# Patient Record
Sex: FEMALE | Race: WHITE | NOT HISPANIC OR LATINO | Employment: OTHER | URBAN - METROPOLITAN AREA
[De-identification: names, ages, dates, MRNs, and addresses within clinical notes are randomized per-mention and may not be internally consistent; named-entity substitution may affect disease eponyms.]

---

## 2017-05-27 ENCOUNTER — COMMUNICATION - HEALTHEAST (OUTPATIENT)
Dept: FAMILY MEDICINE | Facility: CLINIC | Age: 62
End: 2017-05-27

## 2017-05-27 DIAGNOSIS — E78.5 HYPERLIPIDEMIA: ICD-10-CM

## 2017-09-14 ENCOUNTER — COMMUNICATION - HEALTHEAST (OUTPATIENT)
Dept: FAMILY MEDICINE | Facility: CLINIC | Age: 62
End: 2017-09-14

## 2017-09-14 DIAGNOSIS — E78.5 HYPERLIPIDEMIA: ICD-10-CM

## 2017-11-14 ENCOUNTER — OFFICE VISIT - HEALTHEAST (OUTPATIENT)
Dept: FAMILY MEDICINE | Facility: CLINIC | Age: 62
End: 2017-11-14

## 2017-11-14 ENCOUNTER — AMBULATORY - HEALTHEAST (OUTPATIENT)
Dept: SCHEDULING | Facility: CLINIC | Age: 62
End: 2017-11-14

## 2017-11-14 DIAGNOSIS — Z12.31 VISIT FOR SCREENING MAMMOGRAM: ICD-10-CM

## 2017-11-14 DIAGNOSIS — E11.9 TYPE 2 DIABETES MELLITUS WITHOUT COMPLICATION, WITHOUT LONG-TERM CURRENT USE OF INSULIN (H): ICD-10-CM

## 2017-11-14 DIAGNOSIS — Z00.00 HEALTH MAINTENANCE EXAMINATION: ICD-10-CM

## 2017-11-14 DIAGNOSIS — Z78.0 POST-MENOPAUSAL: ICD-10-CM

## 2017-11-14 DIAGNOSIS — R73.03 PREDIABETES: ICD-10-CM

## 2017-11-14 DIAGNOSIS — E78.5 HYPERLIPIDEMIA: ICD-10-CM

## 2017-11-14 LAB
CHOLEST SERPL-MCNC: 250 MG/DL
FASTING STATUS PATIENT QL REPORTED: YES
HBA1C MFR BLD: 6.6 % (ref 3.5–6)
HDLC SERPL-MCNC: 54 MG/DL
LDLC SERPL CALC-MCNC: 163 MG/DL
TRIGL SERPL-MCNC: 165 MG/DL

## 2017-11-14 ASSESSMENT — MIFFLIN-ST. JEOR: SCORE: 1434.82

## 2017-11-15 LAB — HCV AB SERPL QL IA: NEGATIVE

## 2017-11-29 ENCOUNTER — OFFICE VISIT - HEALTHEAST (OUTPATIENT)
Dept: EDUCATION SERVICES | Facility: CLINIC | Age: 62
End: 2017-11-29

## 2017-11-29 DIAGNOSIS — E11.9 DIABETES MELLITUS (H): ICD-10-CM

## 2017-12-05 ENCOUNTER — HOSPITAL ENCOUNTER (OUTPATIENT)
Dept: MAMMOGRAPHY | Facility: HOSPITAL | Age: 62
Discharge: HOME | End: 2017-12-05
Attending: FAMILY MEDICINE

## 2017-12-05 DIAGNOSIS — Z12.31 VISIT FOR SCREENING MAMMOGRAM: ICD-10-CM

## 2017-12-13 ENCOUNTER — COMMUNICATION - HEALTHEAST (OUTPATIENT)
Dept: FAMILY MEDICINE | Facility: CLINIC | Age: 62
End: 2017-12-13

## 2017-12-13 DIAGNOSIS — E78.5 HYPERLIPIDEMIA: ICD-10-CM

## 2018-01-11 ENCOUNTER — OFFICE VISIT - HEALTHEAST (OUTPATIENT)
Dept: FAMILY MEDICINE | Facility: CLINIC | Age: 63
End: 2018-01-11

## 2018-01-11 DIAGNOSIS — M54.50 LEFT-SIDED LOW BACK PAIN WITHOUT SCIATICA: ICD-10-CM

## 2018-01-31 ENCOUNTER — COMMUNICATION - HEALTHEAST (OUTPATIENT)
Dept: FAMILY MEDICINE | Facility: CLINIC | Age: 63
End: 2018-01-31

## 2018-01-31 DIAGNOSIS — M54.50 LOW BACK PAIN: ICD-10-CM

## 2018-01-31 DIAGNOSIS — M54.50 LEFT-SIDED LOW BACK PAIN WITHOUT SCIATICA: ICD-10-CM

## 2018-02-06 ENCOUNTER — OFFICE VISIT - HEALTHEAST (OUTPATIENT)
Dept: PHYSICAL THERAPY | Facility: REHABILITATION | Age: 63
End: 2018-02-06

## 2018-02-06 DIAGNOSIS — M62.81 MUSCLE WEAKNESS (GENERALIZED): ICD-10-CM

## 2018-02-06 DIAGNOSIS — M54.16 LEFT LUMBAR RADICULITIS: ICD-10-CM

## 2018-02-09 ENCOUNTER — OFFICE VISIT - HEALTHEAST (OUTPATIENT)
Dept: PHYSICAL THERAPY | Facility: REHABILITATION | Age: 63
End: 2018-02-09

## 2018-02-09 DIAGNOSIS — M62.81 MUSCLE WEAKNESS (GENERALIZED): ICD-10-CM

## 2018-02-09 DIAGNOSIS — M54.16 LEFT LUMBAR RADICULITIS: ICD-10-CM

## 2018-02-13 ENCOUNTER — OFFICE VISIT - HEALTHEAST (OUTPATIENT)
Dept: PHYSICAL THERAPY | Facility: REHABILITATION | Age: 63
End: 2018-02-13

## 2018-02-13 DIAGNOSIS — M54.16 LEFT LUMBAR RADICULITIS: ICD-10-CM

## 2018-02-13 DIAGNOSIS — M62.81 MUSCLE WEAKNESS (GENERALIZED): ICD-10-CM

## 2018-02-16 ENCOUNTER — OFFICE VISIT - HEALTHEAST (OUTPATIENT)
Dept: PHYSICAL THERAPY | Facility: REHABILITATION | Age: 63
End: 2018-02-16

## 2018-02-16 DIAGNOSIS — M62.81 MUSCLE WEAKNESS (GENERALIZED): ICD-10-CM

## 2018-02-16 DIAGNOSIS — M54.16 LEFT LUMBAR RADICULITIS: ICD-10-CM

## 2018-02-20 ENCOUNTER — OFFICE VISIT - HEALTHEAST (OUTPATIENT)
Dept: FAMILY MEDICINE | Facility: CLINIC | Age: 63
End: 2018-02-20

## 2018-02-20 DIAGNOSIS — E11.9 CONTROLLED TYPE 2 DIABETES MELLITUS WITHOUT COMPLICATION, WITHOUT LONG-TERM CURRENT USE OF INSULIN (H): ICD-10-CM

## 2018-02-20 DIAGNOSIS — R73.09 OTHER ABNORMAL GLUCOSE: ICD-10-CM

## 2018-02-20 DIAGNOSIS — M54.42 CHRONIC LEFT-SIDED LOW BACK PAIN WITH LEFT-SIDED SCIATICA: ICD-10-CM

## 2018-02-20 DIAGNOSIS — E78.5 HYPERLIPIDEMIA: ICD-10-CM

## 2018-02-20 DIAGNOSIS — G89.29 CHRONIC LEFT-SIDED LOW BACK PAIN WITH LEFT-SIDED SCIATICA: ICD-10-CM

## 2018-02-20 LAB
AST SERPL W P-5'-P-CCNC: 32 U/L (ref 0–40)
CHOLEST SERPL-MCNC: 218 MG/DL
CREAT UR-MCNC: 226 MG/DL
FASTING STATUS PATIENT QL REPORTED: YES
HBA1C MFR BLD: 6.8 % (ref 3.5–6)
HDLC SERPL-MCNC: 53 MG/DL
LDLC SERPL CALC-MCNC: 123 MG/DL
MICROALBUMIN UR-MCNC: 8.98 MG/DL (ref 0–1.99)
MICROALBUMIN/CREAT UR: 39.7 MG/G
TRIGL SERPL-MCNC: 210 MG/DL

## 2018-02-20 RX ORDER — ASPIRIN 81 MG/1
81 TABLET, CHEWABLE ORAL DAILY
Status: SHIPPED | COMMUNITY
Start: 2018-02-20 | End: 2023-05-08

## 2018-02-21 ENCOUNTER — OFFICE VISIT - HEALTHEAST (OUTPATIENT)
Dept: PHYSICAL THERAPY | Facility: REHABILITATION | Age: 63
End: 2018-02-21

## 2018-02-21 DIAGNOSIS — M54.16 LEFT LUMBAR RADICULITIS: ICD-10-CM

## 2018-02-21 DIAGNOSIS — M62.81 MUSCLE WEAKNESS (GENERALIZED): ICD-10-CM

## 2018-02-26 ENCOUNTER — COMMUNICATION - HEALTHEAST (OUTPATIENT)
Dept: FAMILY MEDICINE | Facility: CLINIC | Age: 63
End: 2018-02-26

## 2018-02-26 DIAGNOSIS — E78.5 HYPERLIPIDEMIA: ICD-10-CM

## 2018-02-28 ENCOUNTER — OFFICE VISIT - HEALTHEAST (OUTPATIENT)
Dept: PHYSICAL THERAPY | Facility: REHABILITATION | Age: 63
End: 2018-02-28

## 2018-02-28 DIAGNOSIS — M62.81 MUSCLE WEAKNESS (GENERALIZED): ICD-10-CM

## 2018-02-28 DIAGNOSIS — M54.16 LEFT LUMBAR RADICULITIS: ICD-10-CM

## 2018-03-06 ENCOUNTER — AMBULATORY - HEALTHEAST (OUTPATIENT)
Dept: FAMILY MEDICINE | Facility: CLINIC | Age: 63
End: 2018-03-06

## 2018-03-06 ENCOUNTER — AMBULATORY - HEALTHEAST (OUTPATIENT)
Dept: LAB | Facility: CLINIC | Age: 63
End: 2018-03-06

## 2018-03-06 ENCOUNTER — AMBULATORY - HEALTHEAST (OUTPATIENT)
Dept: NURSING | Facility: CLINIC | Age: 63
End: 2018-03-06

## 2018-03-06 DIAGNOSIS — I10 HTN (HYPERTENSION): ICD-10-CM

## 2018-03-06 DIAGNOSIS — I10 ESSENTIAL HYPERTENSION: ICD-10-CM

## 2018-03-06 LAB
ANION GAP SERPL CALCULATED.3IONS-SCNC: 10 MMOL/L (ref 5–18)
BUN SERPL-MCNC: 14 MG/DL (ref 8–22)
CALCIUM SERPL-MCNC: 9.7 MG/DL (ref 8.5–10.5)
CHLORIDE BLD-SCNC: 106 MMOL/L (ref 98–107)
CO2 SERPL-SCNC: 24 MMOL/L (ref 22–31)
CREAT SERPL-MCNC: 0.92 MG/DL (ref 0.6–1.1)
GFR SERPL CREATININE-BSD FRML MDRD: >60 ML/MIN/1.73M2
GLUCOSE BLD-MCNC: 128 MG/DL (ref 70–125)
POTASSIUM BLD-SCNC: 3.9 MMOL/L (ref 3.5–5)
SODIUM SERPL-SCNC: 140 MMOL/L (ref 136–145)

## 2018-03-20 ENCOUNTER — OFFICE VISIT - HEALTHEAST (OUTPATIENT)
Dept: PHYSICAL THERAPY | Facility: REHABILITATION | Age: 63
End: 2018-03-20

## 2018-03-20 DIAGNOSIS — M62.81 MUSCLE WEAKNESS (GENERALIZED): ICD-10-CM

## 2018-03-20 DIAGNOSIS — M54.16 LEFT LUMBAR RADICULITIS: ICD-10-CM

## 2018-03-24 ENCOUNTER — COMMUNICATION - HEALTHEAST (OUTPATIENT)
Dept: FAMILY MEDICINE | Facility: CLINIC | Age: 63
End: 2018-03-24

## 2018-03-24 DIAGNOSIS — I10 HTN (HYPERTENSION): ICD-10-CM

## 2018-05-23 ENCOUNTER — COMMUNICATION - HEALTHEAST (OUTPATIENT)
Dept: FAMILY MEDICINE | Facility: CLINIC | Age: 63
End: 2018-05-23

## 2018-05-23 DIAGNOSIS — E78.5 HYPERLIPIDEMIA: ICD-10-CM

## 2019-02-13 ENCOUNTER — COMMUNICATION - HEALTHEAST (OUTPATIENT)
Dept: FAMILY MEDICINE | Facility: CLINIC | Age: 64
End: 2019-02-13

## 2019-02-13 DIAGNOSIS — E78.5 HYPERLIPIDEMIA: ICD-10-CM

## 2019-02-27 ENCOUNTER — COMMUNICATION - HEALTHEAST (OUTPATIENT)
Dept: FAMILY MEDICINE | Facility: CLINIC | Age: 64
End: 2019-02-27

## 2019-03-01 ENCOUNTER — OFFICE VISIT - HEALTHEAST (OUTPATIENT)
Dept: FAMILY MEDICINE | Facility: CLINIC | Age: 64
End: 2019-03-01

## 2019-03-01 DIAGNOSIS — E78.5 HYPERLIPIDEMIA, UNSPECIFIED HYPERLIPIDEMIA TYPE: ICD-10-CM

## 2019-03-01 DIAGNOSIS — L90.0 LICHEN SCLEROSUS: ICD-10-CM

## 2019-03-01 DIAGNOSIS — E11.9 CONTROLLED TYPE 2 DIABETES MELLITUS WITHOUT COMPLICATION, WITHOUT LONG-TERM CURRENT USE OF INSULIN (H): ICD-10-CM

## 2019-03-01 DIAGNOSIS — E66.9 OBESITY (BMI 30-39.9): ICD-10-CM

## 2019-03-01 LAB
ALBUMIN SERPL-MCNC: 3.6 G/DL (ref 3.5–5)
ALP SERPL-CCNC: 78 U/L (ref 45–120)
ALT SERPL W P-5'-P-CCNC: 36 U/L (ref 0–45)
ANION GAP SERPL CALCULATED.3IONS-SCNC: 9 MMOL/L (ref 5–18)
AST SERPL W P-5'-P-CCNC: 22 U/L (ref 0–40)
BILIRUB SERPL-MCNC: 0.7 MG/DL (ref 0–1)
BUN SERPL-MCNC: 14 MG/DL (ref 8–22)
CALCIUM SERPL-MCNC: 9.3 MG/DL (ref 8.5–10.5)
CHLORIDE BLD-SCNC: 104 MMOL/L (ref 98–107)
CHOLEST SERPL-MCNC: 222 MG/DL
CO2 SERPL-SCNC: 24 MMOL/L (ref 22–31)
CREAT SERPL-MCNC: 0.93 MG/DL (ref 0.6–1.1)
CREAT UR-MCNC: 178.4 MG/DL
FASTING STATUS PATIENT QL REPORTED: YES
GFR SERPL CREATININE-BSD FRML MDRD: >60 ML/MIN/1.73M2
GLUCOSE BLD-MCNC: 171 MG/DL (ref 70–125)
HBA1C MFR BLD: 8.3 % (ref 3.5–6)
HDLC SERPL-MCNC: 58 MG/DL
LDLC SERPL CALC-MCNC: 130 MG/DL
MICROALBUMIN UR-MCNC: 1.94 MG/DL (ref 0–1.99)
MICROALBUMIN/CREAT UR: 10.9 MG/G
POTASSIUM BLD-SCNC: 4.2 MMOL/L (ref 3.5–5)
PROT SERPL-MCNC: 7.1 G/DL (ref 6–8)
SODIUM SERPL-SCNC: 137 MMOL/L (ref 136–145)
TRIGL SERPL-MCNC: 170 MG/DL

## 2019-03-01 ASSESSMENT — MIFFLIN-ST. JEOR: SCORE: 1446.17

## 2019-03-08 ENCOUNTER — COMMUNICATION - HEALTHEAST (OUTPATIENT)
Dept: FAMILY MEDICINE | Facility: CLINIC | Age: 64
End: 2019-03-08

## 2019-03-08 DIAGNOSIS — I10 HTN (HYPERTENSION): ICD-10-CM

## 2019-06-10 ENCOUNTER — OFFICE VISIT - HEALTHEAST (OUTPATIENT)
Dept: FAMILY MEDICINE | Facility: CLINIC | Age: 64
End: 2019-06-10

## 2019-06-10 DIAGNOSIS — Z23 NEED FOR SHINGLES VACCINE: ICD-10-CM

## 2019-06-10 DIAGNOSIS — Z11.59 MEASLES SCREENING: ICD-10-CM

## 2019-06-10 DIAGNOSIS — Z00.00 HEALTH MAINTENANCE EXAMINATION: ICD-10-CM

## 2019-06-10 DIAGNOSIS — E11.9 TYPE 2 DIABETES MELLITUS WITHOUT COMPLICATION, WITHOUT LONG-TERM CURRENT USE OF INSULIN (H): ICD-10-CM

## 2019-06-10 DIAGNOSIS — N90.4 LICHEN SCLEROSUS OF FEMALE GENITALIA: ICD-10-CM

## 2019-06-10 LAB
ALBUMIN SERPL-MCNC: 3.5 G/DL (ref 3.5–5)
ALP SERPL-CCNC: 81 U/L (ref 45–120)
ALT SERPL W P-5'-P-CCNC: 29 U/L (ref 0–45)
ANION GAP SERPL CALCULATED.3IONS-SCNC: 13 MMOL/L (ref 5–18)
AST SERPL W P-5'-P-CCNC: 18 U/L (ref 0–40)
BILIRUB SERPL-MCNC: 0.6 MG/DL (ref 0–1)
BUN SERPL-MCNC: 11 MG/DL (ref 8–22)
CALCIUM SERPL-MCNC: 9.5 MG/DL (ref 8.5–10.5)
CHLORIDE BLD-SCNC: 105 MMOL/L (ref 98–107)
CHOLEST SERPL-MCNC: 196 MG/DL
CO2 SERPL-SCNC: 23 MMOL/L (ref 22–31)
CREAT SERPL-MCNC: 0.82 MG/DL (ref 0.6–1.1)
FASTING STATUS PATIENT QL REPORTED: YES
GFR SERPL CREATININE-BSD FRML MDRD: >60 ML/MIN/1.73M2
GLUCOSE BLD-MCNC: 125 MG/DL (ref 70–125)
HBA1C MFR BLD: 6.8 % (ref 3.5–6)
HDLC SERPL-MCNC: 47 MG/DL
LDLC SERPL CALC-MCNC: 117 MG/DL
POTASSIUM BLD-SCNC: 4.3 MMOL/L (ref 3.5–5)
PROT SERPL-MCNC: 7.2 G/DL (ref 6–8)
SODIUM SERPL-SCNC: 141 MMOL/L (ref 136–145)
TRIGL SERPL-MCNC: 162 MG/DL

## 2019-06-10 ASSESSMENT — MIFFLIN-ST. JEOR: SCORE: 1394.04

## 2019-06-12 LAB — MEV IGG SER IA-ACNC: POSITIVE

## 2019-07-11 ENCOUNTER — COMMUNICATION - HEALTHEAST (OUTPATIENT)
Dept: FAMILY MEDICINE | Facility: CLINIC | Age: 64
End: 2019-07-11

## 2019-07-11 DIAGNOSIS — I10 HTN (HYPERTENSION): ICD-10-CM

## 2019-12-30 ENCOUNTER — COMMUNICATION - HEALTHEAST (OUTPATIENT)
Dept: LAB | Facility: CLINIC | Age: 64
End: 2019-12-30

## 2019-12-30 DIAGNOSIS — E11.9 CONTROLLED TYPE 2 DIABETES MELLITUS WITHOUT COMPLICATION, WITHOUT LONG-TERM CURRENT USE OF INSULIN (H): ICD-10-CM

## 2020-01-10 ENCOUNTER — AMBULATORY - HEALTHEAST (OUTPATIENT)
Dept: LAB | Facility: CLINIC | Age: 65
End: 2020-01-10

## 2020-01-10 DIAGNOSIS — E11.9 CONTROLLED TYPE 2 DIABETES MELLITUS WITHOUT COMPLICATION, WITHOUT LONG-TERM CURRENT USE OF INSULIN (H): ICD-10-CM

## 2020-01-10 LAB — HBA1C MFR BLD: 6 % (ref 3.5–6)

## 2020-02-04 ENCOUNTER — AMBULATORY - HEALTHEAST (OUTPATIENT)
Dept: OTHER | Facility: CLINIC | Age: 65
End: 2020-02-04

## 2020-02-18 ENCOUNTER — COMMUNICATION - HEALTHEAST (OUTPATIENT)
Dept: FAMILY MEDICINE | Facility: CLINIC | Age: 65
End: 2020-02-18

## 2020-02-18 DIAGNOSIS — E78.5 HYPERLIPIDEMIA, UNSPECIFIED HYPERLIPIDEMIA TYPE: ICD-10-CM

## 2020-03-09 ENCOUNTER — COMMUNICATION - HEALTHEAST (OUTPATIENT)
Dept: FAMILY MEDICINE | Facility: CLINIC | Age: 65
End: 2020-03-09

## 2020-03-09 DIAGNOSIS — E11.9 TYPE 2 DIABETES MELLITUS WITHOUT COMPLICATION, WITHOUT LONG-TERM CURRENT USE OF INSULIN (H): ICD-10-CM

## 2020-04-06 ENCOUNTER — COMMUNICATION - HEALTHEAST (OUTPATIENT)
Dept: FAMILY MEDICINE | Facility: CLINIC | Age: 65
End: 2020-04-06

## 2020-04-22 ENCOUNTER — COMMUNICATION - HEALTHEAST (OUTPATIENT)
Dept: FAMILY MEDICINE | Facility: CLINIC | Age: 65
End: 2020-04-22

## 2020-05-06 ENCOUNTER — COMMUNICATION - HEALTHEAST (OUTPATIENT)
Dept: FAMILY MEDICINE | Facility: CLINIC | Age: 65
End: 2020-05-06

## 2020-05-06 DIAGNOSIS — I10 HTN (HYPERTENSION): ICD-10-CM

## 2020-05-13 ENCOUNTER — COMMUNICATION - HEALTHEAST (OUTPATIENT)
Dept: FAMILY MEDICINE | Facility: CLINIC | Age: 65
End: 2020-05-13

## 2020-05-13 DIAGNOSIS — E78.5 HYPERLIPIDEMIA, UNSPECIFIED HYPERLIPIDEMIA TYPE: ICD-10-CM

## 2020-07-29 ENCOUNTER — COMMUNICATION - HEALTHEAST (OUTPATIENT)
Dept: FAMILY MEDICINE | Facility: CLINIC | Age: 65
End: 2020-07-29

## 2020-07-29 DIAGNOSIS — I10 HTN (HYPERTENSION): ICD-10-CM

## 2020-08-05 ENCOUNTER — COMMUNICATION - HEALTHEAST (OUTPATIENT)
Dept: FAMILY MEDICINE | Facility: CLINIC | Age: 65
End: 2020-08-05

## 2020-08-07 ENCOUNTER — COMMUNICATION - HEALTHEAST (OUTPATIENT)
Dept: FAMILY MEDICINE | Facility: CLINIC | Age: 65
End: 2020-08-07

## 2020-08-07 DIAGNOSIS — E78.5 HYPERLIPIDEMIA, UNSPECIFIED HYPERLIPIDEMIA TYPE: ICD-10-CM

## 2020-08-27 ENCOUNTER — OFFICE VISIT - HEALTHEAST (OUTPATIENT)
Dept: FAMILY MEDICINE | Facility: CLINIC | Age: 65
End: 2020-08-27

## 2020-08-27 DIAGNOSIS — E11.9 TYPE 2 DIABETES MELLITUS WITHOUT COMPLICATION, WITHOUT LONG-TERM CURRENT USE OF INSULIN (H): ICD-10-CM

## 2020-08-27 DIAGNOSIS — Z78.0 POST-MENOPAUSAL: ICD-10-CM

## 2020-08-27 DIAGNOSIS — Z12.31 ENCOUNTER FOR SCREENING MAMMOGRAM FOR BREAST CANCER: ICD-10-CM

## 2020-08-27 DIAGNOSIS — E66.9 OBESITY (BMI 30-39.9): ICD-10-CM

## 2020-08-27 DIAGNOSIS — E78.5 HYPERLIPIDEMIA, UNSPECIFIED HYPERLIPIDEMIA TYPE: ICD-10-CM

## 2020-08-27 LAB
ALBUMIN SERPL-MCNC: 3.9 G/DL (ref 3.5–5)
ALP SERPL-CCNC: 90 U/L (ref 45–120)
ALT SERPL W P-5'-P-CCNC: 33 U/L (ref 0–45)
ANION GAP SERPL CALCULATED.3IONS-SCNC: 13 MMOL/L (ref 5–18)
AST SERPL W P-5'-P-CCNC: 20 U/L (ref 0–40)
BILIRUB SERPL-MCNC: 0.8 MG/DL (ref 0–1)
BUN SERPL-MCNC: 16 MG/DL (ref 8–22)
CALCIUM SERPL-MCNC: 9.8 MG/DL (ref 8.5–10.5)
CHLORIDE BLD-SCNC: 103 MMOL/L (ref 98–107)
CHOLEST SERPL-MCNC: 181 MG/DL
CO2 SERPL-SCNC: 21 MMOL/L (ref 22–31)
CREAT SERPL-MCNC: 0.93 MG/DL (ref 0.6–1.1)
CREAT UR-MCNC: 166.3 MG/DL
FASTING STATUS PATIENT QL REPORTED: YES
GFR SERPL CREATININE-BSD FRML MDRD: >60 ML/MIN/1.73M2
GLUCOSE BLD-MCNC: 126 MG/DL (ref 70–125)
HBA1C MFR BLD: 7 %
HDLC SERPL-MCNC: 56 MG/DL
LDLC SERPL CALC-MCNC: 100 MG/DL
MICROALBUMIN UR-MCNC: 1.36 MG/DL (ref 0–1.99)
MICROALBUMIN/CREAT UR: 8.2 MG/G
POTASSIUM BLD-SCNC: 4.4 MMOL/L (ref 3.5–5)
PROT SERPL-MCNC: 7.4 G/DL (ref 6–8)
SODIUM SERPL-SCNC: 137 MMOL/L (ref 136–145)
TRIGL SERPL-MCNC: 124 MG/DL
TSH SERPL DL<=0.005 MIU/L-ACNC: 2.11 UIU/ML (ref 0.3–5)

## 2020-08-27 ASSESSMENT — ANXIETY QUESTIONNAIRES
1. FEELING NERVOUS, ANXIOUS, OR ON EDGE: SEVERAL DAYS
4. TROUBLE RELAXING: NOT AT ALL
IF YOU CHECKED OFF ANY PROBLEMS ON THIS QUESTIONNAIRE, HOW DIFFICULT HAVE THESE PROBLEMS MADE IT FOR YOU TO DO YOUR WORK, TAKE CARE OF THINGS AT HOME, OR GET ALONG WITH OTHER PEOPLE: NOT DIFFICULT AT ALL
3. WORRYING TOO MUCH ABOUT DIFFERENT THINGS: NOT AT ALL
6. BECOMING EASILY ANNOYED OR IRRITABLE: NOT AT ALL
7. FEELING AFRAID AS IF SOMETHING AWFUL MIGHT HAPPEN: SEVERAL DAYS
GAD7 TOTAL SCORE: 3
2. NOT BEING ABLE TO STOP OR CONTROL WORRYING: SEVERAL DAYS
5. BEING SO RESTLESS THAT IT IS HARD TO SIT STILL: NOT AT ALL

## 2020-08-27 ASSESSMENT — PATIENT HEALTH QUESTIONNAIRE - PHQ9: SUM OF ALL RESPONSES TO PHQ QUESTIONS 1-9: 1

## 2020-10-27 ENCOUNTER — COMMUNICATION - HEALTHEAST (OUTPATIENT)
Dept: FAMILY MEDICINE | Facility: CLINIC | Age: 65
End: 2020-10-27

## 2020-10-27 DIAGNOSIS — I10 HTN (HYPERTENSION): ICD-10-CM

## 2020-10-29 RX ORDER — LISINOPRIL 5 MG/1
5 TABLET ORAL DAILY
Qty: 90 TABLET | Refills: 2 | Status: SHIPPED | OUTPATIENT
Start: 2020-10-29 | End: 2021-08-03

## 2020-11-02 ENCOUNTER — COMMUNICATION - HEALTHEAST (OUTPATIENT)
Dept: FAMILY MEDICINE | Facility: CLINIC | Age: 65
End: 2020-11-02

## 2020-11-02 DIAGNOSIS — E78.5 HYPERLIPIDEMIA, UNSPECIFIED HYPERLIPIDEMIA TYPE: ICD-10-CM

## 2021-02-25 ENCOUNTER — COMMUNICATION - HEALTHEAST (OUTPATIENT)
Dept: FAMILY MEDICINE | Facility: CLINIC | Age: 66
End: 2021-02-25

## 2021-02-25 DIAGNOSIS — E11.9 TYPE 2 DIABETES MELLITUS WITHOUT COMPLICATION, WITHOUT LONG-TERM CURRENT USE OF INSULIN (H): ICD-10-CM

## 2021-03-22 ENCOUNTER — OFFICE VISIT - HEALTHEAST (OUTPATIENT)
Dept: FAMILY MEDICINE | Facility: CLINIC | Age: 66
End: 2021-03-22

## 2021-03-22 DIAGNOSIS — N90.4 LICHEN SCLEROSUS OF FEMALE GENITALIA: ICD-10-CM

## 2021-03-22 DIAGNOSIS — Z11.59 SCREENING FOR VIRAL DISEASE: ICD-10-CM

## 2021-03-22 DIAGNOSIS — E11.9 TYPE 2 DIABETES MELLITUS WITHOUT COMPLICATION, WITHOUT LONG-TERM CURRENT USE OF INSULIN (H): ICD-10-CM

## 2021-03-22 DIAGNOSIS — Z00.00 ROUTINE HISTORY AND PHYSICAL EXAMINATION OF ADULT: ICD-10-CM

## 2021-03-22 DIAGNOSIS — E78.5 HYPERLIPIDEMIA, UNSPECIFIED HYPERLIPIDEMIA TYPE: ICD-10-CM

## 2021-03-22 LAB
CHOLEST SERPL-MCNC: 213 MG/DL
FASTING STATUS PATIENT QL REPORTED: YES
HBA1C MFR BLD: 7.5 %
HDLC SERPL-MCNC: 61 MG/DL
HIV 1+2 AB+HIV1 P24 AG SERPL QL IA: NEGATIVE
LDLC SERPL CALC-MCNC: 116 MG/DL
TRIGL SERPL-MCNC: 180 MG/DL

## 2021-03-22 RX ORDER — CLOBETASOL PROPIONATE 0.5 MG/G
OINTMENT TOPICAL
Qty: 60 G | Refills: 1 | Status: SHIPPED | OUTPATIENT
Start: 2021-03-22 | End: 2022-11-03

## 2021-03-22 ASSESSMENT — MIFFLIN-ST. JEOR: SCORE: 1361.23

## 2021-03-24 ENCOUNTER — AMBULATORY - HEALTHEAST (OUTPATIENT)
Dept: NURSING | Facility: CLINIC | Age: 66
End: 2021-03-24

## 2021-03-26 ENCOUNTER — COMMUNICATION - HEALTHEAST (OUTPATIENT)
Dept: FAMILY MEDICINE | Facility: CLINIC | Age: 66
End: 2021-03-26

## 2021-03-26 DIAGNOSIS — E78.5 HYPERLIPIDEMIA, UNSPECIFIED HYPERLIPIDEMIA TYPE: ICD-10-CM

## 2021-03-28 RX ORDER — ATORVASTATIN CALCIUM 20 MG/1
40 TABLET, FILM COATED ORAL AT BEDTIME
Qty: 90 TABLET | Refills: 3 | Status: SHIPPED | OUTPATIENT
Start: 2021-03-28 | End: 2021-08-03

## 2021-04-05 ENCOUNTER — RECORDS - HEALTHEAST (OUTPATIENT)
Dept: ADMINISTRATIVE | Facility: OTHER | Age: 66
End: 2021-04-05

## 2021-04-14 ENCOUNTER — AMBULATORY - HEALTHEAST (OUTPATIENT)
Dept: NURSING | Facility: CLINIC | Age: 66
End: 2021-04-14

## 2021-05-19 ENCOUNTER — COMMUNICATION - HEALTHEAST (OUTPATIENT)
Dept: FAMILY MEDICINE | Facility: CLINIC | Age: 66
End: 2021-05-19

## 2021-05-19 DIAGNOSIS — E78.5 HYPERLIPIDEMIA, UNSPECIFIED HYPERLIPIDEMIA TYPE: ICD-10-CM

## 2021-05-21 ENCOUNTER — COMMUNICATION - HEALTHEAST (OUTPATIENT)
Dept: FAMILY MEDICINE | Facility: CLINIC | Age: 66
End: 2021-05-21

## 2021-05-21 DIAGNOSIS — E78.5 HYPERLIPIDEMIA, UNSPECIFIED HYPERLIPIDEMIA TYPE: ICD-10-CM

## 2021-05-24 ENCOUNTER — RECORDS - HEALTHEAST (OUTPATIENT)
Dept: ADMINISTRATIVE | Facility: CLINIC | Age: 66
End: 2021-05-24

## 2021-05-27 ASSESSMENT — PATIENT HEALTH QUESTIONNAIRE - PHQ9: SUM OF ALL RESPONSES TO PHQ QUESTIONS 1-9: 1

## 2021-05-28 ENCOUNTER — RECORDS - HEALTHEAST (OUTPATIENT)
Dept: ADMINISTRATIVE | Facility: CLINIC | Age: 66
End: 2021-05-28

## 2021-05-28 ASSESSMENT — ANXIETY QUESTIONNAIRES: GAD7 TOTAL SCORE: 3

## 2021-05-29 NOTE — PROGRESS NOTES
FEMALE ADULT PREVENTIVE EXAM    ASSESSMENT:   Hanh was seen today for annual exam.  1. Health maintenance examination  Lipid Cascade    Comprehensive Metabolic Panel   2. Need for shingles vaccine     3. Type 2 diabetes mellitus without complication, without long-term current use of insulin (H)  Glycosylated Hemoglobin A1c   4. Lichen sclerosus of female genitalia     5. Measles screening  Rubeola Antibody, IgG     Advised that she follow-up with GYN if she has increasing flareups of lichen sclerosus.    Recheck diabetes today and then in 3 months time.      PLAN:     continue current medications, continue current healthy lifestyle patterns and return for routine annual checkups      CHIEF COMPLAINT:      Chief Complaint   Patient presents with     Annual Exam     Fasting labs, No concerns     Immunizations     Shingrix # 2         SUBJECTIVE:  Kathy Posada is a 63 y.o. female   presents today for an annual physical examination.   She denies any acute concerns     She recently had a flareup of her lichen sclerosus as she forgot her medication and went for a week long trip to [place].  Usually it improves and summertime as she also swims in the water is soothing.    She has seen GYN for this problem and has been prescribed clobetasol that was not covered, however she has good remission with the mometasone.    No other acute concerns.    She started with metformin and is changed her Lipitor and has had good benefit and has lost weight.      Kathy Posada  has a past medical history  has a past medical history of Lichen sclerosus.        Surgeries:      Past Surgical History:   Procedure Laterality Date     APPENDECTOMY       CHOLECYSTECTOMY           Family History:  family history includes Heart attack (age of onset: 70) in her father; Heart attack (age of onset: 85) in her mother; No Medical Problems in her daughter, maternal aunt, maternal grandfather, maternal grandmother, paternal aunt, paternal grandfather,  "paternal grandmother, and sister.    Social History:   reports that she has never smoked. She has never used smokeless tobacco.    Medications:    Current Outpatient Medications on File Prior to Visit   Medication Sig Dispense Refill     atorvastatin (LIPITOR) 20 MG tablet Take 1 tablet (20 mg total) by mouth at bedtime. 90 tablet 3     lisinopril (PRINIVIL,ZESTRIL) 5 MG tablet Take 1 tablet (5 mg total) by mouth daily. 90 tablet 3     metFORMIN (GLUCOPHAGE) 500 MG tablet Take 1 tablet (500 mg total) by mouth 2 (two) times a day with meals. Increase to 2 tab two times a day in 2 weeks 120 tablet 11     aspirin 81 mg chewable tablet Chew 81 mg daily.       clobetasol (TEMOVATE) 0.05 % ointment APPLY TO AFFECTED SKIN AS INSTRUCTED 60 g 1     mometasone (ELOCON) 0.1 % ointment Apply twice weekly 45 g 3     No current facility-administered medications on file prior to visit.          Allergies:    No Known Allergies      HEALTH MAINTENANCE:  Pap- 2016 NORMAL  Mammography: 11/2017 NORMAL  Colon/Flex Sig: performed in 2013. Next due in 2023.  DEXA: 2017. Done : Due in 3 years, 2021      Healthy Habits:   Regular Exercise: Yes  Sunscreen Use: Yes  Healthy Diet: Yes  Dental Visits Regularly: Yes  Seat Belt: Yes  Sexually active: No  Self Breast Exam Monthly:No  Hemoccults: No  Flex Sig: No  Colonoscopy: Yes  Lipid Profile: Yes  Glucose Screen: Yes  Prevention of Osteoporosis: No  Last Dexa: Yes  Guns at Home:  Yes  Guns Safety Locks:  Yes      REVIEW OF SYSTEMS:  Complete head to toe review of systems is otherwise negative except as above.  Kathy Posada denies any fever, cough, loss of appetite, heart issues, GI issues, new skin lesions, endocrine issues.  She informs me she feels well.      OBJECTIVE:  VITAL SIGNS: /68 (Patient Site: Left Arm, Patient Position: Sitting, Cuff Size: Adult Large)   Pulse 88   Resp 16   Ht 5' 4.57\" (1.64 m)   Wt 187 lb 6.4 oz (85 kg)   SpO2 99%   BMI 31.60 kg/m      GENERAL:  " Patient alert, in no acute distress.  EYES: PERRLA. Extraoccular movements intact, pupils equal, reactive to light and accommodation.  Normal conjunctiva and lids.  ENT:  Hearing grossly normal.  Normal appearance to ears and nose.  Bilateral TM s, external canals, oropharynx normal. Normal lips, gums and teeth.   NECK:  Supple, without thyromegaly or mass.  RESP:  Clear to auscultation without crackles, wheezes or distress.  Normal respiratory effort.   CV:  Regular rate and rhythm without murmurs, rubs or gallops.  Normal carotid, abdominal aorta, femoral and pedal pulses.  No varicosities or edema.  ABDOMEN:  Soft, non-tender, without hepatosplenomegaly, masses, or hernias.   BREASTS:  Nontender, without masses, nipple discharge, erythema, or axillary adenopathy.    PELVIC EXAMINATION:  EXT GEN: No lesions.   PERINEUM: Intact. No perineal or external anal lesions.  URETHRA: No meatal lesions, prolapse. No urethral          tenderness or masses  BLADDER: Nontender, no masses  INTROITUS: Well supported. No lesions or other abnormalities  VAGINA: Noted plaque in ulcerations in the vaginal fourchette  LYMPHATIC: Normal palpation of neck, and axilla..  No lymphadenopathy.   NEURO:   motor & sensory function all intact.  DTR and reflexes normal.  PSYCHIATRIC:  Alert & oriented with normal mood and affect.  Good judgment and insight.  SKIN:  Normal inspection and palpation.  MUSCULOSKELETAL: Normal gait and station.

## 2021-05-30 NOTE — TELEPHONE ENCOUNTER
Medication refill declined: requested too soon- refills on file at pharmacy     Disp Refills Start End    lisinopril (PRINIVIL,ZESTRIL) 5 MG tablet 90 tablet 3 3/10/2019     Sig - Route: Take 1 tablet (5 mg total) by mouth daily. - Oral    Sent to pharmacy as: lisinopril (PRINIVIL,ZESTRIL) 5 MG tablet    E-Prescribing Status: Receipt confirmed by pharmacy (3/10/2019  7:30 PM CDT)      Jenny Dawson RN [facility] Triage/Med Refill 7/11/2019 12:47 PM

## 2021-05-31 VITALS — BODY MASS INDEX: 33.48 KG/M2 | WEIGHT: 196.56 LBS

## 2021-05-31 VITALS — HEIGHT: 64 IN | BODY MASS INDEX: 33.72 KG/M2 | WEIGHT: 197.5 LBS

## 2021-05-31 VITALS — BODY MASS INDEX: 33.21 KG/M2 | WEIGHT: 195 LBS

## 2021-06-02 ENCOUNTER — RECORDS - HEALTHEAST (OUTPATIENT)
Dept: ADMINISTRATIVE | Facility: CLINIC | Age: 66
End: 2021-06-02

## 2021-06-02 VITALS — BODY MASS INDEX: 33.18 KG/M2 | HEIGHT: 65 IN | WEIGHT: 199.13 LBS

## 2021-06-02 VITALS — BODY MASS INDEX: 31.22 KG/M2 | WEIGHT: 187.4 LBS | HEIGHT: 65 IN

## 2021-06-04 VITALS
SYSTOLIC BLOOD PRESSURE: 126 MMHG | HEART RATE: 95 BPM | WEIGHT: 182.8 LBS | RESPIRATION RATE: 16 BRPM | BODY MASS INDEX: 30.83 KG/M2 | OXYGEN SATURATION: 95 % | DIASTOLIC BLOOD PRESSURE: 58 MMHG

## 2021-06-04 NOTE — TELEPHONE ENCOUNTER
"Patient coming in for recheck of her A1C per your f/u request:    \"Recheck diabetes today and then in 3 months time.\"    Please place order, thanks!  "

## 2021-06-05 VITALS
RESPIRATION RATE: 16 BRPM | BODY MASS INDEX: 30.06 KG/M2 | WEIGHT: 180.4 LBS | HEIGHT: 65 IN | DIASTOLIC BLOOD PRESSURE: 75 MMHG | SYSTOLIC BLOOD PRESSURE: 123 MMHG | HEART RATE: 95 BPM

## 2021-06-06 NOTE — TELEPHONE ENCOUNTER
RN cannot approve Refill Request    RN can NOT refill this medication PCP messaged that patient is overdue for Labs and Office Visit. Last office visit: 3/1/2019 Mana Lynch MD Last Physical: 6/10/2019 Last MTM visit: Visit date not found Last visit same specialty: 3/1/2019 Mana Lynch MD.  Next visit within 3 mo: Visit date not found  Next physical within 3 mo: Visit date not found      Eileen Miller, [facility] Triage/Med Refill 3/9/2020    Requested Prescriptions   Pending Prescriptions Disp Refills     metFORMIN (GLUCOPHAGE) 500 MG tablet 120 tablet 11     Sig: Take 1 tablet (500 mg total) by mouth 2 (two) times a day with meals. Increase to 2 tab two times a day in 2 weeks       Metformin Refill Protocol Failed - 3/9/2020  1:42 PM        Failed - Visit with PCP or prescribing provider visit in last 6 months or next 3 months     Last office visit with prescriber/PCP: Visit date not found OR same dept: Visit date not found OR same specialty: 3/1/2019 Mana Lynch MD Last physical: Visit date not found Last MTM visit: Visit date not found         Next appt within 3 mo: Visit date not found  Next physical within 3 mo: Visit date not found  Prescriber OR PCP: Mana Lynch MD  Last diagnosis associated with med order: There are no diagnoses linked to this encounter.   If protocol passes may refill for 12 months if within 3 months of last provider visit (or a total of 15 months).           Failed - Microalbumin in last year      Microalbumin, Random Urine   Date Value Ref Range Status   03/01/2019 1.94 0.00 - 1.99 mg/dL Final                  Passed - Blood pressure in last 12 months     BP Readings from Last 1 Encounters:   06/10/19 121/68             Passed - LFT or AST or ALT in last 12 months     Albumin   Date Value Ref Range Status   06/10/2019 3.5 3.5 - 5.0 g/dL Final     Bilirubin, Total   Date Value Ref Range Status   06/10/2019 0.6 0.0 - 1.0 mg/dL Final     Alkaline  Phosphatase   Date Value Ref Range Status   06/10/2019 81 45 - 120 U/L Final     AST   Date Value Ref Range Status   06/10/2019 18 0 - 40 U/L Final     ALT   Date Value Ref Range Status   06/10/2019 29 0 - 45 U/L Final     Protein, Total   Date Value Ref Range Status   06/10/2019 7.2 6.0 - 8.0 g/dL Final                Passed - GFR or Serum Creatinine in last 6 months     GFR MDRD Non Af Amer   Date Value Ref Range Status   06/10/2019 >60 >60 mL/min/1.73m2 Final     GFR MDRD Af Amer   Date Value Ref Range Status   06/10/2019 >60 >60 mL/min/1.73m2 Final             Passed - A1C in last 6 months     Hemoglobin A1c   Date Value Ref Range Status   01/10/2020 6.0 3.5 - 6.0 % Final

## 2021-06-06 NOTE — TELEPHONE ENCOUNTER
Refill Request  Did you contact pharmacy: Yes  Medication name:   Requested Prescriptions     Pending Prescriptions Disp Refills     metFORMIN (GLUCOPHAGE) 500 MG tablet 120 tablet 11     Sig: Take 1 tablet (500 mg total) by mouth 2 (two) times a day with meals. Increase to 2 tab two times a day in 2 weeks     Who prescribed the medication: Mana Lynch MD   Requested Pharmacy: [facility] #[id number]  Is patient out of medication: Yes  Patient notified refills processed in 3 business days:  yes  Okay to leave a detailed message: yes

## 2021-06-06 NOTE — TELEPHONE ENCOUNTER
Who is calling:  Patient  Reason for Call:    Patient is out of medication.  Patient would like to  today.  Thank you.  Date of last appointment with primary care: 6/10/2019  Okay to leave a detailed message: Yes

## 2021-06-06 NOTE — TELEPHONE ENCOUNTER
Refill Approved    Rx renewed per Medication Renewal Policy. Medication was last renewed on 3/1/19.    Afua Abraham, [facility] Triage/Med Refill 2/20/2020     Requested Prescriptions   Pending Prescriptions Disp Refills     atorvastatin (LIPITOR) 20 MG tablet 90 tablet 3     Sig: Take 1 tablet (20 mg total) by mouth at bedtime.       Statins Refill Protocol (Hmg CoA Reductase Inhibitors) Passed - 2/18/2020  1:24 PM        Passed - PCP or prescribing provider visit in past 12 months      Last office visit with prescriber/PCP: 3/1/2019 Mana Lynch MD OR same dept: 3/1/2019 Mana Lynch MD OR same specialty: 3/1/2019 Mana Lynch MD  Last physical: 6/10/2019 Last MTM visit: Visit date not found   Next visit within 3 mo: Visit date not found  Next physical within 3 mo: Visit date not found  Prescriber OR PCP: Mana Lynch MD  Last diagnosis associated with med order: There are no diagnoses linked to this encounter.  If protocol passes may refill for 12 months if within 3 months of last provider visit (or a total of 15 months).

## 2021-06-07 NOTE — TELEPHONE ENCOUNTER
Per insurance, medication is covered without a PA.  Medication may need to be refilled in order for pharmacy to fill medication.

## 2021-06-07 NOTE — TELEPHONE ENCOUNTER
Prior Authorization Request  Who s requesting:  Pharmacy  Pharmacy Name and Location: [facility] #[id number]  Medication Name: lisinopril (PRINIVIL,ZESTRIL) 5 MG tablet   Insurance Plan: [employer]   Insurance Member ID Number:  [id number]  CoverMyMeds Key: N/A  Informed patient that prior authorizations can take up to 10 business days for response:   NA  Okay to leave a detailed message: No

## 2021-06-08 NOTE — TELEPHONE ENCOUNTER
Refill Approved    Rx renewed per Medication Renewal Policy. Medication was last renewed on 3/10/19.    Afua Abraham, [facility] Triage/Med Refill 5/8/2020     Requested Prescriptions   Pending Prescriptions Disp Refills     lisinopriL (PRINIVIL,ZESTRIL) 5 MG tablet 90 tablet 3     Sig: Take 1 tablet (5 mg total) by mouth daily.       Ace Inhibitors Refill Protocol Passed - 5/6/2020  2:00 PM        Passed - PCP or prescribing provider visit in past 12 months       Last office visit with prescriber/PCP: 3/1/2019 Mana Lynch MD OR same dept: Visit date not found OR same specialty: 3/1/2019 Mana Lynch MD  Last physical: 6/10/2019 Last MTM visit: Visit date not found   Next visit within 3 mo: Visit date not found  Next physical within 3 mo: Visit date not found  Prescriber OR PCP: Mana Lnych MD  Last diagnosis associated with med order: 1. HTN (hypertension)  - lisinopriL (PRINIVIL,ZESTRIL) 5 MG tablet; Take 1 tablet (5 mg total) by mouth daily.  Dispense: 90 tablet; Refill: 3    If protocol passes may refill for 12 months if within 3 months of last provider visit (or a total of 15 months).             Passed - Serum Potassium in past 12 months     Lab Results   Component Value Date    Potassium 4.3 06/10/2019             Passed - Blood pressure filed in past 12 months     BP Readings from Last 1 Encounters:   06/10/19 121/68             Passed - Serum Creatinine in past 12 months     Creatinine   Date Value Ref Range Status   06/10/2019 0.82 0.60 - 1.10 mg/dL Final

## 2021-06-08 NOTE — TELEPHONE ENCOUNTER
Refill Approved    Rx renewed per Medication Renewal Policy. Medication was last renewed on 2/20/20.    Afua Abraham, [facility] Triage/Med Refill 5/14/2020     Requested Prescriptions   Pending Prescriptions Disp Refills     atorvastatin (LIPITOR) 20 MG tablet 90 tablet 0     Sig: Take 1 tablet (20 mg total) by mouth at bedtime.       Statins Refill Protocol (Hmg CoA Reductase Inhibitors) Passed - 5/13/2020  8:10 AM        Passed - PCP or prescribing provider visit in past 12 months      Last office visit with prescriber/PCP: 3/1/2019 Mana Lynch MD OR same dept: Visit date not found OR same specialty: 3/1/2019 Mana Lynch MD  Last physical: 6/10/2019 Last MTM visit: Visit date not found   Next visit within 3 mo: Visit date not found  Next physical within 3 mo: Visit date not found  Prescriber OR PCP: Mana Lynch MD  Last diagnosis associated with med order: 1. Hyperlipidemia, unspecified hyperlipidemia type  - atorvastatin (LIPITOR) 20 MG tablet; Take 1 tablet (20 mg total) by mouth at bedtime.  Dispense: 90 tablet; Refill: 0    If protocol passes may refill for 12 months if within 3 months of last provider visit (or a total of 15 months).

## 2021-06-10 NOTE — PROGRESS NOTES
Assessment:     1. Type 2 diabetes mellitus without complication, without long-term current use of insulin (H)  Glycosylated Hemoglobin A1c    Lipid Cascade FASTING    Microalbumin, Random Urine    Comprehensive Metabolic Panel   2. Encounter for screening mammogram for breast cancer  Mammo Screening Bilateral   3. Obesity (BMI 30-39.9)  Thyroid Cascade   4. Hyperlipidemia, unspecified hyperlipidemia type  Lipid Cascade FASTING   5. Post-menopausal  DXA Bone Density Scan     Lab testing as above.  Continue current medication.  Advise physical activity including exercise and work towards weight loss.  For health maintenance, DEXA bone density scan is being ordered.  Last one done 10/20/2017 was normal.    Plan:      The diagnosis was discussed with the patient and evaluation and treatment plans outlined.  See orders for lab and imaging studies.  Further follow-up plans will be based on outcome of lab/imaging studies; see orders.  Follow up: Return in about 6 months (around 2/27/2021) for Annual physical.     Subjective:      Kathy Posada is a  female who presents for evaluation of   Chief Complaint   Patient presents with     Medication Management     Medication check     Patient is here today for management of her diabetes and hyperlipidemia.  In general she is feeling well.  However, in December 2019 her  had a cardiac arrest.  This has overwhelmed her as she is the main caretaker.  He was on ECMO and had stenting done.  His risk factors include heavy alcohol use, hyperlipidemia and hypertension.    Patient continues to take metformin for diabetes and atorvastatin for her cholesterol.  She does continue to swim for exercise.  She denies any fatigue, or weight gain.  Her appetite is normal.  She does get occasional flareups of lichen sclerosis for which she uses topical ointment.  She denies any respiratory or cardiac symptoms.    The following portions of the patient's history were reviewed and updated  as appropriate: allergies, current medications, past family history, past medical history, past social history, past surgical history and problem list.    Review of Systems  A 12 point comprehensive review of systems was negative except as noted.       Objective:   /58 (Patient Site: Left Arm, Patient Position: Sitting, Cuff Size: Adult Large)   Pulse 95   Resp 16   Wt 182 lb 12.8 oz (82.9 kg)   SpO2 95%   BMI 30.83 kg/m      GENERAL: Healthy, alert and no distress  EYES: Eyes grossly normal to inspection. No discharge or erythema, or obvious scleral/conjunctival abnormalities.  RESP: No audible wheeze, cough, or visible cyanosis.  No visible retractions or increased work of breathing.    NEURO: Cranial nerves grossly intact. Mentation and speech appropriate for age.  PSYCH: Mentation appears normal, affect normal/bright, judgement and insight intact, normal speech and appearance well-groomed  Monofilament exam was done and was normal/negative.

## 2021-06-10 NOTE — TELEPHONE ENCOUNTER
RN cannot approve Refill Request    RN can NOT refill this medication Protocol failed and NO refill given. Last office visit: 3/1/2019 Mana Lynch MD Last Physical: 6/10/2019 Last MTM visit: Visit date not found Last visit same specialty: 3/1/2019 Mana Lynch MD.  Next visit within 3 mo: Visit date not found  Next physical within 3 mo: Visit date not found      Isabela Zee, [facility] Triage/Med Refill 8/7/2020    Requested Prescriptions   Pending Prescriptions Disp Refills     atorvastatin (LIPITOR) 20 MG tablet 90 tablet 0     Sig: Take 1 tablet (20 mg total) by mouth at bedtime.       Statins Refill Protocol (Hmg CoA Reductase Inhibitors) Failed - 8/7/2020  5:13 PM        Failed - PCP or prescribing provider visit in past 12 months      Last office visit with prescriber/PCP: 3/1/2019 Mana Lynch MD OR same dept: Visit date not found OR same specialty: 3/1/2019 Mana Lynch MD  Last physical: 6/10/2019 Last MTM visit: Visit date not found   Next visit within 3 mo: Visit date not found  Next physical within 3 mo: Visit date not found  Prescriber OR PCP: Mana Lynch MD  Last diagnosis associated with med order: 1. Hyperlipidemia, unspecified hyperlipidemia type  - atorvastatin (LIPITOR) 20 MG tablet; Take 1 tablet (20 mg total) by mouth at bedtime.  Dispense: 90 tablet; Refill: 0    If protocol passes may refill for 12 months if within 3 months of last provider visit (or a total of 15 months).

## 2021-06-10 NOTE — TELEPHONE ENCOUNTER
Left message to call back for: Appointment  Information to relay to patient:  Please help pt schedule physical or med check upon return call

## 2021-06-10 NOTE — TELEPHONE ENCOUNTER
RN cannot approve Refill Request    RN can NOT refill this medication Protocol failed and NO refill given. Last office visit: 3/1/2019 Mana Lynch MD Last Physical: 6/10/2019 Last MTM visit: Visit date not found Last visit same specialty: 3/1/2019 Mana Lynch MD.  Next visit within 3 mo: Visit date not found  Next physical within 3 mo: Visit date not found      Afua Abraham, [facility] Triage/Med Refill 7/30/2020    Requested Prescriptions   Pending Prescriptions Disp Refills     lisinopriL (PRINIVIL,ZESTRIL) 5 MG tablet 90 tablet 3     Sig: Take 1 tablet (5 mg total) by mouth daily.       Ace Inhibitors Refill Protocol Failed - 7/29/2020 10:19 AM        Failed - PCP or prescribing provider visit in past 12 months       Last office visit with prescriber/PCP: 3/1/2019 Mana Lynch MD OR same dept: Visit date not found OR same specialty: 3/1/2019 Mana Lynch MD  Last physical: 6/10/2019 Last MTM visit: Visit date not found   Next visit within 3 mo: Visit date not found  Next physical within 3 mo: Visit date not found  Prescriber OR PCP: Mana Lynch MD  Last diagnosis associated with med order: 1. HTN (hypertension)  - lisinopriL (PRINIVIL,ZESTRIL) 5 MG tablet; Take 1 tablet (5 mg total) by mouth daily.  Dispense: 90 tablet; Refill: 0    If protocol passes may refill for 12 months if within 3 months of last provider visit (or a total of 15 months).             Failed - Serum Potassium in past 12 months     No results found for: LN-POTASSIUM          Failed - Blood pressure filed in past 12 months     BP Readings from Last 1 Encounters:   06/10/19 121/68             Failed - Serum Creatinine in past 12 months     Creatinine   Date Value Ref Range Status   06/10/2019 0.82 0.60 - 1.10 mg/dL Final

## 2021-06-12 NOTE — TELEPHONE ENCOUNTER
Refill Approved    Rx renewed per Medication Renewal Policy. Medication was last renewed on 7/31/20.    Afua Abraham, [facility] Triage/Med Refill 10/29/2020     Requested Prescriptions   Pending Prescriptions Disp Refills     lisinopriL (PRINIVIL,ZESTRIL) 5 MG tablet 90 tablet 0     Sig: Take 1 tablet (5 mg total) by mouth daily.       Ace Inhibitors Refill Protocol Passed - 10/28/2020  1:45 PM        Passed - PCP or prescribing provider visit in past 12 months       Last office visit with prescriber/PCP: 8/27/2020 Mana Lynch MD OR same dept: 8/27/2020 Mana Lynch MD OR same specialty: 8/27/2020 Mana Lynch MD  Last physical: 6/10/2019 Last MTM visit: Visit date not found   Next visit within 3 mo: Visit date not found  Next physical within 3 mo: Visit date not found  Prescriber OR PCP: Mana Lynch MD  Last diagnosis associated with med order: 1. HTN (hypertension)  - lisinopriL (PRINIVIL,ZESTRIL) 5 MG tablet; Take 1 tablet (5 mg total) by mouth daily.  Dispense: 90 tablet; Refill: 0    If protocol passes may refill for 12 months if within 3 months of last provider visit (or a total of 15 months).             Passed - Serum Potassium in past 12 months     Lab Results   Component Value Date    Potassium 4.4 08/27/2020             Passed - Blood pressure filed in past 12 months     BP Readings from Last 1 Encounters:   08/27/20 126/58             Passed - Serum Creatinine in past 12 months     Creatinine   Date Value Ref Range Status   08/27/2020 0.93 0.60 - 1.10 mg/dL Final

## 2021-06-12 NOTE — TELEPHONE ENCOUNTER
Requested Prescriptions     Pending Prescriptions Disp Refills     atorvastatin (LIPITOR) 20 MG tablet 90 tablet 0     Sig: Take 1 tablet (20 mg total) by mouth at bedtime.   Last Office Visit:  8/27/2020  Last Refill:  8/9/2020  CSA:  No  Date of Last Labs:  8/27/2020

## 2021-06-12 NOTE — TELEPHONE ENCOUNTER
Refill Approved    Rx renewed per Medication Renewal Policy. Medication was last renewed on 8/9/20.    Afua Abraham, [facility] Triage/Med Refill 11/5/2020     Requested Prescriptions   Pending Prescriptions Disp Refills     atorvastatin (LIPITOR) 20 MG tablet 90 tablet 0     Sig: Take 1 tablet (20 mg total) by mouth at bedtime.       Statins Refill Protocol (Hmg CoA Reductase Inhibitors) Passed - 11/2/2020  9:43 AM        Passed - PCP or prescribing provider visit in past 12 months      Last office visit with prescriber/PCP: 8/27/2020 Mana Lynch MD OR same dept: 8/27/2020 Mana Lynch MD OR same specialty: 8/27/2020 Mana Lynch MD  Last physical: 6/10/2019 Last MTM visit: Visit date not found   Next visit within 3 mo: Visit date not found  Next physical within 3 mo: Visit date not found  Prescriber OR PCP: Mana Lynch MD  Last diagnosis associated with med order: 1. Hyperlipidemia, unspecified hyperlipidemia type  - atorvastatin (LIPITOR) 20 MG tablet; Take 1 tablet (20 mg total) by mouth at bedtime.  Dispense: 90 tablet; Refill: 0    If protocol passes may refill for 12 months if within 3 months of last provider visit (or a total of 15 months).

## 2021-06-14 NOTE — PROGRESS NOTES
ASSESSMENT & PLAN:    1. Visit for screening mammogram  Patient advised she is overdue for mammogram and recommend she schedule this at her earliest convenience  - Mammo Screening Bilateral; Future    2. Hyperlipidemia  Currently taking 10 mg simvastatin daily (half pill).  She hopes to start focusing more on her diet and exercise and weight loss, but realistically this might be a few months off, she is very busy until she retires in January.  - Comprehensive Metabolic Panel  - Lipid Cascade    3. Prediabetes  - Glycosylated Hemoglobin A1c    4. Health maintenance examination  Fasting lipids and glucose.  Hep C screening based on age.  Immunization, Pap, colonoscopy up-to-date.   - Hepatitis C Antibody (Anti-HCV)    5. Post-menopausal  - DXA Bone Density Scan; Future    Discussed [place] [facility], she may be interested in this starting in January when she retires.  She can let me know if she wants referral.    Patient Instructions   Call Dr. Clark in January if you want a referral to [facility].      Orders Placed This Encounter   Procedures     Mammo Screening Bilateral     Standing Status:   Future     Standing Expiration Date:   2/14/2019     Order Specific Question:   Patient's previous breast density:     Answer:   Scattered fibroglandular density [2]     Order Specific Question:   Can the procedure be changed per Radiologist protocol?     Answer:   Yes     DXA Bone Density Scan     Standing Status:   Future     Standing Expiration Date:   11/15/2018     Scheduling Instructions:      PATIENT WILL SCHEDULE?  Yes            [facility]       [phone]- [place]       [phone]- [facility]       [phone]- [place]      [phone]     Order Specific Question:   Reason for Exam (Describe Symptoms):     Answer:   post menopausal     Order Specific Question:   Can the procedure be changed per Radiologist protocol?     Answer:   Yes      Comprehensive Metabolic Panel     Lipid Cascade     Order Specific Question:   Fasting is required?     Answer:   Yes     Glycosylated Hemoglobin A1c     Hepatitis C Antibody (Anti-HCV)     Medications Discontinued During This Encounter   Medication Reason     clobetasol (TEMOVATE) 0.05 % ointment Reorder       No Follow-up on file.    CHIEF COMPLAINT:  Chief Complaint   Patient presents with     Annual Exam     fasting labs today; no questions or concerns; no med refills needed today; last pap 6/13/2016 normal       HISTORY OF PRESENT ILLNESS:  Kathy is a 62 y.o. female presenting to the clinic today for a physical examination.    Health Maintenance: She explains that she is feeling well. No history of an abnormal pap smear; she plans to return at age 66 for pap. She agrees to get a mammogram today; her last was in June 2015. She also agrees to get a bone density scan at the [facility]. Her last bone density was in 06/2015 and it showed a low t-score of -0.8 in her left femur. She is up to date on her colonoscopy screenings and immunizations. Her last colonoscopy was in 08/2013 and it was normal; she is on a 10 year screening plan. She agrees to be screened for hepatitis C.    Lichen Sclerosis: She is currently using the clobetasol cream intermittently for vaginal itching, and is no longer on vaginal estrogen. She states that the itching flares up occasionally. She only uses the ointment when she has a flare-up; these usually last a couple of weeks and then go away. This was prescribed through OB/GYN, which she has not followed up with. She tries to routinely look at her vaginal skin to monitor the sclerosis, has not noted any thickening or other changes.     Obesity: She states that she is struggling to eat well and exercise regularly. She explains that this is due to stress from work.    Hyperlipidemia: She is currently taking 10 mg simvastatin once per day and does not mention any adverse side  effects.    REVIEW OF SYSTEMS:   She explains that she does not have issues with her eyes. She denies any chest pain, pressure, or shortness of breath with exertion. All other systems are negative.    PFSH:  Social: She is planning on retiring in January from her current job. She is .    Social History     Social History     Marital status:      Spouse name: N/A     Number of children: N/A     Years of education: N/A     Occupational History     Not on file.     Social History Main Topics     Smoking status: Never Smoker     Smokeless tobacco: Not on file     Alcohol use Not on file     Drug use: Not on file     Sexual activity: Not on file     Other Topics Concern     Not on file     Social History Narrative       History reviewed. No pertinent past medical history.    Past Surgical History:   Procedure Laterality Date     APPENDECTOMY       CHOLECYSTECTOMY         No Known Allergies    Active Ambulatory Problems     Diagnosis Date Noted     Prediabetes      Exogenous Obesity      Hyperlipidemia      Joint Pain, Localized In The Shoulder      Upper Back Pain (Between Shoulder Blades)      Resolved Ambulatory Problems     Diagnosis Date Noted     No Resolved Ambulatory Problems     No Additional Past Medical History       Family History   Problem Relation Age of Onset     Heart attack Mother 85      at age 86     Heart attack Father 70     Heart attack in 70s,  at age 91     No Medical Problems Sister      No Medical Problems Daughter      No Medical Problems Maternal Grandmother      No Medical Problems Maternal Grandfather      No Medical Problems Paternal Grandmother      No Medical Problems Paternal Grandfather      No Medical Problems Maternal Aunt      No Medical Problems Paternal Aunt      BRCA 1/2 Neg Hx      Breast cancer Neg Hx      Cancer Neg Hx      Colon cancer Neg Hx      Endometrial cancer Neg Hx      Ovarian cancer Neg Hx        VITALS:  Vitals:    17 0847   BP: 114/84  "  Patient Site: Left Arm   Patient Position: Sitting   Cuff Size: Adult Large   Pulse: 84   Resp: 16   Temp: 97.5  F (36.4  C)   TempSrc: Oral   Weight: 197 lb 8 oz (89.6 kg)   Height: 5' 4.25\" (1.632 m)     Wt Readings from Last 3 Encounters:   11/14/17 197 lb 8 oz (89.6 kg)   06/13/16 193 lb (87.5 kg)   09/25/15 186 lb 4 oz (84.5 kg)       QUALITY MEASURES:  The following high BMI interventions were performed this visit: encouragement to exercise    PHYSICAL EXAM:  GENERAL:  Pleasant, well-appearing woman in no acute distress.  VITAL SIGNS:  Reviewed.  HEENT:  Pupils are equal, round, and reactive to light.  Sclerae and  conjunctivae clear.  TMs are clear bilaterally.  Oropharynx is clear with  moist mucous membranes.  NECK:  Supple without lymphadenopathy or thyromegaly.  No carotid bruits.  CARDIOVASCULAR:  Heart regular rate and rhythm without murmur.  Normal S1 and  S2.  LUNGS:  Clear to auscultation bilaterally without wheezes or crackles.  Good  air movement throughout.  BREASTS:  Normal contours.  No skin dimpling, nipple retraction or nipple  discharge.  Palpation reveals no masses, no supraclavicular or axillary  lymphadenopathy.    ABDOMEN:  Soft, non tender, and non distended without  guarding or rebound.  No organomegaly.  PELVIS:  Normal female external genitalia.  No skin lesions.     Bimanual exam reveals no  cervical motion tenderness, no uterine or adnexal masses or tenderness.  EXTREMITIES:  Warm and well perfused without edema. Dorsalis pedis pulses easily palpable and symmetric bilaterally.  NEURO: Alert and oriented. Grossly non focal.  PSYCHIATRIC: Presents on time and well groomed.  Normal speech and thought content.  Full affect.  No abnormal movements or behaviors noted.     DATA REVIEWED:  ADDITIONAL HISTORY SUMMARIZED (2): Reviewed OB history from 8/5/15 for lichen sclerosus.   DECISION TO OBTAIN EXTRA INFORMATION (1): None.   RADIOLOGY TESTS SUMMARIZED OR ORDERED (1): Mammogram and DXA " ordered today.  LABS REVIEWED OR ORDERED (1): Labs ordered today.  MEDICINE TESTS SUMMARIZED OR ORDERED (1): None.  INDEPENDENT REVIEW OF EKG OR X-RAY (2 EACH): None.     The visit lasted a total of 15 minutes face to face with the patient. Over 50% of the time was spent counseling and educating the patient about general health maintenance.    IAmita, am scribing for and in the presence of, Dr. Clark.    I, Dr. Clark, personally performed the services described in this documentation, as scribed by Amita Tellez in my presence, and it is both accurate and complete.    MEDICATIONS:  Current Outpatient Prescriptions   Medication Sig Dispense Refill     clobetasol (TEMOVATE) 0.05 % ointment APPLY TO AFFECTED SKIN AS INSTRUCTED 30 g 1     simvastatin (ZOCOR) 20 MG tablet TAKE ONE TABLET BY MOUTH ONE TIME DAILY 90 tablet 0     No current facility-administered medications for this visit.          Total Data Points: 4

## 2021-06-14 NOTE — PROGRESS NOTES
Assessment: pt seen for initial visit, newly diagnosed. Mother had DM in her 80's. 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 She has been under stress. Not eating healthy and not active.   As of January 1st, she will be officially retired and will feel like she has more time to focus on her health.   We reviewed DM meal plan. CHO foods vs non CHO foods, portions and goals. She does eat 3 meals/day right now.   Discussed the importance of activity and strongly encouraged her to start and work her way to 150 mins/week. She does have a treadmill at home.   She was able to check BG w/o difficulty. BG was 121 mg/dl 2 hours after breakfast.     Plan: check BG 1-2x/day rotating before and 2 hours after meals. Increase activity as discussed. Start to look at CHO foods and portions closer. Goal 3-4/meal, 1-2/snack.     Subjective and Objective:      Kathy Posada is referred by Jennifer Clark for Diabetes Education.     Lab Results   Component Value Date    HGBA1C 6.6 (H) 11/14/2017       Current diabetes medications:  None       Follow up:   After 2/14/18 with PCP for A1c.   CDE PRN per pt       Education:     Monitoring   Meter (per above goals): Assessed, Discussed, Literature provided and Patient returned demonstration  Monitoring: Discussed and Literature provided  BG goals: Discussed and Literature provided    Nutrition Management  Nutrition Management: Assessed, Discussed and Literature provided  Weight: Discussed and Literature provided  Portions/Balance: Assessed, Discussed and Literature provided  Carb ID/Count: Assessed, Discussed and Literature provided  Label Reading: Assessed, Discussed and Literature provided  Heart Healthy Fats: Assessed, Discussed and Literature provided  Menu Planning: Assessed, Discussed and Literature provided  Dining Out: Assessed, Discussed and Literature provided  Physical Activity: Assessed, Discussed and Literature provided  Medications:  Discussed    Diabetes Disease Process: Discussed and Literature provided    Acute Complications: Prevent, Detect, Treat:  Hypoglycemia: Assessed, Discussed and Literature provided  Hyperglycemia: Assessed, Discussed and Literature provided  Sick Days: Discussed and Literature provided  Driving: Needs instruction/review at follow-up    Chronic Complications  Foot Care:Discussed and Literature provided  Skin Care: Discussed and Literature provided  Eye: Discussed and Literature provided  ABC: Assessed, Discussed and Literature provided  Teeth:Discussed and Literature provided  Goal Setting and Problem Solving: Assessed, Discussed and Literature provided  Barriers: Assessed and Discussed  Psychosocial Adjustments: Assessed, Discussed and Literature provided      Time spent with the patient: 60 minutes for diabetes education and counseling.   Previous Education: no  Visit Type:BRADY Lopez  11/29/2017

## 2021-06-15 NOTE — TELEPHONE ENCOUNTER
RN cannot approve Refill Request    RN can NOT refill this medication PCP messaged that patient is overdue for Labs and Office Visit. Last office visit: 8/27/2020 Mana Lynch MD Last Physical: 6/10/2019 Last MTM visit: Visit date not found Last visit same specialty: 8/27/2020 Mana Lynch MD.  Next visit within 3 mo: Visit date not found  Next physical within 3 mo: Visit date not found      Pallavi Watson, [facility] Triage/Med Refill 2/25/2021    Requested Prescriptions   Pending Prescriptions Disp Refills     metFORMIN (GLUCOPHAGE) 500 MG tablet 120 tablet 11     Sig: Take 1 tablet (500 mg total) by mouth 2 (two) times a day with meals. Increase to 2 tab two times a day in 2 weeks       Metformin Refill Protocol Failed - 2/25/2021  2:30 PM        Failed - Visit with PCP or prescribing provider visit in last 6 months or next 3 months     Last office visit with prescriber/PCP: Visit date not found OR same dept: 8/27/2020 Mana Lynch MD OR same specialty: 8/27/2020 Mana Lynch MD Last physical: Visit date not found Last MTM visit: Visit date not found         Next appt within 3 mo: Visit date not found  Next physical within 3 mo: Visit date not found  Prescriber OR PCP: Mana Lynch MD  Last diagnosis associated with med order: 1. Type 2 diabetes mellitus without complication, without long-term current use of insulin (H)  - metFORMIN (GLUCOPHAGE) 500 MG tablet; Take 1 tablet (500 mg total) by mouth 2 (two) times a day with meals. Increase to 2 tab two times a day in 2 weeks  Dispense: 120 tablet; Refill: 11     If protocol passes may refill for 12 months if within 3 months of last provider visit (or a total of 15 months).           Failed - A1C in last 6 months     Hemoglobin A1c   Date Value Ref Range Status   08/27/2020 7.0 (H) <=5.6 % Final     Comment:     Normal <5.7% Prediabete 5.7-6.4% Diabletes 6.5% or higher - adopted from ADA consensus guidelines                Passed - Blood pressure in last 12 months     BP Readings from Last 1 Encounters:   08/27/20 126/58             Passed - LFT or AST or ALT in last 12 months     Albumin   Date Value Ref Range Status   08/27/2020 3.9 3.5 - 5.0 g/dL Final     Bilirubin, Total   Date Value Ref Range Status   08/27/2020 0.8 0.0 - 1.0 mg/dL Final     Alkaline Phosphatase   Date Value Ref Range Status   08/27/2020 90 45 - 120 U/L Final     AST   Date Value Ref Range Status   08/27/2020 20 0 - 40 U/L Final     ALT   Date Value Ref Range Status   08/27/2020 33 0 - 45 U/L Final     Protein, Total   Date Value Ref Range Status   08/27/2020 7.4 6.0 - 8.0 g/dL Final                Passed - GFR or Serum Creatinine in last 6 months     GFR MDRD Non Af Amer   Date Value Ref Range Status   08/27/2020 >60 >60 mL/min/1.73m2 Final     GFR MDRD Af Amer   Date Value Ref Range Status   08/27/2020 >60 >60 mL/min/1.73m2 Final             Passed - Microalbumin in last year      Microalbumin, Random Urine   Date Value Ref Range Status   08/27/2020 1.36 0.00 - 1.99 mg/dL Final

## 2021-06-15 NOTE — PROGRESS NOTES
ASSESSMENT:  1. Left-sided low back pain without sciatica  cyclobenzaprine (FLEXERIL) 5 MG tablet     PLAN:  Continue rice principles and will give patient a low-dose of Flexeril to use at least nightly for a couple of weeks to see if we can improve upon symptoms.  If not improving as expected consider physical therapy versus spine evaluation.  No problem-specific Assessment & Plan notes found for this encounter.      There are no Patient Instructions on file for this visit.    No orders of the defined types were placed in this encounter.    There are no discontinued medications.    No Follow-up on file.    CHIEF COMPLAINT:  Chief Complaint   Patient presents with     Back Pain     c/o severe back pain after walking around [place].        HISTORY OF PRESENT ILLNESS:  Kathy is a 62 y.o. female here today presenting with low back pain after walking around [employer] a few days ago.  At the time of activity patient did not have a lot of pain but felt quite stiff before going to bed and woke up with severe left-sided low back pain.  Patient will get spasms depending on movement, which are quite debilitating.  She has been using here and there dosing of ibuprofen, has tried ice and heat, none of this is been very helpful for her.  No previous back injuries or disc issues.  No radiation of pain into the legs.  No effect on bowel and bladder function.    REVIEW OF SYSTEMS:      Pertinent items are noted in HPI.  All other systems are negative  PFSH:  Reviewed, no changes      TOBACCO USE:  History   Smoking Status     Never Smoker   Smokeless Tobacco     Not on file       VITALS:  Vitals:    01/11/18 1115   BP: 142/80   Patient Site: Left Arm   Patient Position: Sitting   Cuff Size: Adult Large   Pulse: 78   Resp: 16   Temp: 97.9  F (36.6  C)   TempSrc: Oral   Weight: 196 lb 9 oz (89.2 kg)     Wt Readings from Last 3 Encounters:   01/11/18 196 lb 9 oz (89.2 kg)   11/14/17 197 lb 8 oz (89.6 kg)   06/13/16 193 lb (87.5 kg)        PHYSICAL EXAM:   /80 (Patient Site: Left Arm, Patient Position: Sitting, Cuff Size: Adult Large)  Pulse 78  Temp 97.9  F (36.6  C) (Oral)   Resp 16  Wt 196 lb 9 oz (89.2 kg)  BMI 33.48 kg/m2  General appearance: alert, appears stated age and cooperative  Back: symmetric, no curvature. ROM normal. No CVA tenderness.  Extremities: extremities normal, atraumatic, no cyanosis or edema no bony tenderness over the spine, left-sided low  to palpation.    Neurologic: Grossly normal  Psychologic: Mood and affect normal.      DATA REVIEWED:  Additional History from Old Records Summarized (2): 0  Decision to Obtain Records (1): 0  Radiology Tests Summarized or Ordered (1): 0  Labs Reviewed or Ordered (1): 0  Medicine Test Summarized or Ordered (1): 0  Independent Review of EKG or X-RAY(2 each): 0    The visit lasted a total of 20 minutes face to face with the patient. Over 50% of the time was spent counseling and educating the patient about plan of care.    MEDICATIONS:  Current Outpatient Prescriptions   Medication Sig Dispense Refill     blood glucose test (CONTOUR NEXT STRIPS) strips Use 1 each As Directed 2 (two) times a day. 200 strip 4     clobetasol (TEMOVATE) 0.05 % ointment APPLY TO AFFECTED SKIN AS INSTRUCTED 30 g 1     generic lancets Use 1 each As Directed 2 (two) times a day. 200 each 4     simvastatin (ZOCOR) 20 MG tablet TAKE ONE TABLET BY MOUTH ONE TIME DAILY 90 tablet 2     cyclobenzaprine (FLEXERIL) 5 MG tablet Take 1 tablet (5 mg total) by mouth 3 (three) times a day as needed for muscle spasms. 10 tablet 0     No current facility-administered medications for this visit.        This note has been dictated using voice recognition software. Any grammatical or context distortions are unintentional and inherent to the software

## 2021-06-15 NOTE — PROGRESS NOTES
[facility] Daily Progress     Patient Name: Kathy Posada  Date: 2018  Visit #: 2  PTA visit #:  NA  Referral Diagnosis: Low back pain  Referring provider: Wilma Holt FNP  Visit Diagnosis:     ICD-10-CM    1. Left lumbar radiculitis M54.16    2. Muscle weakness (generalized) M62.81      Kathy Posada is a 62 y.o. female who presents to therapy today with chief complaints of L sided low back pain with pain radiating into her L LE. Onset date of sx was 2017.  Pt reported h/o prediabetes, exogenous obesity, and hyperlipidemia.  Pain symptoms are very achy with dull pain in the back.  Functional impairments include sleeping, bending, lifting, standing for long periods, walking, and sitting for longer periods.  Pt demo's signs and sx consistent with L lumbar radiculitis L4/5.     Precautions / Restrictions : None    Assessment:     HEP/POC compliance is  good .  The patient demonstrates increased pain with sitting and decreased pain with standing, suspecting a disc issue.  She was given ELAINE for exercise to introduce extension based exercise; patient did not have pain with this in clinic.  She reports improvements in her pain level after treatment and is appropriate to continue with PT services at this time.    Goal Status:  Pt. will be independent with home exercise program in : 6 weeks  Pt will: be able to walk 30 minutes without pain; in 8 weeks  Pt will: be able to lift her grandson without pain; in 8 weeks  Pt will: be able to sleep through the night without waking d/t pain; in 8 weeks    Plan / Patient Education:     Continue with initial plan of care.  Progress with home program as tolerated.  Mobilizations either S/L or prone, depending on symptoms.  Progress extension exercises if beneficial.    Subjective:     Pain Ratin  The patient reports that she woke up with pain in her leg multiple times, but in the morning, it was not bothering her.  Everything felt good after her last PT  session and slept better.  Last night was just not a good night.  She reports that now standing is better and sitting is more bothersome.    Objective:     Increased pain in lumbar spine with repeated extension.    Appt time: 9:03AM - 9:28AM    Exercise #1: Ab sets  Comment #1: HEP  Exercise #2: Supine marching  Comment #2: HEP  Exercise #3: Cat/cow  Comment #3: HEP  Exercise #4: Slump sliders  Comment #4: x 15 L  Exercise #5: Prone on elbows  Comment #5: x 1 minute    Treatment Today     TREATMENT MINUTES COMMENTS   Evaluation     Self-care/ Home management     Manual therapy 15 R S/L L lumbar rotation mobs grades I-III  Prone PA mobs to L5-L3 x 30 x 3 grade III   Neuromuscular Re-education     Therapeutic Activity     Therapeutic Exercises 10 NUSTEP x 5 minutes WL 5.0; subjective measures taken  See flowsheet   Gait training     Modality__________________                Total 25    Blank areas are intentional and mean the treatment did not include these items.       Lisa Marin, PT, DPT  2/9/2018

## 2021-06-15 NOTE — PROGRESS NOTES
[facility]   [facility]-Pelvic Initial Evaluation    Patient Name: Kathy Posada  Date of evaluation: 2/6/2018  Referral Diagnosis: Low back pain  Referring provider: Wilma Holt FNP  Visit Diagnosis:     ICD-10-CM    1. Left lumbar radiculitis M54.16    2. Muscle weakness (generalized) M62.81        Assessment:        Kathy Posada is a 62 y.o. female who presents to therapy today with chief complaints of L sided low back pain with pain radiating into her L LE. Onset date of sx was December 2017.  Pt reported h/o prediabetes, exogenous obesity, and hyperlipidemia.  Pain symptoms are very achy with dull pain in the back.  Functional impairments include sleeping, bending, lifting, standing for long periods, walking, and sitting for longer periods.  Pt demo's signs and sx consistent with L lumbar radiculitis L4/5.   Pt. is appropriate for skilled PT intervention as outlined in the Plan of Care (POC).  Pt. is a good candidate for skilled PT services to improve pain levels and function.    Goals:  Pt. will be independent with home exercise program in : 6 weeks  Pt will: be able to walk 30 minutes without pain; in 8 weeks  Pt will: be able to lift her grandson without pain; in 8 weeks  Pt will: be able to sleep through the night without waking d/t pain; in 8 weeks    Patient's expectations/goals are realistic.    Barriers to Learning or Achieving Goals:  No Barriers.       Plan / Patient Instructions:        Plan of Care:   Communication with: Referral Source  Patient Related Instruction: Nature of Condition;Treatment plan and rationale;Self Care instruction;Basis of treatment;Body mechanics;Posture  Times per Week: 1-2  Number of Weeks: 6-12  Number of Visits: 12  Precautions / Restrictions : None  Therapeutic Exercise: ROM;Stretching;Strengthening  Neuromuscular Reeducation: posture;kinesio tape;core  Manual Therapy: soft tissue mobilization;myofascial release;joint mobilization;muscle energy;strain  counterstrain  Modalities: electrical stimulation;ultrasound;traction    POC and pathology of condition were reviewed with patient.  Pt. is in agreement with the Plan of Care  A Home Exercise Program (HEP) was initiated today.  Pt. was instructed in exercises by PT and patient was given a handout with detailed instructions.    Plan for next visit: Continue with S/L      Subjective:       The patient reports that her pain started in December.  In the winter months, she doesn't do much exercise, but during the summer she swims a lot.  She had been suffering with it for about a month and taking Advil.  She then started some mm relaxers.  Now, she has pain that radiates into her L leg/hip, which started a couple of weeks ago.    Social information:   Living Situation:single family home   Occupation:retired   Work Status:NA   Equipment Available: None    Pain Ratin  Pain rating at best: 2  Pain rating at worst: 9  Pain description: very achy, dull in the back    Functional limitations are described as occurring with:   sleeping, bending, lifting, standing for long periods, walking, sitting for longer periods    Patient reports benefit from:  Mm relaxers, Advil, ice, heat       Objective:      Note: Items left blank indicates the item was not performed or not indicated at the time of the evaluation.    Patient Outcome Measures :    Modified Oswestry Low Back Pain Disablity Questionnaire  in %: 24   Scores range from 0-100%, where a score of 0% represents minimal pain and maximal function. The minimal clinically important difference is a score reduction of 12%.    Examination  1. Left lumbar radiculitis     2. Muscle weakness (generalized)       Involved side: Left  Posture Observation:      General standing posture is fair.  Lumbopelvic complex: Moderately increased lumbar lordosis    Lumbar ROM:    Date: 18     *Indicate scale AROM AROM AROM   Lumbar Flexion Pain     Lumbar Extension Min limited      Right Left  Right Left Right Left   Lumbar Sidebending WNL WNL       Lumbar Rotation WNL WNL       Thoracic Flexion      Thoracic Extension      Thoracic Sidebending         Thoracic Rotation           Lower Extremity Strength:     Date: 02/06/18     LE strength/5 Right Left Right Left Right Left   Hip Flexion (L1-3) 5 5       Hip Extension (L5-S1)         Hip Abduction (L4-5)         Hip Adduction (L2-3)         Hip External Rotation         Hip Internal Rotation         Knee Extension (L3-4) 5 5       Knee Flexion 5 5       Ankle Dorsiflexion (L4-5) 5 5       Great Toe Extension (L5)         Ankle Plantar flexion (S1)         Abdominals        Sensation            Reflex Testing  Lumbar Dermatomes Right Left UE Reflexes Right Left   Iliac Crest and Groin (L1)   Biceps (C5-6)     Anterior Medial Thigh (L2)   Brachioradialis (C5-6)     Anterior Thigh, Medial Epicondyle Femur (L3)   Triceps (C7-8)     Lateral Thigh, Anterior Knee, Medial Leg/Malleolus (L4)   Yosef s test     Lateral Leg, Dorsal Foot (L5)   LE Reflexes     Lateral Foot (S1)   Patellar (L3-4)     Posterior Leg (S2)   Achilles (S1-2)     Other:   Babinski Response       Palpation: Tender L lower lumbar paraspinal mm and hip rotator mm.    Lumbar Special Tests:     Lumbar Special Tests Right Left SI Tests Right  Left   Quadrant test   SI Compression - -   Straight leg raise WNL + at 60 degrees SI Distraction - -   Crossover response   POSH Test     Slump - + Sacral Thrust - -   Sit-up test  FADIR     Trunk extensor endurance test  Resisted Abduction     Prone instability test  Other: Thigh thrust - -   Pubic shotgun  Other: Hip scour - -     Repeated Motion Testing:  Peripheralizes with Extension    Passive Mobility - Joint Integrity:  Hypomobile  Painful L3-L5 L facets    LE Screen:  WNL    Appt time: 11:08AM - 12:01PM    Treatment Today     TREATMENT MINUTES COMMENTS   Evaluation 19 Low complexity lumbar evaluation   Self-care/ Home management     Manual  therapy 10 R S/L L lumbar rotation mobs grades I-III   Neuromuscular Re-education     Therapeutic Activity     Therapeutic Exercises 24 Demo/performance of HEP  Patient educated on pathology  Discussed POC   Gait training     Modality__________________                Total 52    Blank areas are intentional and mean the treatment did not include these items.     PT Evaluation Code: (Please list factors)  Patient History/Comorbidities: prediabetes, exogenous obesity, and hyperlipidemia  Examination: +Slump L, +SLR L, pain and hypomobility L4/5  Clinical Presentation: Stable  Clinical Decision Making: Low complexity    Patient History/  Comorbidities Examination  (body structures and functions, activity limitations, and/or participation restrictions) Clinical Presentation Clinical Decision Making (Complexity)   No documented Comorbidities or personal factors 1-2 Elements Stable and/or uncomplicated Low   1-2 documented comorbidities or personal factor 3 Elements Evolving clinical presentation with changing characteristics Moderate   3-4 documented comorbidities or personal factors 4 or more Unstable and unpredictable High            Lisa Marin, PT, DPT  2/6/2018  1:42 PM

## 2021-06-16 PROBLEM — E66.9 OBESITY (BMI 30-39.9): Status: ACTIVE | Noted: 2019-03-01

## 2021-06-16 PROBLEM — E11.9 DIABETES TYPE 2, CONTROLLED (H): Status: ACTIVE | Noted: 2018-02-20

## 2021-06-16 PROBLEM — N90.4 LICHEN SCLEROSUS OF FEMALE GENITALIA: Status: ACTIVE | Noted: 2019-06-10

## 2021-06-16 NOTE — PROGRESS NOTES
I met with Kathy Posada at the request of Dr Clark to recheck her blood pressure.  Blood pressure medications on the MAR were reviewed with patient.    Patient has taken all medications as per usual regimen: Yes; Started on Lisinopril 5 mg   Patient reports tolerating them without any issues or concerns: Yes    Vitals:    03/06/18 0902   BP: 128/70   Patient Site: Left Arm   Patient Position: Sitting   Cuff Size: Adult Large   Pulse: 86       Blood pressure was taken, previous encounter was reviewed, recorded blood pressure below 140/90.  Patient was discharged and the note will be sent to the provider for final review.

## 2021-06-16 NOTE — PROGRESS NOTES
[facility] Daily Progress     Patient Name: Kathy Posada  Date: 2018  Visit #: 3  PTA visit #:  NA  Referral Diagnosis: Low back pain  Referring provider: Wilma Holt FNP  Visit Diagnosis:     ICD-10-CM    1. Left lumbar radiculitis M54.16    2. Muscle weakness (generalized) M62.81      Kathy Posada is a 62 y.o. female who presents to therapy today with chief complaints of L sided low back pain with pain radiating into her L LE. Onset date of sx was 2017.  Pt reported h/o prediabetes, exogenous obesity, and hyperlipidemia.  Pain symptoms are very achy with dull pain in the back.  Functional impairments include sleeping, bending, lifting, standing for long periods, walking, and sitting for longer periods.  Pt demo's signs and sx consistent with L lumbar radiculitis L4/5.     Precautions / Restrictions : None    Assessment:     HEP/POC compliance is  good .  The patient demonstrates improvements in her symptoms with extension position.  She continues to get numbness symptoms with sitting and sleeping.  She is appropriate to continue with PT services at this time.    Goal Status:  Pt. will be independent with home exercise program in : 6 weeks  Pt will: be able to walk 30 minutes without pain; in 8 weeks  Pt will: be able to lift her grandson without pain; in 8 weeks  Pt will: be able to sleep through the night without waking d/t pain; in 8 weeks    Plan / Patient Education:     Continue with initial plan of care.  Progress with home program as tolerated.  Mobilizations prone, depending on symptoms.  Progress extension exercises if beneficial.  Consider dropping to 1x/week to focus on HEP.    Subjective:     Pain Ratin  The patient reports that her back is a little sore.  She does fine when she is walking.  Anytime she lies down or sits, that's when the numbness goes into her leg.    Objective:     No pain with lumbar ROM.    Appt time: 10:01AM - 10:27AM    Exercise #1: Ab sets  Comment  #1: HEP  Exercise #2: Supine marching  Comment #2: HEP  Exercise #3: Cat/cow  Comment #3: HEP  Exercise #4: Slump sliders  Comment #4: Changed to prone femoral nn sliders x 10  Exercise #5: Prone on elbows  Comment #5: Progressed to prone press ups x 10  Exercise #6: Prone leg lifts  Comment #6: x 10 bilaterally    Treatment Today     TREATMENT MINUTES COMMENTS   Evaluation     Self-care/ Home management     Manual therapy 15 Prone PA mobs to L2-L4 x 30 x 3 grade III  Prone on elbows PA mobs to L2-L4 x 30 x 3 grade II-III   Neuromuscular Re-education     Therapeutic Activity     Therapeutic Exercises 11 NUSTEP x 5 minutes WL 5.0; subjective measures taken  See flowsheet   Gait training     Modality__________________                Total 26    Blank areas are intentional and mean the treatment did not include these items.       Lisa Marin, PT, DPT  2/13/2018

## 2021-06-16 NOTE — PROGRESS NOTES
[facility] Daily Progress     Patient Name: Kathy Posada  Date: 2018  Visit #: 4  PTA visit #:  NA  Referral Diagnosis: Low back pain  Referring provider: Wilma Holt FNP  Visit Diagnosis:     ICD-10-CM    1. Left lumbar radiculitis M54.16    2. Muscle weakness (generalized) M62.81      Kathy Posada is a 62 y.o. female who presents to therapy today with chief complaints of L sided low back pain with pain radiating into her L LE. Onset date of sx was 2017.  Pt reported h/o prediabetes, exogenous obesity, and hyperlipidemia.  Pain symptoms are very achy with dull pain in the back.  Functional impairments include sleeping, bending, lifting, standing for long periods, walking, and sitting for longer periods.  Pt demo's signs and sx consistent with L lumbar radiculitis L4/5.     Precautions / Restrictions : None    Assessment:     HEP/POC compliance is  good .  The patient demonstrates overall improvements after last session in her low back pain as well as the symptoms in her leg.  The symptoms in her lower leg are still present, especially when lying down.  She is appropriate to continue with PT services at this time.    Goal Status:  Pt. will be independent with home exercise program in : 6 weeks  Pt will: be able to walk 30 minutes without pain; in 8 weeks  Pt will: be able to lift her grandson without pain; in 8 weeks  Pt will: be able to sleep through the night without waking d/t pain; in 8 weeks    Plan / Patient Education:     Continue with initial plan of care.  Progress with home program as tolerated.  Mobilizations prone or traction mobs, depending on symptoms and changes.  Progress extension exercises if beneficial.  If patient needs another appointment next week, do 2x next week.    Subjective:     Pain Ratin  The patient reports that her back is better.  She is not putting ice on it anymore.  When she gets up in the morning, she is not having much pain.  She feels like there is  an improvement in her leg symptoms, but they come on when she is lying down.  The symptoms are in the lateral aspect of her lower leg and across the top of the foot.    Objective:     Pain with lumbar flexion into her L LE.    Appt time: 8:59AM - 9:27AM    Exercise #1: Ab sets  Comment #1: HEP  Exercise #2: Supine marching  Comment #2: HEP  Exercise #3: Cat/cow  Comment #3: HEP  Exercise #4: Slump sliders  Comment #4: prone femoral nn sliders HEP  Exercise #5: Prone on elbows  Comment #5: prone press ups  Exercise #6: Prone leg lifts  Comment #6: Changed to quadruped leg extension x 10 bilaterally    Treatment Today     TREATMENT MINUTES COMMENTS   Evaluation     Self-care/ Home management     Manual therapy 16 Prone on slight elbows PA mobs to L1-L4 x 30 x 3 grade II-III  Supine bilateral leg pull traction mobs grades II-III   Neuromuscular Re-education     Therapeutic Activity     Therapeutic Exercises 12 NUSTEP x 5 minutes WL 5.0; subjective measures taken  See flowsheet  Discussed going back to anti-inflammatory to see if it helps with pain in her leg; patient to follow MD instructions   Gait training     Modality__________________                Total 28    Blank areas are intentional and mean the treatment did not include these items.       Lisa Marin, PT, DPT  2/16/2018

## 2021-06-16 NOTE — PROGRESS NOTES
ASSESSMENT & PLAN:  1.  Type 2 diabetes, diet controlled  A1c results discussed today.  Discussed potentially starting metformin but she would prefer to hold off if that is reasonable.  Plan to follow-up in 3-4 months, sooner if problems or concerns.  Her A1c should improve over the next few months as she is able to get back into physical activity with improvement of her back and leg pain.  Microalbumin screening today.  Foot exam is normal.  She states she has an upcoming eye exam and she was advised to call ahead and make sure that the provider could do a full diabetic eye exam at that visit.  Patient takes a baby aspirin daily.  Hyperlipidemia as below.  Borderline blood pressure today, suspect could improve with exercise and weight loss and she has not been able to exercise for the last few months with the back and leg issue.  Continue to closely monitor and plan to start lisinopril if blood pressure remains in this range, although this was not discussed today, but we will discuss at next visit.  - Glycosylated Hemoglobin A1c  - Microalbumin, Random Urine    2. Hyperlipidemia  After her fasting lipids a few months ago her simvastatin dose was increased.  Recheck labs today.  - Lipid Dixon  - AST (SGOT)    3.  Low back pain and left leg radicular symptoms  She continues to follow with physical therapy, has had improvement, and continues to improve.  Offered referral to spine clinic but she would like to continue with physical therapy first.  Certainly if worsening symptoms, not continuing to improve, or if leg weakness, recommend MRI and spine clinic consult.      There are no Patient Instructions on file for this visit.    Orders Placed This Encounter   Procedures     Glycosylated Hemoglobin A1c     Microalbumin, Random Urine     There are no discontinued medications.    No Follow-up on file.    CHIEF COMPLAINT:  Chief Complaint   Patient presents with     Diabetes     3 month f/u.  labs drawn previsit        HISTORY OF PRESENT ILLNESS:  Kathy is a 62 y.o. female presenting to the clinic today for diabetes check. She is newly diagnosed with diabetes, was prediabetic in the past. She is followed by Sherrell [facility] in [facility]. Last A1c was 6.6%, today it is 6.8%. She has not done much physical activity in the last 3 months as she hurt her back. She has not been on metformin in the past. She would like to avoid taking medications, if possible. Her fasting blood glucose levels average in the 130s. She is excited to begin exercising again.    Leg and Back Pain: She follows with PT once a week. She continues to feel numbness down her left leg to her toes. The pain is more manageable, and numbness is improving. No weakness. Reports that in the past, the pain would keep her up at night. She states the therapist mentioned possibility of injection.    Hyperlipidemia: She continues on 20 mg simvastatin daily.     Health Maintenance: She agrees to a pneumonia shot today.     REVIEW OF SYSTEMS:   She takes baby aspirin. All other systems are negative.    PFSH:  Social: She likes to swim. Retired in January.    TOBACCO USE:  History   Smoking Status     Never Smoker   Smokeless Tobacco     Never Used       VITALS:  Vitals:    02/20/18 0859   BP: 138/88   Patient Site: Right Arm   Patient Position: Sitting   Cuff Size: Adult Large   Pulse: 88   Resp: 18   Temp: 97.9  F (36.6  C)   TempSrc: Oral   Weight: 195 lb (88.5 kg)     Wt Readings from Last 3 Encounters:   02/20/18 195 lb (88.5 kg)   01/11/18 196 lb 9 oz (89.2 kg)   11/14/17 197 lb 8 oz (89.6 kg)     Body mass index is 33.21 kg/(m^2).    PHYSICAL EXAM:  GENERAL:  Pleasant, well-appearing patient in no acute distress.  VITAL SIGNS:  Reviewed.  HEENT:  Sclerae and conjunctivae clear.  Oropharynx is clear with moist mucous membranes.  NECK:  Supple without lymphadenopathy or thyromegaly.  No carotid bruits.  CARDIOVASCULAR:  Heart regular rate and rhythm without  murmur.  Normal S1 and S2.  LUNGS:  Clear to auscultation bilaterally without wheezes or crackles.  Good air movement throughout.  FEET:  warm, without edema, no deformities, no skin lesions or skin breakdown, monofilament testing normal and pedal pulses palpable. NEURO: Alert and oriented.   PSYCHIATRIC: Presents on time and well groomed.  Normal speech and thought content.  Full affect.  No abnormal movements or behaviors noted.      The following high BMI interventions were performed this visit: encouragement to exercise and nutrition/feeding management    ADDITIONAL HISTORY SUMMARIZED (2): Reviewed 11/29/17 note regarding diabetes care and 2/16/18 PT note regarding leg pain.  DECISION TO OBTAIN EXTRA INFORMATION (1): None.   RADIOLOGY TESTS (1): None.  LABS (1): Labs were ordered today.  MEDICINE TESTS (1): None.  INDEPENDENT REVIEW (2 each): None.     The visit lasted a total of 15 minutes face to face with the patient. Over 50% of the time was spent counseling and educating the patient about diabetes.    IAmita, am scribing for and in the presence of, Dr. Clark.    I, Dr. Clark, personally performed the services described in this documentation, as scribed by Amita Tellez in my presence, and it is both accurate and complete.    MEDICATIONS:  Current Outpatient Prescriptions   Medication Sig Dispense Refill     blood glucose test (CONTOUR NEXT STRIPS) strips Use 1 each As Directed 2 (two) times a day. 200 strip 4     clobetasol (TEMOVATE) 0.05 % ointment APPLY TO AFFECTED SKIN AS INSTRUCTED 30 g 1     generic lancets Use 1 each As Directed 2 (two) times a day. 200 each 4     simvastatin (ZOCOR) 20 MG tablet TAKE ONE TABLET BY MOUTH ONE TIME DAILY 90 tablet 2     cyclobenzaprine (FLEXERIL) 5 MG tablet Take 1 tablet (5 mg total) by mouth 3 (three) times a day as needed for muscle spasms. 30 tablet 0     No current facility-administered medications for this visit.        Total data points: 3

## 2021-06-16 NOTE — PROGRESS NOTES
[facility] Daily Progress     Patient Name: Kathy Posada  Date: 2018  Visit #: 5  PTA visit #:  NA  Referral Diagnosis: Low back pain  Referring provider: Wilma Holt FNP  Visit Diagnosis:     ICD-10-CM    1. Left lumbar radiculitis M54.16    2. Muscle weakness (generalized) M62.81      Kathy Posada is a 62 y.o. female who presents to therapy today with chief complaints of L sided low back pain with pain radiating into her L LE. Onset date of sx was 2017.  Pt reported h/o prediabetes, exogenous obesity, and hyperlipidemia.  Pain symptoms are very achy with dull pain in the back.  Functional impairments include sleeping, bending, lifting, standing for long periods, walking, and sitting for longer periods.  Pt demo's signs and sx consistent with L lumbar radiculitis L4/5.     Precautions / Restrictions : None    Assessment:     HEP/POC compliance is  good .  The patient reports significant improvements in her low back pain and LE symptoms, which she has not had for the past 3 days.  She is progressing well toward goals and appropriate to continue with PT services at this time.    Goal Status:  Pt. will be independent with home exercise program in : 6 weeks  Pt will: be able to walk 30 minutes without pain; in 8 weeks  Pt will: be able to lift her grandson without pain; in 8 weeks  Pt will: be able to sleep through the night without waking d/t pain; in 8 weeks    Plan / Patient Education:     Continue with initial plan of care.  Progress with home program as tolerated.  Multifidus strength next session.  Plan to do next session 2-3 weeks out.    Subjective:     Pain Ratin  The patient reports that her back is feeling much better.  She has been taking Advil and icing, but is feeling a lot better.  She has not been woken up at night since her last appointment.  The only twinge she had was last night when she rolled funny and felt it on the outside of her L leg.  She has not been taking  Advil since Sunday without any increase in her symptoms.  The swelling in her foot and numbness in her leg have been gone.    Objective:     Lumbar ROM is WNL without pain.  No pain with passive movement to lumbar spine.    Appt time: 10:03AM - 11:02AM    Exercise #1: Ab sets  Comment #1: HEP  Exercise #2: Supine marching  Comment #2: HEP  Exercise #3: Cat/cow  Comment #3: HEP  Exercise #4: Slump sliders  Comment #4: prone femoral nn sliders x 10  Exercise #5: Prone on elbows  Comment #5: prone press ups HEP  Exercise #6: Prone leg lifts  Comment #6: Quadruped Birddog x 10  Exercise #7: Plank  Comment #7: On elbows and knees x 10 seconds x 6    Treatment Today     TREATMENT MINUTES COMMENTS   Evaluation     Self-care/ Home management     Manual therapy     Neuromuscular Re-education     Therapeutic Activity     Therapeutic Exercises 29 NUSTEP x 5 minutes WL 5.0; subjective measures taken  See flowsheet   Gait training     Modality__________________                Total 29    Blank areas are intentional and mean the treatment did not include these items.       Lisa Marin, PT, DPT  2/21/2018

## 2021-06-16 NOTE — PROGRESS NOTES
[facility] Daily Progress     Patient Name: Kathy Posada  Date: 2018  Visit #: 5  PTA visit #:  NA  Referral Diagnosis: Low back pain  Referring provider: Wilma Holt FNP  Visit Diagnosis:     ICD-10-CM    1. Left lumbar radiculitis M54.16    2. Muscle weakness (generalized) M62.81      Kathy Posada is a 62 y.o. female who presents to therapy today with chief complaints of L sided low back pain with pain radiating into her L LE. Onset date of sx was 2017.  Pt reported h/o prediabetes, exogenous obesity, and hyperlipidemia.  Pain symptoms are very achy with dull pain in the back.  Functional impairments include sleeping, bending, lifting, standing for long periods, walking, and sitting for longer periods.  Pt demo's signs and sx consistent with L lumbar radiculitis L4/5.     Precautions / Restrictions : None    Assessment:     HEP/POC compliance is  good .  The patient reports no issues since last PT session and is progressing well toward all goals.  She is appropriate for one more visit to finalize HEP and self management of condition.    Goal Status:  Pt. will be independent with home exercise program in : 6 weeks  Pt will: be able to walk 30 minutes without pain; in 8 weeks  Pt will: be able to lift her grandson without pain; in 8 weeks  Pt will: be able to sleep through the night without waking d/t pain; in 8 weeks    Plan / Patient Education:     Continue with initial plan of care.  Progress with home program as tolerated.  Plan to DC next session if patient is doing well.    Subjective:     Pain Ratin  The patient reports that she has been doing well.  Planks have been the hardest, but she has been able to increase her hold time up to 15-20 seconds at a time.  She plans to go to [employer] and walk around tomorrow to see how everything goes.  She was able to sit on the floor and play with her grandkids without any issues over the weekend.  She has also not had any symptoms into her  leg since her last session.    Objective:     Lumbar ROM is WNL without pain.  No pain with passive movement to lumbar spine.    Appt time: 10:30AM - 10:55AM    Exercise #1: Ab sets  Comment #1: HEP  Exercise #2: Supine marching  Comment #2: HEP  Exercise #3: Cat/cow  Comment #3: HEP  Exercise #4: Slump sliders  Comment #4: prone femoral nn sliders HEP  Exercise #5: Prone on elbows  Comment #5: prone press ups HEP  Exercise #6: Prone leg lifts  Comment #6: Quadruped Birddog x 10  Exercise #7: Plank  Comment #7: On elbows and knees x 20 seconds x 3; bilateral side on knees x 20 seconds x 2  Exercise #8: Multifidus strengthening  Comment #8: x 10 L3    Treatment Today     TREATMENT MINUTES COMMENTS   Evaluation     Self-care/ Home management     Manual therapy     Neuromuscular Re-education     Therapeutic Activity     Therapeutic Exercises 25 NUSTEP x 5 minutes WL 5.0; subjective measures taken  See flowsheet   Gait training     Modality__________________                Total 25    Blank areas are intentional and mean the treatment did not include these items.       Lisa Marin, PT, DPT  2/28/2018

## 2021-06-16 NOTE — PROGRESS NOTES
[facility] Daily Progress / Discharge Summary    Patient Name: Kathy Posada  Date: 3/20/2018  Visit #: 6  PTA visit #:  NA  Referral Diagnosis: Low back pain  Referring provider: Wilma Holt  Visit Diagnosis:     ICD-10-CM    1. Left lumbar radiculitis M54.16    2. Muscle weakness (generalized) M62.81          Assessment:   HEP/POC compliance is  good .  Patient has benefitted from skilled physical therapy and is making steady progress toward functional goals.    Goal Status:  Pt. will be independent with home exercise program in : 6 weeks;Met  Pt will: be able to walk 30 minutes without pain; in 8 weeks; Met  Pt will: be able to lift her grandson without pain; in 8 weeks; Met  Pt will: be able to sleep through the night without waking d/t pain; in 8 weeks; Met    Plan / Patient Education:     Initial plan of care has been completed. Patient has responded appropriately to skilled PT intervention.  The patient met goals and has demonstrated understanding of/independence in the home program for self-care and progression to next steps.  No further therapy is required at this time.  The patient will initiate contact if questions or concerns arise.    Subjective:     Pain Ratin  The patient reports that she went to [place] since she was here last and was able to walk 7 miles one day and 4+ miles other days without any pain.  She was stiff afterwards, but nothing worse than that.  She reports a 100% improvement since beginning PT.    Patient Outcome Measures  Modified Oswestry Low Back Pain Disablity Questionnaire  in %: 0   Scores range from 0-100%, where a score of 0% represents minimal pain and maximal function. The minimal clinically important difference is a score reduction of 12%.    Objective:     (-) Slump  (-) SLR  Lumbar ROM is WNL    Exercise #1: Ab sets  Comment #1: HEP  Exercise #2: Supine marching  Comment #2: Progressed to LE extension x 10  Exercise #3: Cat/cow  Comment #3:  HEP  Exercise #4: Slump sliders  Comment #4: prone femoral nn sliders HEP  Exercise #5: Prone on elbows  Comment #5: prone press ups HEP  Exercise #6: Prone leg lifts  Comment #6: Quadruped Birddog HEP  Exercise #7: Plank  Comment #7: Ob elbows and knees, side plank also HEP; showed patient full plank to progress to when ready  Exercise #8: Multifidus strengthening  Comment #8: x 10 L4    Appt time: 10:00AM - 10:28AM    Treatment Today  TREATMENT MINUTES COMMENTS   Evaluation     Self-care/ Home management     Manual therapy     Neuromuscular Re-education     Therapeutic Activity     Therapeutic Exercises 28 NUSTEP x 5 minutes WL 5.0; subjective measures taken  Objective measures taken  Discussed POC/HEP  See flowsheet   Gait training     Modality__________________                Total 28    Blank areas are intentional and mean the treatment did not include these items.     Lisa Marin, PT, DPT  3/20/2018

## 2021-06-17 NOTE — PATIENT INSTRUCTIONS - HE
Patient Instructions by Mana Lynch MD at 6/10/2019  9:50 AM     Author: Mana Lynch MD Service: -- Author Type: Physician    Filed: 6/10/2019 10:18 AM Encounter Date: 6/10/2019 Status: Addendum    : Mana Lynch MD (Physician)    Related Notes: Original Note by Mana Lynch MD (Physician) filed at 6/10/2019 10:05 AM       Patient Education     Diabetes and Heart Disease     Take your medicines as directed each day, even if you feel fine.     If you have diabetes, you are two to four times more likely to have heart disease than someone without diabetes. This higher risk is due to diabetes, but it is also due to other risk factors for heart disease that happen in people with diabetes. But theres good news. You can help control your health risks by making some changes in your life. You can take steps to reduce your risk of heart disease by half--similar to the risk in people who don't have diabetes.  Your main risk factors  Three major risk factors for heart disease are high blood sugar, high blood pressure, and high levels of lipids. By keeping risk factors under control, you can help keep your heart and arteries healthy. This may reduce your chances of a heart attack.    Blood sugar. High blood sugar can make artery walls tough and rough. Plaque (waxy material in the blood) can then build up along the artery walls, making it harder for blood to flow through the arteries. Having high blood sugar increases the chances of having high blood pressure and high cholesterol.    Blood pressure. When blood pressure is high all the time it causes your heart to work harder to pump blood. Artery walls become damaged. This increases the risk for plaque build up.    Lipids. The body needs some lipids in the blood to stay healthy. But lipid levels that are too high can damage the artery walls. Lipids include cholesterol and triglycerides. There are two kinds of cholesterol. LDL (bad) cholesterol can  damage the arteries. But HDL (good) cholesterol helps clear LDL cholesterol from the blood vessels. This helps keep the arteries healthy. When blood sugar is high, the level of triglycerides in the blood may also be high. High blood triglyceride levels can cause plaque to form.   Other risk factors  Certain lifestyle factors can increase levels of your blood sugar, blood pressure, and lipids. Such increases raise your risk of heart disease:    Smoking damages the lining of your arteries. This allows plaque to build up in the artery walls. Smoking also constricts (narrows) the arteries. This can raise blood pressure and cause chest pain or angina. Smoking also increases your risk of getting type 2 diabetes.    Not being active makes it harder for your heart to do its work. Inactivity is linked to many other risk factors, such as high blood pressure and poor cholesterol levels. Inactivity also increases your risk of getting type 2 diabetes.    Being overweight makes it harder for your body to use insulin. It also makes your heart work too hard. Being overweight is also the main contributor to the development of type 2 diabetes,   Changes you can make  Following a few simple steps can help keep your risk factors under control. Work with your healthcare team to reach your goals.    Quitting smoking could save your life. Smoking damages the lining of the blood vessels and raises blood pressure. Smoking also affects how your body uses insulin. This makes it harder to keep blood sugar under control. If you smoke and need help quitting, talk to your healthcare team.     Testing your blood sugar is the only way to know whether it is under control. Be sure to test your blood sugar yourself. Also get your blood tested in the lab, as directed.    Monitoring your blood pressure and lipid levels can help you achieve safe levels. Visit your healthcare team as scheduled.    Taking medicines as directed can help control blood sugar,  blood pressure, blood clotting, and/or cholesterol levels.    Eating right can reduce your risk factors and help you lose weight. Try to limit the amount of processed or refined carbohydrates you eat at one time. Cut back on your total calorie intake. Eat foods low in saturated fat and cholesterol. Eat fiber, including vegetables and whole grains, and cut down on salt. A dietitian or diabetes educator can help form a meal plan that works for you--even if you are on a low budget.     Being active can help reduce your weight, strengthen your heart, and lower your lipid levels and blood pressure. Exercise and activity are good for your whole body. Talk to your healthcare team about increasing your activity safely over time.    Keeping your appointments with your healthcare provider helps you stay healthy. Go in for checkups and lab tests as scheduled.  Date Last Reviewed: 5/19/2016 2000-2017 [employer]. [address] [place] [address]. All rights reserved. This information is not intended as a substitute for professional medical care. Always follow your healthcare professional's instructions.         s. I recommend 1200mg of calcium and 1000 IU daily of vitamin D, as well as regular weight bearing exercise to help prevent worsening bone density. You should repeat the bone density screening again in another 1years

## 2021-06-17 NOTE — TELEPHONE ENCOUNTER
Medication refill request declined: requested refill too soon   Disp Refills Start End     atorvastatin (LIPITOR) 20 MG tablet 90 tablet 3 3/28/2021     Sig - Route: Take 2 tablets (40 mg total) by mouth at bedtime. - Oral    Sent to pharmacy as: atorvastatin 20 mg tablet (LIPITOR)    E-Prescribing Status: Receipt confirmed by pharmacy (3/28/2021  1:33 PM CDT)      Jenny Waggoner RN [facility] Triage/Med Refill 5/20/2021 12:37 PM

## 2021-06-17 NOTE — TELEPHONE ENCOUNTER
Requested Prescriptions     Pending Prescriptions Disp Refills     atorvastatin (LIPITOR) 20 MG tablet 90 tablet 3     Sig: Take 2 tablets (40 mg total) by mouth at bedtime.

## 2021-06-17 NOTE — PATIENT INSTRUCTIONS - HE
Patient Instructions by Mana Lynch MD at 3/1/2019  8:30 AM     Author: Mana Lynch MD Service: -- Author Type: Physician    Filed: 3/1/2019  9:19 AM Encounter Date: 3/1/2019 Status: Signed    : Mana Lynch MD (Physician)       Patient Education     A1C  Does this test have other names?  Hemoglobin A1c; HbA1c; glycosylated hemoglobin; glycohemoglobin; Glycated hemoglobin  What is this test?  A1C is a blood test that shows average blood sugar (glucose) levels over the last 3 months. The test is done to find out if a person has diabetes or prediabetes. It's also used to see how well a person with diabetes controls their blood sugar. The test can help guide diabetes treatment over time.  Why do I need this test?  You may need this test to check for prediabetes or diabetes.  If you have diabetes or prediabetes, you may need this test to see how well you control your blood sugar. People with diabetes need to track their blood sugar (glucose) levels every day to make sure they arent too high or too low. The A1C test gives results for a longer period of time. It shows if your blood sugar has been too high on average over the last 3 months.   Glucose sticks to hemoglobin in the blood. Hemoglobin is a protein in red blood cells that carries oxygen. When blood sugar is high, more glucose builds up and sticks to the hemoglobin. The A1C test measures how much of the hemoglobin is coated with sugar.  You may have the test when a healthcare provider first works with you to treat your diabetes. You may then need to have the A1C test 2 or more times a year. This depends on the type of diabetes and how well its controlled. The American Diabetes Association (ADA) advises an A1C test at least 2 times a year if you are meeting your blood sugar goals. If you arent meeting your goals or your medicine has changed, you should have the A1C test more often.  What other tests might I have along with this test?   If  your healthcare provider tests you for diabetes, you may also have any of these tests:    Fasting plasma glucose blood test (FPG)    Oral glucose tolerance test (OGTT)    Urine test to check for sugar, ketones, or protein  What do my test results mean?  Test results may vary depending on your age, gender, health history, the method used for the test, and other things. Your test results may not mean you have a problem. Ask your healthcare provider what your test results mean for you.   A1C results are reported as a percentage. Here are what the results mean:    A1C below 5.7%. This is normal.    A1C from 5.7% to 6.4%. You may have prediabetes. This means you have a higher risk for diabetes in the future.    A1C of 6.5% or above on 2 separate tests. You may have diabetes.   The ADA says that people with diabetes should keep an A1C below 7%. The American Association of Clinical Endocrinologists advises an A1C of 6.5% or less. Your healthcare provider may give you other advice. This is based on your age, health conditions, and other things.    How is this test done?  The test is done with a blood sample. A needle is used to draw blood from a vein in your arm or hand.   Does this test pose any risks?  Having a blood test with a needle carries some risks. These include bleeding, infection, bruising, and feeling lightheaded. When the needle pricks your arm or hand, you may feel a slight sting or pain. Afterward, the site may be sore.  What might affect my test results?  Your blood sugar levels change throughout the day. This won't affect the A1C test result.  If you have sickle cell anemia or other blood disorders, an A1C test may be less useful for diagnosing or watching diabetes. Your healthcare provider may tell you to use a different test that will work better for you.  The test results may be less accurate if you have any of the below:    Anemia    Heavy bleeding    Iron deficiency    Kidney failure    Liver  disease  How do I get ready for this test?  You don't need to get ready for the test.      [id number] [employer]. [address] [facility], [place] [address]. All rights reserved. This information is not intended as a substitute for professional medical care. Always follow your healthcare professional's instructions.

## 2021-06-18 NOTE — PATIENT INSTRUCTIONS - HE
"Patient Instructions by Mana Lynch MD at 8/27/2020  9:40 AM     Author: Mana Lynch MD Service: -- Author Type: Physician    Filed: 8/27/2020  9:59 AM Encounter Date: 8/27/2020 Status: Signed    : Mana Lycnh MD (Physician)       Patient Education     Understanding Body Mass Index (BMI)  Body mass index (BMI) is a method of screening for a weight category using the ratio of your height to your weight. The BMI is a measure of overweight that is corrected for height. Knowing your BMI is a way to tell if you are at a healthy weight. The higher your BMI, the greater your risk for weight-related health problems.  What BMI means    BMI below 18.5: Underweight    BMI 18.5 to 24.9: Healthy weight or \"ideal body weight\"     BMI 25 to 29.9: Overweight    BMI 30 and over: Obese    BMI 40 and over: Severe obesity   Online BMI Calculators  Find your BMI with an online BMI calculator tool, such as these from the CDC:    BMI calculator for adults    BMI calculator for children and teens   Using the BMI chart  To figure out your BMI, find your height and weight (or the numbers closest to them) on the table below. Follow each column of numbers to where your height and weight meet on the table. That is your BMI.    Date Last Reviewed: 7/1/2016 2000-2019 [employer]. [address] [place] [address]. All rights reserved. This information is not intended as a substitute for professional medical care. Always follow your healthcare professional's instructions.                "

## 2021-06-18 NOTE — PATIENT INSTRUCTIONS - HE
Patient Instructions by Mana Lynch MD at 3/22/2021  9:10 AM     Author: Mana Lynch MD Service: -- Author Type: Physician    Filed: 3/22/2021  9:49 AM Encounter Date: 3/22/2021 Status: Addendum    : Mana Lynch MD (Physician)    Related Notes: Original Note by Mana Lynch MD (Physician) filed at 3/22/2021  9:43 AM       There is a small increased risk of squamous cell cancer of the vulva in patients with lichen sclerosus. Adequate treatment of the disease seems to be associated with a reduced risk of development of neoplasia. The skin of patients with vulvar lichen sclerosus should be examined at least yearly, with biopsy of suspicious lesions, and women themselves should look at their skin and touch with fingertips monthly to search for thickened lumps or sores that do not heal.    Patient Education     Prevention Guidelines, Women Ages 50 to 64  Screening tests and vaccines are an important part of managing your health. A screening test is done to find possible disorders or diseases in people who don't have any symptoms. The goal is to find a disease early so lifestyle changes can be made and you can be watched more closely to reduce the risk of disease, or to detect it early enough to treat it most effectively. Screening tests are not considered diagnostic, but are used to determine if more testing is needed. Health counseling is essential, too. Below are guidelines for these, for women ages 50 to 64. Talk with your healthcare provider to make sure youre up to date on what you need.  Screening Who needs it How often   Type 2 diabetes or prediabetes All women beginning at age 45 and women without symptoms at any age who are overweight or obese and have 1 or more additional risk factors for diabetes. At  least every 3 years   Type 2 diabetes or prediabetes All women diagnosed with gestational diabetes Lifelong testing every 3 years   Type 2 diabetes All women with prediabetes  Every year   Alcohol misuse All women in this age group At routine exams   Blood pressure All women in this age group Yearly checkup if your blood pressure is normal  Normal blood pressure is less than 120/80 mm Hg  If your blood pressure reading is higher than normal, follow the advice of your healthcare provider   Breast cancer All women at average risk in this age group Yearly mammogram should be done until age 54. At age 55, you can switch to every other year or choose to continue yearly.  All women should know the possible benefits and risks of breast cancer screening with mammograms.   Cervical cancer All women in this age group, except women who have had a complete hysterectomy Pap test every 3 years or Pap test with human papillomavirus (HPV) test every 5 years   Chlamydia Women at increased risk for infection At routine exams   Colorectal cancer All women at average risk in this age group Multiple tests are available and are used at different times. Possible tests include:    Flexible sigmoidoscopy every 5 years, or    Colonoscopy every 10 years, or    CT colonography (virtual colonoscopy) every 5 years, or    Yearly fecal occult blood test, or    Yearly fecal immunochemical test every year, or    Stool DNA test, every 3 years  If you choose a test other than a colonoscopy and have an abnormal test result, you will need to follow up with a colonoscopy. Screening advice varies among expert groups. Talk with your healthcare provider about which tests are best for you.  Some people should be screened using a different schedule because of their personal or family health history. Talk with your healthcare provider about your health history.   Depression All women in this age group At routine exams   Gonorrhea Sexually active women at increased risk for infection At routine exams   Hepatitis C Anyone at increased risk; 1 time for those born between 1945 and 1965 At routine exams   High cholesterol or  triglycerides All women in this age group who are at risk for coronary artery disease At least every 5 years   HIV All women At routine exams   Lung cancer Adults age 55 to 80 who have smoked Yearly screening in smokers with 30 pack-year history of smoking or who quit within 15 years   Obesity All women in this age group At routine exams   Osteoporosis Women who are postmenopausal Ask your healthcare provider   Syphilis Women at increased risk for infection - talk with your healthcare provider At routine exams   Tuberculosis Women at increased risk for infection - talk with your healthcare provider Ask your healthcare provider   Vision All women in this age group Ask your healthcare provider   Vaccine Who needs it How often   Chickenpox (varicella) All women in this age group who have no record of this infection or vaccine 2 doses; the second dose should be given at least 4 weeks after the first dose   Hepatitis A Women at increased risk for infection - talk with your healthcare provider 2 doses given at least 6 months apart   Hepatitis B Women at increased risk for infection - talk with your healthcare provider 3 doses over 6 months; second dose should be given 1 month after the first dose; the third dose should be given at least 2 months after the second dose and at least 4 months after the first dose   Haemophilus influenzae Type B (HIB) Women at increased risk for infection - talk with your healthcare provider 1 to 3 doses   Influenza (flu) All women in this age group Once a year   Measles, mumps, rubella (MMR) Women in this age group through their late 50s who have no record of these infections or vaccines 1 dose   Meningococcal Women at increased risk for infection - talk with your healthcare provider 1 or more doses   Pneumococcal conjugate vaccine (PCV13) and pneumococcal polysaccharide vaccine (PPSV23) Women at increased risk for infection - talk with your healthcare provider PCV13: 1 dose ages 19 to 65  (protects against 13 types of pneumococcal bacteria)  PPSV23: 1 to 2 doses through age 64, or 1 dose at 65 or older (protects against 23 types of pneumococcal bacteria)   Tetanus/diphtheria/pertussis (Td/Tdap) booster All women in this age group Td every 10 years, or a 1-time dose of Tdap instead of a Td booster after age 18, then Td every 10 years   Zoster All women ages 60 and older 1 dose   Counseling Who needs it How often   BRCA gene mutation testing for breast and ovarian cancer susceptibility Women with increased risk for having gene mutation When your risk is known   Breast cancer and chemoprevention Women at high risk for breast cancer When your risk is known   Diet and exercise Women who are overweight or obese When diagnosed, and then at routine exams   Sexually transmitted infection prevention Women at increased risk for infection - talk with your healthcare provider At routine exams   Use of daily aspirin Women ages 55 and up in this age group who are at risk for cardiovascular health problems such as stroke When your risk is known   Use of tobacco and the health effects it can cause All women in this age group Every exam   1 [employer]  Date Last Reviewed: 1/26/2016 2000-2019 [employer]. [address] [place] [address]. All rights reserved. This information is not intended as a substitute for professional medical care. Always follow your healthcare professional's instructions.

## 2021-06-20 ENCOUNTER — HEALTH MAINTENANCE LETTER (OUTPATIENT)
Age: 66
End: 2021-06-20

## 2021-06-24 NOTE — TELEPHONE ENCOUNTER
Pt has an est care, med check and fasting labs appt on 3/1 with Dr. THAKUR. No further action needed.

## 2021-06-24 NOTE — TELEPHONE ENCOUNTER
First attempt: LMTCB to schedule an est care with med check and labs appt with an available provider here in clinic.

## 2021-06-24 NOTE — PROGRESS NOTES
Assessment:     1. Controlled type 2 diabetes mellitus without complication, without long-term current use of insulin (H)  Lipid [place]    Comprehensive Metabolic Panel    Glycosylated Hemoglobin A1c    Ambulatory referral to Ophthalmology    Microalbumin, Random Urine   2. Lichen sclerosus     3. Hyperlipidemia, unspecified hyperlipidemia type     4. Obesity (BMI 30-39.9)       Diabetes is worsened, currently it was diet controlled, now we will start on metformin.  Pros and cons discussed with the patient.  Patient does not like taking pills, however she is agreeable.    For lichen sclerosis, we reviewed guidelines.  Mometasone gel can be also used for cost effectiveness, the clobetasol remains the standard of care.    Discussed dietary modification and exercise.       Plan:      The diagnosis was discussed with the patient and evaluation and treatment plans outlined.  See orders for lab and imaging studies.     Subjective:      Kathy Posada is a  female who presents for evaluation of   Chief Complaint   Patient presents with     Diabetes     follow up/ med check     Hypertension     follow up/ med check     Establish Care     pt is fasting     1-Establish care    2- Diabetes : Feels that she has not been taking proper care of her diabetes.  She has gained weight.  She has not been exercising.  She informs me that after the initial diagnosis that she had really done very well.    3- HTN:  She indicates that she is feeling well and denies any symptoms referable to her elevated blood pressure.   Specifically denies chest pain, palpitations, dyspnea, orthopnea, PND or peripheral edema    4- Hyperlipidemia: On Zocor 40 mg.  She has been taking this regularly.  We reviewed her previous LDL that was not at goal.  I did inform patient that if her numbers appear still elevated, we should consider switching to Lipitor 20 mg in future increasing it to 40 mg.  Patient is agreeable    5- Lichen sclerosis: Uses clobetasol  as needed.  Feels medication is very expensive for a total amount of  $200 and she uses it sparingly.    The following portions of the patient's history were reviewed and updated as appropriate: allergies, current medications, past family history, past medical history, past social history, past surgical history and problem list.  No Known Allergies    Current Outpatient Medications on File Prior to Visit   Medication Sig Dispense Refill     aspirin 81 mg chewable tablet Chew 81 mg daily.       lisinopril (PRINIVIL,ZESTRIL) 5 MG tablet TAKE 1 TABLET BY MOUTH EVERY DAY 90 tablet 3     [DISCONTINUED] clobetasol (TEMOVATE) 0.05 % ointment APPLY TO AFFECTED SKIN AS INSTRUCTED 30 g 1     [DISCONTINUED] simvastatin (ZOCOR) 40 MG tablet Take 1 tablet (40 mg total) by mouth daily. 90 tablet 0     [DISCONTINUED] blood glucose test (CONTOUR NEXT STRIPS) strips Use 1 each As Directed 2 (two) times a day. 200 strip 4     [DISCONTINUED] cyclobenzaprine (FLEXERIL) 5 MG tablet Take 1 tablet (5 mg total) by mouth 3 (three) times a day as needed for muscle spasms. 30 tablet 0     [DISCONTINUED] generic lancets Use 1 each As Directed 2 (two) times a day. 200 each 4     No current facility-administered medications on file prior to visit.        Patient Active Problem List   Diagnosis     Exogenous Obesity     Hyperlipidemia     Joint Pain, Localized In The Shoulder     Upper Back Pain (Between Shoulder Blades)     Diabetes type 2, controlled (H)     Obesity (BMI 30-39.9)       Past Medical History:   Diagnosis Date     Lichen sclerosus        Past Surgical History:   Procedure Laterality Date     APPENDECTOMY       CHOLECYSTECTOMY         Family History   Problem Relation Age of Onset     Heart attack Mother 85         at age 86     Heart attack Father 70        Heart attack in 70s,  at age 91     No Medical Problems Sister      No Medical Problems Daughter      No Medical Problems Maternal Grandmother      No Medical Problems  Maternal Grandfather      No Medical Problems Paternal Grandmother      No Medical Problems Paternal Grandfather      No Medical Problems Maternal Aunt      No Medical Problems Paternal Aunt      BRCA 1/2 Neg Hx      Breast cancer Neg Hx      Cancer Neg Hx      Colon cancer Neg Hx      Endometrial cancer Neg Hx      Ovarian cancer Neg Hx        Social History     Socioeconomic History     Marital status:      Spouse name: None     Number of children: None     Years of education: None     Highest education level: None   Occupational History     None   Social Needs     Financial resource strain: None     Food insecurity:     Worry: None     Inability: None     Transportation needs:     Medical: None     Non-medical: None   Tobacco Use     Smoking status: Never Smoker     Smokeless tobacco: Never Used   Substance and Sexual Activity     Alcohol use: None     Drug use: None     Sexual activity: None   Lifestyle     Physical activity:     Days per week: None     Minutes per session: None     Stress: None   Relationships     Social connections:     Talks on phone: None     Gets together: None     Attends Sikhism service: None     Active member of club or organization: None     Attends meetings of clubs or organizations: None     Relationship status: None     Intimate partner violence:     Fear of current or ex partner: None     Emotionally abused: None     Physically abused: None     Forced sexual activity: None   Other Topics Concern     None   Social History Narrative    Patient is  and lives with her .  She is now retired.  The family owns a [employer] that they have sold to the son.  She has 2 adult children and 4 grandchildren.        Mana Lynch MD  3/1/2019           Review of Systems  A 12 point comprehensive review of systems was negative except as noted.       Objective:   /73 (Patient Site: Left Arm, Patient Position: Sitting, Cuff Size: Adult Large)   Pulse 83   Temp  "(!) 96.5  F (35.8  C) (Oral)   Resp 16   Ht 5' 4.5\" (1.638 m)   Wt 199 lb 2 oz (90.3 kg)   BMI 33.65 kg/m      GENERAL APPEARANCE:  Appearing stated age, smiling, alert, cooperative, and in no acute distress.   HEENT: Pupils equal, regular, react to light and accommodation. Extraocular muscles intact, fundi benign. Ear canals and tympanic membranes are normal. Lips, mouth, and throat are unremarkable.   NECK: Neck supple without adenopathy, thyromegaly or masses.   LYMPH: No anterior cervical or supraclavicular LN enlargement   PULMONARY: Normal respiratory effort. Chest is clear.   CARDIOVASCULAR: Heart auscultation: rhythm regular, heart sounds normal S1 and S2, bruit carotid artery absent.   SKIN: Warm and well perfused..   ABDOMEN: Abdomen soft, non-tender. BS normal. No masses or organomegaly.   MUSCULOSKELETAL: No obvious joint swelling, deformity or limitation in range of motion, full range of motion of the back and neck without pain, strength normal and symmetric in all muscle groups.   EXTREMITIES: Peripheral pulses are full. Extremities with no edema.  Monofilament exam is done and it is normal.   MENTAL STATUS: Alert, oriented and thought content appropriate   NEUROLOGIC: Station and gait normal, strength and movement normal, reflexes are normal and symmetric       "

## 2021-06-24 NOTE — TELEPHONE ENCOUNTER
Please call patient to schedule fasting diabetes check.  Patient was last seen 2/20/2018.     Please update PCP if patient is seeing another provider

## 2021-06-24 NOTE — TELEPHONE ENCOUNTER
Patient Returning Call  Reason for call:  Medication Refill  Information relayed to patient:  Yes, patient scheduled appointment for 3/01 @8:30   Patient has additional questions:  No  If YES, what are your questions/concerns:    Okay to leave a detailed message?: No call back needed

## 2021-06-24 NOTE — TELEPHONE ENCOUNTER
Refill Approved    Rx renewed per Medication Renewal Policy. Medication was last renewed on 3/24/18.    Narcisa Tyson, [facility] Connection Triage/Med Refill 3/10/2019     Requested Prescriptions   Pending Prescriptions Disp Refills     lisinopril (PRINIVIL,ZESTRIL) 5 MG tablet 90 tablet 3     Sig: Take 1 tablet (5 mg total) by mouth daily.    Ace Inhibitors Refill Protocol Passed - 3/8/2019  8:00 AM       Passed - PCP or prescribing provider visit in past 12 months      Last office visit with prescriber/PCP: 3/1/2019 Mana Lynch MD OR same dept: 3/1/2019 Mana Lynch MD OR same specialty: 3/1/2019 Mana Lynch MD  Last physical: Visit date not found Last MTM visit: Visit date not found   Next visit within 3 mo: Visit date not found  Next physical within 3 mo: Visit date not found  Prescriber OR PCP: Mana Lynch MD  Last diagnosis associated with med order: 1. HTN (hypertension)  - lisinopril (PRINIVIL,ZESTRIL) 5 MG tablet; Take 1 tablet (5 mg total) by mouth daily.  Dispense: 90 tablet; Refill: 3    If protocol passes may refill for 12 months if within 3 months of last provider visit (or a total of 15 months).            Passed - Serum Potassium in past 12 months    Lab Results   Component Value Date    Potassium 4.2 03/01/2019            Passed - Blood pressure filed in past 12 months    BP Readings from Last 1 Encounters:   03/01/19 139/73            Passed - Serum Creatinine in past 12 months    Creatinine   Date Value Ref Range Status   03/01/2019 0.93 0.60 - 1.10 mg/dL Final

## 2021-06-24 NOTE — TELEPHONE ENCOUNTER
Former patient of Eduardo & has not established care with another provider.  Please assign refill request to covering provider per Clinic standard process.      Refill Approved    Rx renewed per Medication Renewal Policy. Medication was last renewed on 5/23/18.    Afua Abraham, Care Connection Triage/Med Refill 2/14/2019     Requested Prescriptions   Pending Prescriptions Disp Refills     simvastatin (ZOCOR) 40 MG tablet 90 tablet 2     Sig: Take 1 tablet (40 mg total) by mouth daily.    Statins Refill Protocol (Hmg CoA Reductase Inhibitors) Passed - 2/13/2019  4:34 PM       Passed - PCP or prescribing provider visit in past 12 months     Last office visit with prescriber/PCP: 2/20/2018 Jennifer Clark MD OR same dept: 2/20/2018 Jennifer Clark MD OR same specialty: 2/20/2018 Jennifer Clark MD  Last physical: 11/14/2017 Last MTM visit: Visit date not found   Next visit within 3 mo: Visit date not found  Next physical within 3 mo: Visit date not found  Prescriber OR PCP: Jennifer Clark MD  Last diagnosis associated with med order: 1. Hyperlipidemia  - simvastatin (ZOCOR) 40 MG tablet; Take 1 tablet (40 mg total) by mouth daily.  Dispense: 90 tablet; Refill: 2    If protocol passes may refill for 12 months if within 3 months of last provider visit (or a total of 15 months).

## 2021-06-30 NOTE — PROGRESS NOTES
Progress Notes by Mana Lynch MD at 3/22/2021  9:10 AM     Author: Mana Lynch MD Service: -- Author Type: Physician    Filed: 3/22/2021  1:27 PM Encounter Date: 3/22/2021 Status: Signed    : Mana Lynch MD (Physician)       FEMALE PREVENTATIVE EXAM    Assessment and Plan:     1. Routine history and physical examination of adult  Lipid Cascade   2. Type 2 diabetes mellitus without complication, without long-term current use of insulin (H)  Glycosylated Hemoglobin A1c   3. Lichen sclerosus of female genitalia  clobetasoL (TEMOVATE) 0.05 % ointment    Ambulatory referral to Gynecology   4. Screening for viral disease  HIV Antigen/Antibody Diagnostic [place]   5. Hyperlipidemia, unspecified hyperlipidemia type       Medical decision making: Patient with diabetes type 2 which is now worsening due to lack of activity over the pandemic presents today for follow-up and a physical exam.  We reviewed health care maintenance.  Patient needs mammogram and DEXA scan that she will pursue after getting her COVID-19 vaccine.  Information about getting COVID-19 vaccine reviewed with the patient and printed information provided.    Regarding pursuing a Pap smear, patient declined as it is very painful for her.  I will be referring her to gynecology noting that she has lichen sclerosus.  Reviewed with patient that lichen sclerosus can cause squamous cell carcinoma in few cases at the vulva and this should be evaluated.  Discussed self cares.    Her diabetes has worsened.  We reviewed exercise with walking and monitoring blood sugars.  If no benefit is noted in the next couple of months, additional medication will be needed.  This is reviewed with the patient.  Patient has had success in losing weight and I congratulated patient on that.      Next follow up:  Return in about 3 months (around 6/22/2021) for Next scheduled follow up, Diabetes follow up.    Immunization Review  Adult Imm Review: Needs COVID-19  vaccine  BMI: 30.49      I discussed the following with the patient:   Adult Healthy Living: Importance of regular exercise  Healthy nutrition        Subjective:   Chief Complaint: Kathy Posada is an 65 y.o. female here for a preventative health visit.  {  HPI: Has Lichen Sclerosus and pap exam is painful. Wants to defer it.    1- Diabetes: There has been lack of exercise during this pandemic and patient has not been able to exercise    2-takes Lipitor and tolerates it well.  Denies any undue muscle aches    3-on lisinopril for microalbuminuria in the past with slightly elevated blood pressures which are now normotensive      Healthy Habits  Are you taking a daily aspirin? Yes  Do you typically exercising at least 40 min, 3-4 times per week?  NO  Do you usually eat at least 4 servings of fruit and vegetables a day, include whole grains and fiber and avoid regularly eating high fat foods? Yes  Have you had an eye exam in the past two years? Yes  Do you see a dentist twice per year? Yes  Do you have any concerns regarding sleep? No    Safety Screen  If you own firearms, are they secured in a locked gun cabinet or with trigger locks? Yes  Do you feel you are safe where you are living?: Yes (3/22/2021  9:00 AM)  Do you feel you are safe in your relationship(s)?: Yes (3/22/2021  9:00 AM)      Review of Systems:  Please see above.  The rest of the review of systems are negative for all systems.     Pap History:   Last 3 PAP results:  No results found for: PAP  Cancer Screening       Status Date      MAMMOGRAM Overdue 12/5/2019      Done 12/5/2017 MAMMO SCREENING BILATERAL     Patient has more history with this topic...    PAP SMEAR Next Due 6/13/2021      Done 6/13/2016 GYNECOLOGIC CYTOLOGY (PAP SMEAR)          Patient Care Team:  Mana Lynch MD as PCP - General (Family Medicine)  Mana Lynch MD as Assigned PCP        History     Reviewed By Date/Time Sections Reviewed    Mana Lynch MD 3/22/2021   "9:30 AM Medical, Surgical, Family    MarcoJennifer, STEPHANIE 3/22/2021  9:07 AM Tobacco            Objective:   Vital Signs:   Visit Vitals  /75 (Patient Site: Left Arm, Patient Position: Sitting, Cuff Size: Adult Large)   Pulse 95   Resp 16   Ht 5' 4.5\" (1.638 m)   Wt 180 lb 6.4 oz (81.8 kg)   BMI 30.49 kg/m           PHYSICAL EXAM  GENERAL: Healthy, alert and no distress  EYES: Eyes grossly normal to inspection. No discharge or erythema, or obvious scleral/conjunctival abnormalities.  NECK: no adenopathy, thyroid normal to palpation, trachea midline and normal to palpation and no carotid bruits  RESP: lungs clear to auscultation - no rales, rhonchi or wheezes  CV: regular rates and rhythm  NEURO: Cranial nerves grossly intact. Mentation and speech appropriate for age.  PSYCH: Mentation appears normal, affect normal/bright, judgement and insight intact, normal speech and appearance well-groomed    Patient is on statin  The 10-year ASCVD risk score ([place] DC Jr., et al., 2013) is: 11%    Values used to calculate the score:      Age: 65 years      Sex: Female      Is Non- : No      Diabetic: Yes      Tobacco smoker: No      Systolic Blood Pressure: 123 mmHg      Is BP treated: Yes      HDL Cholesterol: 56 mg/dL      Total Cholesterol: 181 mg/dL         Medication List          Accurate as of March 22, 2021  1:26 PM. If you have any questions, ask your nurse or doctor.            CONTINUE taking these medications    aspirin 81 mg chewable tablet  INSTRUCTIONS: Chew 81 mg daily.        atorvastatin 20 MG tablet  Also known as: LIPITOR  INSTRUCTIONS: Take 1 tablet (20 mg total) by mouth at bedtime.        clobetasoL 0.05 % ointment  Also known as: TEMOVATE  INSTRUCTIONS: APPLY TO AFFECTED SKIN AS INSTRUCTED        lisinopriL 5 MG tablet  Also known as: PRINIVIL,ZESTRIL  INSTRUCTIONS: Take 1 tablet (5 mg total) by mouth daily.        metFORMIN 500 MG tablet  Also known as: " Glucophage  INSTRUCTIONS: Take 1 tablet (500 mg total) by mouth 2 (two) times a day with meals. Increase to 2 tab two times a day in 2 weeks              Where to Get Your Medications      These medications were sent to [facility], [address], [facility] [address]    Phone: [phone]     clobetasoL 0.05 % ointment         Additional Screenings Completed Today:     .

## 2021-07-03 NOTE — ADDENDUM NOTE
Addendum Note by Jennifer Clark MD at 11/15/2017  2:33 PM     Author: Jennifer Clark MD Service: -- Author Type: Physician    Filed: 11/15/2017  2:33 PM Encounter Date: 11/14/2017 Status: Signed    : Jennifer Clark MD (Physician)    Addended by: JENNIFER CLARK on: 11/15/2017 02:33 PM        Modules accepted: Orders

## 2021-07-03 NOTE — ADDENDUM NOTE
Addendum Note by Ashley Fuentes RN at 1/31/2018  5:34 PM     Author: Ashley Fuentes RN Service: -- Author Type: Registered Nurse    Filed: 1/31/2018  5:34 PM Encounter Date: 1/31/2018 Status: Signed    : Ashley Fuentes RN (Registered Nurse)    Addended by: ASHLEY FUENTES on: 1/31/2018 05:34 PM        Modules accepted: Orders

## 2021-07-03 NOTE — ADDENDUM NOTE
Addendum Note by Wilma Hill FNP at 2/1/2018  7:59 AM     Author: Wilma Hill FNP Service: -- Author Type: Nurse Practitioner    Filed: 2/1/2018  7:59 AM Encounter Date: 1/31/2018 Status: Signed    : Wilma Hill FNP (Nurse Practitioner)    Addended by: WILMA HILL on: 2/1/2018 07:59 AM        Modules accepted: Orders

## 2021-07-21 ENCOUNTER — RECORDS - HEALTHEAST (OUTPATIENT)
Dept: ADMINISTRATIVE | Facility: CLINIC | Age: 66
End: 2021-07-21

## 2021-08-03 ENCOUNTER — OFFICE VISIT (OUTPATIENT)
Dept: FAMILY MEDICINE | Facility: CLINIC | Age: 66
End: 2021-08-03
Payer: COMMERCIAL

## 2021-08-03 VITALS
SYSTOLIC BLOOD PRESSURE: 125 MMHG | HEIGHT: 65 IN | BODY MASS INDEX: 30.32 KG/M2 | WEIGHT: 182 LBS | DIASTOLIC BLOOD PRESSURE: 73 MMHG | OXYGEN SATURATION: 98 % | HEART RATE: 99 BPM

## 2021-08-03 DIAGNOSIS — E78.5 HYPERLIPIDEMIA, UNSPECIFIED HYPERLIPIDEMIA TYPE: ICD-10-CM

## 2021-08-03 DIAGNOSIS — I10 ESSENTIAL HYPERTENSION: ICD-10-CM

## 2021-08-03 DIAGNOSIS — E11.9 TYPE 2 DIABETES MELLITUS WITHOUT COMPLICATION, WITHOUT LONG-TERM CURRENT USE OF INSULIN (H): Primary | ICD-10-CM

## 2021-08-03 DIAGNOSIS — N90.4 LICHEN SCLEROSUS OF FEMALE GENITALIA: ICD-10-CM

## 2021-08-03 LAB
CREAT UR-MCNC: 136 MG/DL
HBA1C MFR BLD: 7.4 % (ref 0–5.6)
MICROALBUMIN UR-MCNC: 1.17 MG/DL (ref 0–1.99)
MICROALBUMIN/CREAT UR: 8.6 MG/G CR

## 2021-08-03 PROCEDURE — 83036 HEMOGLOBIN GLYCOSYLATED A1C: CPT | Performed by: FAMILY MEDICINE

## 2021-08-03 PROCEDURE — 99214 OFFICE O/P EST MOD 30 MIN: CPT | Performed by: FAMILY MEDICINE

## 2021-08-03 PROCEDURE — 82043 UR ALBUMIN QUANTITATIVE: CPT | Performed by: FAMILY MEDICINE

## 2021-08-03 PROCEDURE — 36415 COLL VENOUS BLD VENIPUNCTURE: CPT | Performed by: FAMILY MEDICINE

## 2021-08-03 RX ORDER — ESTRADIOL 0.1 MG/G
CREAM VAGINAL
COMMUNITY
Start: 2021-04-05 | End: 2023-05-08

## 2021-08-03 RX ORDER — ATORVASTATIN CALCIUM 20 MG/1
40 TABLET, FILM COATED ORAL AT BEDTIME
Qty: 90 TABLET | Refills: 3 | Status: SHIPPED | OUTPATIENT
Start: 2021-08-03 | End: 2021-12-27

## 2021-08-03 RX ORDER — LISINOPRIL 5 MG/1
5 TABLET ORAL DAILY
Qty: 90 TABLET | Refills: 3 | Status: SHIPPED | OUTPATIENT
Start: 2021-08-03 | End: 2022-07-27

## 2021-08-03 RX ORDER — GLUCOSAMINE HCL/CHONDROITIN SU 500-400 MG
CAPSULE ORAL
Qty: 100 EACH | Refills: 3 | Status: SHIPPED | OUTPATIENT
Start: 2021-08-03 | End: 2023-08-07

## 2021-08-03 RX ORDER — LANCETS
EACH MISCELLANEOUS
Qty: 1 EACH | Refills: 3 | Status: SHIPPED | OUTPATIENT
Start: 2021-08-03 | End: 2023-05-08

## 2021-08-03 ASSESSMENT — MIFFLIN-ST. JEOR: SCORE: 1363.49

## 2021-08-03 NOTE — PROGRESS NOTES
"    Assessment & Plan     ICD-10-CM    1. Type 2 diabetes mellitus without complication, without long-term current use of insulin (H)  E11.9 Hemoglobin A1c     Albumin Random Urine Quantitative with Creat Ratio     Hemoglobin A1c     Albumin Random Urine Quantitative with Creat Ratio     blood glucose (NO BRAND SPECIFIED) test strip     blood glucose calibration (NO BRAND SPECIFIED) solution     thin (NO BRAND SPECIFIED) lancets     alcohol swab prep pads   2. Essential hypertension  I10 lisinopril (ZESTRIL) 5 MG tablet   3. Hyperlipidemia, unspecified hyperlipidemia type  E78.5 atorvastatin (LIPITOR) 20 MG tablet   4. Lichen sclerosus of female genitalia  N90.4      Medical decision making: Patient is here today for follow-up of her diabetes.  Her blood pressure and cholesterol are stable.  Her diabetes is stable but can benefit from further management.  Today discussed 3 option including referral to diabetes educator, close monitoring versus adding another medication.  Patient prefers to monitor at this time.  She is already seen GYN for lichen sclerosis and has been asked to use medication on a more regular manner and follow-up in 1 year which she agrees to.   :[id number]}     BMI:   Estimated body mass index is 30.76 kg/m  as calculated from the following:    Height as of this encounter: 1.638 m (5' 4.5\").    Weight as of this encounter: 82.6 kg (182 lb).   Weight management plan: Discussed healthy diet and exercise guidelines    MEDICATIONS:  Continue current medications without change  FUTURE APPOINTMENTS:       - Follow-up visit in 3 months  Work on weight loss    Return in about 3 months (around 11/3/2021).    Mana Lynch MD  [facility]    Subjective    Chief Complaint   Patient presents with     Medication Therapy Management     Refills needed, out of blood sugar supplies- if you want her to continue this please refill. Pt is fasting today, A1C done       Kathy is a 66 year " "old who presents for the following health issues     HPI   Patient is here today for follow-up of her diabetes.  She has been exercising more with swimming during the summer months.  She has run out of her diabetes testing supplies and has not been testing her blood sugars.  Continues to provide cares for her  who does have issues with ongoing alcohol consumption but otherwise stable.        Review of Systems   Constitutional, HEENT, cardiovascular, pulmonary, gi and gu systems are negative, except as otherwise noted.      Objective    /73 (BP Location: Left arm, Patient Position: Sitting, Cuff Size: Adult Large)   Pulse 99   Ht 1.638 m (5' 4.5\")   Wt 82.6 kg (182 lb)   SpO2 98%   BMI 30.76 kg/m    Body mass index is 30.76 kg/m .  Physical Exam   GENERAL: healthy, alert and no distress  RESP: lungs clear to auscultation - no rales, rhonchi or wheezes  CV: regular rate and rhythm, normal S1 S2, no S3 or S4, no murmur, click or rub, no peripheral edema and peripheral pulses strong  MS: no gross musculoskeletal defects noted, no edema  Diabetic foot exam: normal DP and PT pulses, no trophic changes or ulcerative lesions and normal sensory exam    Recent Results (from the past 168 hour(s))   Hemoglobin A1c    Collection Time: 08/03/21  9:52 AM   Result Value Ref Range    Hemoglobin A1C 7.4 (H) 0.0 - 5.6 %   Albumin Random Urine Quantitative with Creat Ratio    Collection Time: 08/03/21  9:52 AM   Result Value Ref Range    Microalbumin Urine mg/dL 1.17 0.00 - 1.99 mg/dL    Creatinine Urine mg/dL 136 mg/dL    Microalbumin Urine mg/g Cr 8.6 <=19.9 mg/g Cr               "

## 2021-08-03 NOTE — PATIENT INSTRUCTIONS
Patient Education     Long-Term Complications of Diabetes  Diabetes can cause health problems over time. These are called complications. They are more likely to happen if your blood sugar is often too high. Over time, high blood sugar can damage blood vessels in your body. It's important to keep your blood sugar in your target range. This can help prevent or delay complications from diabetes.    Possible complications  Diabetes can cause:    Eye problems, such as damage to the blood vessels in the eyes, pressure in the eye, and clouding of the eye s lens. In time, eye problems can lead to bleeding and blindness.     Tooth and gum problems. These can cause loss of teeth and bone.    Blood vessel disease, leading to circulation problems, heart attack, or stroke. Or you may need a limb removed because of poor blood flow to the skin.    Problems with sexual function. Men may have erectile dysfunction. Women may have sexual discomfort.     Kidney disease, leading to kidney failure. You may need dialysis or a kidney transplant.     Nerve problems, causing pain or loss of feeling in your feet and other parts of your body. This may lead to loss of a limb.     High blood pressure. This health problem puts strain on your heart and blood vessels.    Serious infections. These can possibly lead to loss of toes, feet, or limbs.  How to prevent complications  You can prevent these serious problems by managing your blood sugar, blood pressure, and cholesterol. This can help you feel better and stay healthy. You can control diabetes by tracking your blood sugar. You can also eat healthy and exercise to prevent weight gain. If you smoke, stop all tobacco products. It will greatly improve your overall health. And take medicine if directed by your healthcare provider.  [employer] last reviewed this educational content on 11/1/2019 2000-2021 [employer]. All rights reserved. This information is not intended as a  substitute for professional medical care. Always follow your healthcare professional's instructions.

## 2021-10-11 ENCOUNTER — HEALTH MAINTENANCE LETTER (OUTPATIENT)
Age: 66
End: 2021-10-11

## 2021-10-20 ENCOUNTER — TRANSFERRED RECORDS (OUTPATIENT)
Dept: HEALTH INFORMATION MANAGEMENT | Facility: CLINIC | Age: 66
End: 2021-10-20
Payer: COMMERCIAL

## 2021-10-20 LAB — RETINOPATHY: NEGATIVE

## 2021-12-01 ENCOUNTER — TRANSFERRED RECORDS (OUTPATIENT)
Dept: HEALTH INFORMATION MANAGEMENT | Facility: CLINIC | Age: 66
End: 2021-12-01
Payer: COMMERCIAL

## 2021-12-01 LAB — RETINOPATHY: NORMAL

## 2021-12-16 ENCOUNTER — IMMUNIZATION (OUTPATIENT)
Dept: NURSING | Facility: CLINIC | Age: 66
End: 2021-12-16
Payer: COMMERCIAL

## 2021-12-16 PROCEDURE — 0004A PR COVID VAC PFIZER DIL RECON 30 MCG/0.3 ML IM: CPT

## 2021-12-16 PROCEDURE — 91300 PR COVID VAC PFIZER DIL RECON 30 MCG/0.3 ML IM: CPT

## 2021-12-27 ENCOUNTER — TELEPHONE (OUTPATIENT)
Dept: FAMILY MEDICINE | Facility: CLINIC | Age: 66
End: 2021-12-27
Payer: COMMERCIAL

## 2021-12-27 DIAGNOSIS — E78.5 HYPERLIPIDEMIA, UNSPECIFIED HYPERLIPIDEMIA TYPE: ICD-10-CM

## 2021-12-27 RX ORDER — ATORVASTATIN CALCIUM 40 MG/1
40 TABLET, FILM COATED ORAL AT BEDTIME
Qty: 90 TABLET | Refills: 3 | Status: SHIPPED | OUTPATIENT
Start: 2021-12-27 | End: 2022-12-21

## 2021-12-28 NOTE — TELEPHONE ENCOUNTER
Fax from pharmacy requesting a change on Atorvastatin to a 40 mg tablet.   Current Rx is 20 mg tablet take 2 tablets daily.   Insurance will pay max 1.5 tablets per day.

## 2022-02-09 ENCOUNTER — MYC REFILL (OUTPATIENT)
Dept: FAMILY MEDICINE | Facility: CLINIC | Age: 67
End: 2022-02-09
Payer: COMMERCIAL

## 2022-02-09 DIAGNOSIS — E11.9 TYPE 2 DIABETES MELLITUS WITHOUT COMPLICATION, WITHOUT LONG-TERM CURRENT USE OF INSULIN (H): ICD-10-CM

## 2022-03-22 ENCOUNTER — OFFICE VISIT (OUTPATIENT)
Dept: FAMILY MEDICINE | Facility: CLINIC | Age: 67
End: 2022-03-22
Payer: COMMERCIAL

## 2022-03-22 VITALS
OXYGEN SATURATION: 96 % | BODY MASS INDEX: 29.99 KG/M2 | WEIGHT: 180 LBS | SYSTOLIC BLOOD PRESSURE: 137 MMHG | HEART RATE: 89 BPM | DIASTOLIC BLOOD PRESSURE: 80 MMHG | HEIGHT: 65 IN

## 2022-03-22 DIAGNOSIS — Z12.31 ENCOUNTER FOR SCREENING MAMMOGRAM FOR BREAST CANCER: ICD-10-CM

## 2022-03-22 DIAGNOSIS — Z12.31 VISIT FOR SCREENING MAMMOGRAM: ICD-10-CM

## 2022-03-22 DIAGNOSIS — E11.65 CONTROLLED TYPE 2 DIABETES MELLITUS WITH HYPERGLYCEMIA, WITHOUT LONG-TERM CURRENT USE OF INSULIN (H): Primary | ICD-10-CM

## 2022-03-22 DIAGNOSIS — I10 ESSENTIAL HYPERTENSION: ICD-10-CM

## 2022-03-22 DIAGNOSIS — Z13.220 SCREENING FOR HYPERLIPIDEMIA: ICD-10-CM

## 2022-03-22 LAB
ALBUMIN SERPL-MCNC: 3.9 G/DL (ref 3.5–5)
ALP SERPL-CCNC: 83 U/L (ref 45–120)
ALT SERPL W P-5'-P-CCNC: 36 U/L (ref 0–45)
ANION GAP SERPL CALCULATED.3IONS-SCNC: 14 MMOL/L (ref 5–18)
AST SERPL W P-5'-P-CCNC: 27 U/L (ref 0–40)
BILIRUB SERPL-MCNC: 0.9 MG/DL (ref 0–1)
BUN SERPL-MCNC: 12 MG/DL (ref 8–22)
CALCIUM SERPL-MCNC: 9.7 MG/DL (ref 8.5–10.5)
CHLORIDE BLD-SCNC: 105 MMOL/L (ref 98–107)
CHOLEST SERPL-MCNC: 174 MG/DL
CO2 SERPL-SCNC: 23 MMOL/L (ref 22–31)
CREAT SERPL-MCNC: 0.87 MG/DL (ref 0.6–1.1)
FASTING STATUS PATIENT QL REPORTED: YES
GFR SERPL CREATININE-BSD FRML MDRD: 74 ML/MIN/1.73M2
GLUCOSE BLD-MCNC: 133 MG/DL (ref 70–125)
HBA1C MFR BLD: 7.7 % (ref 0–5.6)
HDLC SERPL-MCNC: 53 MG/DL
LDLC SERPL CALC-MCNC: 95 MG/DL
POTASSIUM BLD-SCNC: 4 MMOL/L (ref 3.5–5)
PROT SERPL-MCNC: 7 G/DL (ref 6–8)
SODIUM SERPL-SCNC: 142 MMOL/L (ref 136–145)
TRIGL SERPL-MCNC: 132 MG/DL

## 2022-03-22 PROCEDURE — 80053 COMPREHEN METABOLIC PANEL: CPT | Performed by: FAMILY MEDICINE

## 2022-03-22 PROCEDURE — 99214 OFFICE O/P EST MOD 30 MIN: CPT | Performed by: FAMILY MEDICINE

## 2022-03-22 PROCEDURE — 83036 HEMOGLOBIN GLYCOSYLATED A1C: CPT | Performed by: FAMILY MEDICINE

## 2022-03-22 PROCEDURE — 80061 LIPID PANEL: CPT | Performed by: FAMILY MEDICINE

## 2022-03-22 PROCEDURE — 36415 COLL VENOUS BLD VENIPUNCTURE: CPT | Performed by: FAMILY MEDICINE

## 2022-03-22 NOTE — PROGRESS NOTES
"  Assessment & Plan     ICD-10-CM    1. Controlled type 2 diabetes mellitus with hyperglycemia, without long-term current use of insulin (H)  E11.65 HEMOGLOBIN A1C     HEMOGLOBIN A1C     Comprehensive metabolic panel (BMP + Alb, Alk Phos, ALT, AST, Total. Bili, TP)     Comprehensive metabolic panel (BMP + Alb, Alk Phos, ALT, AST, Total. Bili, TP)   2. Visit for screening mammogram  Z12.31 MA SCREENING DIGITAL BILAT - Future  (s+30)   3. Encounter for screening mammogram for breast cancer  Z12.31    4. Screening for hyperlipidemia  Z13.220 Lipid panel reflex to direct LDL Fasting     Lipid panel reflex to direct LDL Fasting   5. Essential hypertension  I10 Comprehensive metabolic panel (BMP + Alb, Alk Phos, ALT, AST, Total. Bili, TP)     Comprehensive metabolic panel (BMP + Alb, Alk Phos, ALT, AST, Total. Bili, TP)     Medical decision making: Patient with diabetes, hypertension and hyperlipidemia presents today for follow-up.  Her diabetes has slightly worsened..  She does have obesity 2.  Today we discussed about adding another agent.  Patient is wondering what can be done before she chooses to be on additional medication.  I have made a referral to diabetes educator for dietary modification and checking blood sugars more often to see how she can keep it under control.  Blood pressure is stable and continue current medication.  Hyperlipidemia is under suboptimal control with LDL not less than 70.  She is on Lipitor 40 mg.  Will consider increasing it if no benefit noted in optimal control of diabetes.  Health maintenance lab as above.    BMI:   Estimated body mass index is 30.42 kg/m  as calculated from the following:    Height as of this encounter: 1.638 m (5' 4.5\").    Weight as of this encounter: 81.6 kg (180 lb).         Return in about 6 months (around 9/22/2022) for Routine preventive.    Mana Lynch MD  [facility]    Subjective    Chief Complaint   Patient presents with "     Diabetes     Pt is fasting today, no questions or concerns        Kathy is a 66 year old who presents for the following health issues     History of Present Illness       Diabetes:   She presents for follow up of diabetes.  She is checking home blood glucose a few times a week. She checks blood glucose before meals.  Blood glucose is never over 200 and never under 70. When her blood glucose is low, the patient is asymptomatic for confusion, blurred vision, lethargy and reports not feeling dizzy, shaky, or weak.  She has no concerns regarding her diabetes at this time.  She is not experiencing numbness or burning in feet, excessive thirst, blurry vision, weight changes or redness, sores or blisters on feet. The patient has had a diabetic eye exam in the last 12 months. Eye exam performed on 12/21. Location of last eye exam [facility].        She eats 2-3 servings of fruits and vegetables daily.She consumes 0 sweetened beverage(s) daily.She exercises with enough effort to increase her heart rate 10 to 19 minutes per day.  She exercises with enough effort to increase her heart rate 3 or less days per week.   She is taking medications regularly.       Patient Active Problem List   Diagnosis     Hyperlipidemia     Diabetes type 2, controlled (H)     Obesity (BMI 30-39.9)     Lichen sclerosus of female genitalia     Current Outpatient Medications   Medication     alcohol swab prep pads     aspirin 81 mg chewable tablet     atorvastatin (LIPITOR) 40 MG tablet     blood glucose (NO BRAND SPECIFIED) test strip     blood glucose calibration (NO BRAND SPECIFIED) solution     clobetasoL (TEMOVATE) 0.05 % ointment     estradiol (ESTRACE) 0.1 MG/GM vaginal cream     lisinopril (ZESTRIL) 5 MG tablet     metFORMIN (GLUCOPHAGE) 500 MG tablet     thin (NO BRAND SPECIFIED) lancets     No current facility-administered medications for this visit.         Review of Systems   Constitutional, HEENT, cardiovascular, pulmonary, gi and  "gu systems are negative, except as otherwise noted.      Objective    /80 (BP Location: Left arm, Patient Position: Sitting, Cuff Size: Adult Large)   Pulse 89   Ht 1.638 m (5' 4.5\")   Wt 81.6 kg (180 lb)   SpO2 96%   BMI 30.42 kg/m    Body mass index is 30.42 kg/m .  Physical Exam   GENERAL: healthy, alert and no distress  NECK: no adenopathy, no asymmetry, masses, or scars and thyroid normal to palpation  RESP: lungs clear to auscultation - no rales, rhonchi or wheezes  CV: regular rate and rhythm, normal S1 S2, no S3 or S4, no murmur, click or rub, no peripheral edema and peripheral pulses strong  ABDOMEN: soft, nontender, no hepatosplenomegaly, no masses and bowel sounds normal  MS: no gross musculoskeletal defects noted, no edema    Results for orders placed or performed in visit on 03/22/22   HEMOGLOBIN A1C     Status: Abnormal   Result Value Ref Range    Hemoglobin A1C 7.7 (H) 0.0 - 5.6 %   Lipid panel reflex to direct LDL Fasting     Status: None   Result Value Ref Range    Cholesterol 174 <=199 mg/dL    Triglycerides 132 <=149 mg/dL    Direct Measure HDL 53 >=50 mg/dL    LDL Cholesterol Calculated 95 <=129 mg/dL    Patient Fasting > 8hrs? Yes    Comprehensive metabolic panel (BMP + Alb, Alk Phos, ALT, AST, Total. Bili, TP)     Status: Abnormal   Result Value Ref Range    Sodium 142 136 - 145 mmol/L    Potassium 4.0 3.5 - 5.0 mmol/L    Chloride 105 98 - 107 mmol/L    Carbon Dioxide (CO2) 23 22 - 31 mmol/L    Anion Gap 14 5 - 18 mmol/L    Urea Nitrogen 12 8 - 22 mg/dL    Creatinine 0.87 0.60 - 1.10 mg/dL    Calcium 9.7 8.5 - 10.5 mg/dL    Glucose 133 (H) 70 - 125 mg/dL    Alkaline Phosphatase 83 45 - 120 U/L    AST 27 0 - 40 U/L    ALT 36 0 - 45 U/L    Protein Total 7.0 6.0 - 8.0 g/dL    Albumin 3.9 3.5 - 5.0 g/dL    Bilirubin Total 0.9 0.0 - 1.0 mg/dL    GFR Estimate 74 >60 mL/min/1.73m2               "

## 2022-03-22 NOTE — PATIENT INSTRUCTIONS
Patient Education     Diabetes: Caring for Your Body  When you have diabetes, your body needs special care. This care helps you stay healthy and prevent problems. Exercise and healthy eating are a part of this. You can also protect yourself by taking special care of your feet, skin, and teeth.  Caring for your feet    Follow these tips to help keep your feet healthy:    Check your feet every day for redness, blisters, cracks, dry skin, or numbness. Use a mirror to check the bottoms of your feet, if needed. Or ask for help.    Wash your feet in warm (not hot) water. Don t soak them.    Use an emery board to keep your toenails even with the ends of your toes. File away sharp edges. A foot doctor (podiatrist) may need to cut your toenails for you.    Smooth down calluses gently or wait until you next podiatry appointment.    Keep your skin soft and smooth by putting a thin layer of skin lotion on the tops and bottoms of your feet. Don't put lotion in between your toes.    Always wear shoes or slippers, even inside your home. Make sure that shoes are correctly fitted. Change your socks daily. Always check shoes for foreign objects before putting them on.    Call your healthcare provider right away if your feet are numb or painful. Also call your provider if a cut or sore doesn t heal in a few days.  Preventing skin infections  To prevent skin infections, bathe every day. Dry yourself well, especially between your toes. Wash any cuts with warm, soapy water. Cover with a sterile bandage. Call your healthcare provider if a cut or sore doesn't heal in a few days, feels warm, itches, is swollen, or has a bad smell.  Caring for your teeth  Follow these guidelines for healthy teeth:    Brush your teeth twice daily.    Floss your teeth daily.    See your dentist at least twice yearly.    Keep your blood sugar in a good range.  If you smoke, quit  Smoking is dangerous for everyone, especially people with diabetes. It can harm  the blood vessels in your eyes, kidneys, nerves, and heart. It raises blood pressure. Smoking can also slow healing, so infections are more likely. Ask your healthcare provider about programs to help you stop smoking.  [employer] last reviewed this educational content on 4/1/2019 2000-2021 [employer]. All rights reserved. This information is not intended as a substitute for professional medical care. Always follow your healthcare professional's instructions.

## 2022-04-05 DIAGNOSIS — E11.9 TYPE 2 DIABETES MELLITUS WITHOUT COMPLICATION, WITHOUT LONG-TERM CURRENT USE OF INSULIN (H): ICD-10-CM

## 2022-04-06 NOTE — TELEPHONE ENCOUNTER
"Routing refill request to provider for review/approval because:  Early refill request.     Last Written Prescription Date:  2/11/2022  Last Fill Quantity: 120,  # refills: 1   Last office visit provider:  3/22/2022     Requested Prescriptions   Pending Prescriptions Disp Refills     metFORMIN (GLUCOPHAGE) 500 MG tablet 120 tablet 1     Sig: Take 1 tablet (500 mg) by mouth 2 times daily (with meals)       Biguanide Agents Passed - 4/6/2022  1:46 PM        Passed - Patient is age 10 or older        Passed - Patient has documented A1c within the specified period of time.     If HgbA1C is 8 or greater, it needs to be on file within the past 3 months.  If less than 8, must be on file within the past 6 months.     Recent Labs   Lab Test 03/22/22  0947   A1C 7.7*             Passed - Patient's CR is NOT>1.4 OR Patient's EGFR is NOT<45 within past 12 mos.     Recent Labs   Lab Test 03/22/22  0947 08/27/20  1009   GFRESTIMATED 74 >60   GFRESTBLACK  --  >60       Recent Labs   Lab Test 03/22/22  0947   CR 0.87             Passed - Patient does NOT have a diagnosis of CHF.        Passed - Medication is active on med list        Passed - Patient is not pregnant        Passed - Patient has not had a positive pregnancy test within the past 12 mos.         Passed - Recent (6 mo) or future (30 days) visit within the authorizing provider's specialty     Patient had office visit in the last 6 months or has a visit in the next 30 days with authorizing provider or within the authorizing provider's specialty.  See \"Patient Info\" tab in inbasket, or \"Choose Columns\" in Meds & Orders section of the refill encounter.                 Muriel Kauffman RN 04/06/22 1:46 PM  "

## 2022-04-26 ENCOUNTER — ANCILLARY PROCEDURE (OUTPATIENT)
Dept: MAMMOGRAPHY | Facility: CLINIC | Age: 67
End: 2022-04-26
Attending: FAMILY MEDICINE
Payer: COMMERCIAL

## 2022-04-26 DIAGNOSIS — Z12.31 VISIT FOR SCREENING MAMMOGRAM: ICD-10-CM

## 2022-04-26 PROCEDURE — 77067 SCR MAMMO BI INCL CAD: CPT

## 2022-05-02 ENCOUNTER — MYC MEDICAL ADVICE (OUTPATIENT)
Dept: FAMILY MEDICINE | Facility: CLINIC | Age: 67
End: 2022-05-02
Payer: COMMERCIAL

## 2022-05-02 DIAGNOSIS — E11.9 TYPE 2 DIABETES MELLITUS WITHOUT COMPLICATION, WITHOUT LONG-TERM CURRENT USE OF INSULIN (H): ICD-10-CM

## 2022-06-27 DIAGNOSIS — E11.9 TYPE 2 DIABETES MELLITUS WITHOUT COMPLICATION, WITHOUT LONG-TERM CURRENT USE OF INSULIN (H): ICD-10-CM

## 2022-06-28 NOTE — TELEPHONE ENCOUNTER
"Last Written Prescription Date:  5/2/22  Last Fill Quantity: 120,  # refills: 1   Last office visit provider:  3/22/22     Requested Prescriptions   Pending Prescriptions Disp Refills     metFORMIN (GLUCOPHAGE) 500 MG tablet 120 tablet 1     Sig: Take 2 tablets (1,000 mg) by mouth 2 times daily (with meals)       Biguanide Agents Passed - 6/27/2022  4:27 PM        Passed - Patient is age 10 or older        Passed - Patient has documented A1c within the specified period of time.     If HgbA1C is 8 or greater, it needs to be on file within the past 3 months.  If less than 8, must be on file within the past 6 months.     Recent Labs   Lab Test 03/22/22  0947   A1C 7.7*             Passed - Patient's CR is NOT>1.4 OR Patient's EGFR is NOT<45 within past 12 mos.     Recent Labs   Lab Test 03/22/22  0947 08/27/20  1009   GFRESTIMATED 74 >60   GFRESTBLACK  --  >60       Recent Labs   Lab Test 03/22/22  0947   CR 0.87             Passed - Patient does NOT have a diagnosis of CHF.        Passed - Medication is active on med list        Passed - Patient is not pregnant        Passed - Patient has not had a positive pregnancy test within the past 12 mos.         Passed - Recent (6 mo) or future (30 days) visit within the authorizing provider's specialty     Patient had office visit in the last 6 months or has a visit in the next 30 days with authorizing provider or within the authorizing provider's specialty.  See \"Patient Info\" tab in inbasket, or \"Choose Columns\" in Meds & Orders section of the refill encounter.                 Afua Abarham RN 06/28/22 6:55 PM  "

## 2022-07-27 DIAGNOSIS — I10 ESSENTIAL HYPERTENSION: ICD-10-CM

## 2022-07-27 RX ORDER — LISINOPRIL 5 MG/1
5 TABLET ORAL DAILY
Qty: 90 TABLET | Refills: 1 | Status: SHIPPED | OUTPATIENT
Start: 2022-07-27 | End: 2023-02-06

## 2022-07-27 NOTE — TELEPHONE ENCOUNTER
"Last Written Prescription Date:  8/3/21  Last Fill Quantity: 90,  # refills: 3   Last office visit provider:  3/22/22     Requested Prescriptions   Pending Prescriptions Disp Refills     lisinopril (ZESTRIL) 5 MG tablet 90 tablet 3     Sig: Take 1 tablet (5 mg) by mouth daily       ACE Inhibitors (Including Combos) Protocol Passed - 7/27/2022 11:10 AM        Passed - Blood pressure under 140/90 in past 12 months     BP Readings from Last 3 Encounters:   03/22/22 137/80   08/03/21 125/73   03/22/21 123/75                 Passed - Recent (12 mo) or future (30 days) visit within the authorizing provider's specialty     Patient has had an office visit with the authorizing provider or a provider within the authorizing providers department within the previous 12 mos or has a future within next 30 days. See \"Patient Info\" tab in inbasket, or \"Choose Columns\" in Meds & Orders section of the refill encounter.              Passed - Medication is active on med list        Passed - Patient is age 18 or older        Passed - No active pregnancy on record        Passed - Normal serum creatinine on file in past 12 months     Recent Labs   Lab Test 03/22/22  0947   CR 0.87       Ok to refill medication if creatinine is low          Passed - Normal serum potassium on file in past 12 months     Recent Labs   Lab Test 03/22/22  0947   POTASSIUM 4.0             Passed - No positive pregnancy test within past 12 months             Afua Abraham RN 07/27/22 5:54 PM  "

## 2022-08-22 DIAGNOSIS — E11.9 TYPE 2 DIABETES MELLITUS WITHOUT COMPLICATION, WITHOUT LONG-TERM CURRENT USE OF INSULIN (H): ICD-10-CM

## 2022-08-22 NOTE — TELEPHONE ENCOUNTER
Pending Prescriptions:                       Disp   Refills    metFORMIN (GLUCOPHAGE) 500 MG tablet      120 ta*3            Sig: Take 2 tablets (1,000 mg) by mouth 2 times daily           (with meals)

## 2022-09-22 ENCOUNTER — TRANSFERRED RECORDS (OUTPATIENT)
Dept: HEALTH INFORMATION MANAGEMENT | Facility: CLINIC | Age: 67
End: 2022-09-22

## 2022-09-22 LAB — RETINOPATHY: NEGATIVE

## 2022-09-25 ENCOUNTER — HEALTH MAINTENANCE LETTER (OUTPATIENT)
Age: 67
End: 2022-09-25

## 2022-09-27 ENCOUNTER — OFFICE VISIT (OUTPATIENT)
Dept: FAMILY MEDICINE | Facility: CLINIC | Age: 67
End: 2022-09-27
Payer: COMMERCIAL

## 2022-09-27 VITALS
WEIGHT: 178.6 LBS | DIASTOLIC BLOOD PRESSURE: 83 MMHG | HEIGHT: 65 IN | TEMPERATURE: 97.5 F | RESPIRATION RATE: 16 BRPM | BODY MASS INDEX: 29.76 KG/M2 | SYSTOLIC BLOOD PRESSURE: 139 MMHG | HEART RATE: 85 BPM

## 2022-09-27 DIAGNOSIS — H69.92 DYSFUNCTION OF LEFT EUSTACHIAN TUBE: Primary | ICD-10-CM

## 2022-09-27 DIAGNOSIS — Z23 NEED FOR PROPHYLACTIC VACCINATION AND INOCULATION AGAINST INFLUENZA: ICD-10-CM

## 2022-09-27 PROCEDURE — 90662 IIV NO PRSV INCREASED AG IM: CPT | Performed by: FAMILY MEDICINE

## 2022-09-27 PROCEDURE — G0009 ADMIN PNEUMOCOCCAL VACCINE: HCPCS | Performed by: FAMILY MEDICINE

## 2022-09-27 PROCEDURE — G0008 ADMIN INFLUENZA VIRUS VAC: HCPCS | Performed by: FAMILY MEDICINE

## 2022-09-27 PROCEDURE — 99213 OFFICE O/P EST LOW 20 MIN: CPT | Mod: 25 | Performed by: FAMILY MEDICINE

## 2022-09-27 PROCEDURE — 90677 PCV20 VACCINE IM: CPT | Performed by: FAMILY MEDICINE

## 2022-09-27 NOTE — PROGRESS NOTES
Assessment/ Plan     1. Dysfunction of left eustachian tube  Kathy presents with a several week history of signs and symptoms consistent with eustachian tube dysfunction.  This is worse on the left.  She has no signs of infection.  Would recommend that she start Flonase nasal spray and also an antihistamine as she has some signs and symptoms of allergies.  She will be seeing her PCP in a couple weeks for diabetic check and they can recheck on symptoms at that time.    2. Need for prophylactic vaccination and inoculation against influenza  We will update her flu and Prevnar vaccines today.  She will get her tetanus booster when she sees her primary.  - INFLUENZA, QUAD, HIGH DOSE, PF, 65YR + (FLUZONE HD)      Subjective:      Kathy Posada is a 67 year old female who presents for evaluation of ear discomfort.  She states symptoms have been going on for at least several weeks.  She has somewhat of an achy sensation mainly in her left ear.  It will feel plugged and sometimes have some ringing.  Sometimes she will hear a crackling sensation like it is going to pop but then it does not.  She does swim regularly in a pool.  She has been waking up with a little bit of a hoarse voice and some congestion.  She is never had allergies in the past, but she is wondering if maybe she developed some.  [place] cabin there is some property next to hers that is been doing some harvesting of hay and she feels like her symptoms are worse when she is there.  She has not noticed any drainage.  She is not had a fever.  She has not had recent cold or viral symptoms.  She will be seeing her primary care physician in a couple weeks for diabetic check.    Relevant past medical, family, surgical, and social history reviewed with patient, unless noted in HPI, not pertinent for this visit.  Medications were discussed and reconciled.   Review of Systems   A 12 point comprehensive review of systems was negative except as noted.      Current  "Outpatient Medications   Medication Sig Dispense Refill     alcohol swab prep pads Use to swab area of injection/sixto as directed 100 each 3     aspirin 81 mg chewable tablet [ASPIRIN 81 MG CHEWABLE TABLET] Chew 81 mg daily.       atorvastatin (LIPITOR) 40 MG tablet Take 1 tablet (40 mg) by mouth At Bedtime 90 tablet 3     blood glucose (NO BRAND SPECIFIED) test strip Use to test blood sugar 1 times daily or as directed. To accompany: Blood Glucose Monitor Brands: per insurance. 100 strip 6     blood glucose calibration (NO BRAND SPECIFIED) solution Use to calibrate blood glucose monitor as needed as directed. To accompany: Blood Glucose Monitor Brands: per insurance. 11 each 1     clobetasoL (TEMOVATE) 0.05 % ointment [CLOBETASOL (TEMOVATE) 0.05 % OINTMENT] APPLY TO AFFECTED SKIN AS INSTRUCTED 60 g 1     estradiol (ESTRACE) 0.1 MG/GM vaginal cream INSERT 1 G TWICE A WEEK BY VAGINAL ROUTE.       lisinopril (ZESTRIL) 5 MG tablet Take 1 tablet (5 mg) by mouth daily 90 tablet 1     metFORMIN (GLUCOPHAGE) 500 MG tablet Take 2 tablets (1,000 mg) by mouth 2 times daily (with meals) 120 tablet 3     thin (NO BRAND SPECIFIED) lancets Use to test blood sugar 1 times daily or as directed. To accompany: Blood Glucose Monitor Brands: per insurance. 1 each 3         Objective:     /83   Pulse 85   Temp 97.5  F (36.4  C)   Resp 16   Ht 1.638 m (5' 4.5\")   Wt 81 kg (178 lb 9.6 oz)   LMP  (LMP Unknown)   BMI 30.18 kg/m      Body mass index is 30.18 kg/m .       General appearance: alert, appears stated age and cooperative  Head: Normocephalic, without obvious abnormality, atraumatic  Eyes: conjunctivae/corneas clear.   Ears: normal TM's and external ear canals both ears, minimal fluid behind the left TM.  Nose: Nares normal. Septum midline. Mucosa normal. No drainage or sinus tenderness.  Throat: lips, mucosa, and tongue normal; teeth and gums normal  Neck: no adenopathy  Lungs: clear to auscultation " bilaterally  Heart: regular rate and rhythm, S1, S2 normal, no murmur, click, rub or gallop      No results found for this or any previous visit (from the past 168 hour(s)).       This note has been dictated using voice recognition software. Any grammatical or context distortions are unintentional and inherent to the software  Answers for HPI/ROS submitted by the patient on 9/26/2022  How many servings of fruits and vegetables do you eat daily?: 2-3  On average, how many sweetened beverages do you drink each day (Examples: soda, juice, sweet tea, etc.  Do NOT count diet or artificially sweetened beverages)?: 0  How many minutes a day do you exercise enough to make your heart beat faster?: 9 or less  How many days a week do you exercise enough to make your heart beat faster?: 3 or less  How many days per week do you miss taking your medication?: 0  What is the reason for your visit today?: ear ache, congestion  When did your symptoms begin?: 1-2 weeks ago  What are your symptoms?: ringing in my ear , blocked  How would you describe these symptoms?: Moderate  Are your symptoms:: Staying the same  Have you had these symptoms before?: No  Is there anything that makes you feel worse?: no  Is there anything that makes you feel better?: no

## 2022-10-03 ENCOUNTER — MYC MEDICAL ADVICE (OUTPATIENT)
Dept: FAMILY MEDICINE | Facility: CLINIC | Age: 67
End: 2022-10-03

## 2022-10-03 DIAGNOSIS — E11.9 TYPE 2 DIABETES MELLITUS WITHOUT COMPLICATION, WITHOUT LONG-TERM CURRENT USE OF INSULIN (H): ICD-10-CM

## 2022-10-04 NOTE — TELEPHONE ENCOUNTER
"Routing refill request to provider for review/approval because:  Labs not current:  A1C    Last Written Prescription Date:  6/28/22  Last Fill Quantity: 120,  # refills: 3   Last office visit provider:  9/27/22     Requested Prescriptions   Pending Prescriptions Disp Refills     metFORMIN (GLUCOPHAGE) 500 MG tablet 120 tablet 3     Sig: Take 2 tablets (1,000 mg) by mouth 2 times daily (with meals)       Biguanide Agents Failed - 10/4/2022  8:14 AM        Failed - Patient has documented A1c within the specified period of time.     If HgbA1C is 8 or greater, it needs to be on file within the past 3 months.  If less than 8, must be on file within the past 6 months.     Recent Labs   Lab Test 03/22/22  0947   A1C 7.7*             Passed - Patient is age 10 or older        Passed - Patient's CR is NOT>1.4 OR Patient's EGFR is NOT<45 within past 12 mos.     Recent Labs   Lab Test 03/22/22  0947 08/27/20  1009   GFRESTIMATED 74 >60   GFRESTBLACK  --  >60       Recent Labs   Lab Test 03/22/22  0947   CR 0.87             Passed - Patient does NOT have a diagnosis of CHF.        Passed - Medication is active on med list        Passed - Patient is not pregnant        Passed - Patient has not had a positive pregnancy test within the past 12 mos.         Passed - Recent (6 mo) or future (30 days) visit within the authorizing provider's specialty     Patient had office visit in the last 6 months or has a visit in the next 30 days with authorizing provider or within the authorizing provider's specialty.  See \"Patient Info\" tab in inbasket, or \"Choose Columns\" in Meds & Orders section of the refill encounter.                 Fede Ray RN 10/04/22 8:15 AM  "

## 2022-11-03 ENCOUNTER — OFFICE VISIT (OUTPATIENT)
Dept: FAMILY MEDICINE | Facility: CLINIC | Age: 67
End: 2022-11-03
Payer: COMMERCIAL

## 2022-11-03 VITALS
BODY MASS INDEX: 29.99 KG/M2 | HEIGHT: 65 IN | OXYGEN SATURATION: 98 % | SYSTOLIC BLOOD PRESSURE: 129 MMHG | WEIGHT: 180 LBS | DIASTOLIC BLOOD PRESSURE: 77 MMHG | HEART RATE: 86 BPM

## 2022-11-03 DIAGNOSIS — N90.4 LICHEN SCLEROSUS OF FEMALE GENITALIA: ICD-10-CM

## 2022-11-03 DIAGNOSIS — H93.12 TINNITUS, LEFT: ICD-10-CM

## 2022-11-03 DIAGNOSIS — E11.9 TYPE 2 DIABETES MELLITUS NOT AT GOAL (H): Primary | ICD-10-CM

## 2022-11-03 LAB
HBA1C MFR BLD: 8.2 % (ref 0–5.6)
HOLD SPECIMEN: NORMAL

## 2022-11-03 PROCEDURE — 91312 COVID-19,PF,PFIZER BOOSTER BIVALENT: CPT | Performed by: FAMILY MEDICINE

## 2022-11-03 PROCEDURE — 99214 OFFICE O/P EST MOD 30 MIN: CPT | Mod: 25 | Performed by: FAMILY MEDICINE

## 2022-11-03 PROCEDURE — 0124A COVID-19,PF,PFIZER BOOSTER BIVALENT: CPT | Performed by: FAMILY MEDICINE

## 2022-11-03 PROCEDURE — 90471 IMMUNIZATION ADMIN: CPT | Performed by: FAMILY MEDICINE

## 2022-11-03 PROCEDURE — 36415 COLL VENOUS BLD VENIPUNCTURE: CPT | Performed by: FAMILY MEDICINE

## 2022-11-03 PROCEDURE — 83036 HEMOGLOBIN GLYCOSYLATED A1C: CPT | Performed by: FAMILY MEDICINE

## 2022-11-03 PROCEDURE — 90715 TDAP VACCINE 7 YRS/> IM: CPT | Performed by: FAMILY MEDICINE

## 2022-11-03 RX ORDER — CLOBETASOL PROPIONATE 0.5 MG/G
OINTMENT TOPICAL
Qty: 60 G | Refills: 1 | Status: SHIPPED | OUTPATIENT
Start: 2022-11-03 | End: 2023-08-07

## 2022-11-03 NOTE — PROGRESS NOTES
"  Assessment & Plan     ICD-10-CM    1. Type 2 diabetes mellitus not at goal (H)  E11.9 HEMOGLOBIN A1C     HEMOGLOBIN A1C     Semaglutide 3 MG TABS     AMB Adult Diabetes Educator Referral      2. Lichen sclerosus of female genitalia  N90.4 clobetasol (TEMOVATE) 0.05 % external ointment      3. Tinnitus, left  H93.12 Adult ENT  Referral        Medical decision making: Patient presents today for follow-up of her diabetes.  This is worsened.  This is discussed with the patient.  Plan is to add medication.  Discussed Trulicity.  Patient is opposed to having injections and does not feel comfortable.  We will start on oral semaglutide.  She would also benefit from diabetes educator referral.  This is being placed today.  Patient has complained about tinnitus and some muffled hearing on the left side.  She had seen a provider and was started on medications cetirizine and Flonase and has not had benefit.  Exam today shows normal tympanic membranes.  Referral to ENT is being done.  Discussed that further evaluation is needed for unilateral tinnitus.  Patient verbalizes understanding.  She does have lichen sclerosus and clobetasol refilled for her today.       BMI:   Estimated body mass index is 30.42 kg/m  as calculated from the following:    Height as of this encounter: 1.638 m (5' 4.5\").    Weight as of this encounter: 81.6 kg (180 lb).   Weight management plan: Discussed healthy diet and exercise guidelines    MEDICATIONS:   Orders Placed This Encounter   Medications     clobetasol (TEMOVATE) 0.05 % external ointment     Sig: [CLOBETASOL (TEMOVATE) 0.05 % OINTMENT] APPLY TO AFFECTED SKIN AS INSTRUCTED Strength: 0.05 %     Dispense:  60 g     Refill:  1     Semaglutide 3 MG TABS     Sig: Take 1 tablet by mouth daily     Dispense:  90 tablet     Refill:  3          - Continue other medications without change  Work on weight loss  Regular exercise    Return in about 3 months (around 2/3/2023) for Routine " preventive.    Mana Lynch MD  [facility]    Bolivar Chavez is a 67 year old accompanied by her N/A, presenting for the following health issues:  Diabetes (Diabetic check- pt is fasting today, pt wants to have her left ear checked out- pt was seen for a this a little while ago and it hasn't gotten better. )      History of Present Illness       Diabetes:   She presents for follow up of diabetes.  She is checking home blood glucose a few times a week. She checks blood glucose at bedtime.  Blood glucose is never over 200 and never under 70. When her blood glucose is low, the patient is asymptomatic for confusion, blurred vision, lethargy and reports not feeling dizzy, shaky, or weak.  She has no concerns regarding her diabetes at this time.  She is not experiencing numbness or burning in feet, excessive thirst, blurry vision, weight changes or redness, sores or blisters on feet.         She eats 0-1 servings of fruits and vegetables daily.She consumes 0 sweetened beverage(s) daily.She exercises with enough effort to increase her heart rate 9 or less minutes per day.  She exercises with enough effort to increase her heart rate 3 or less days per week.   She is taking medications regularly.     Patient Active Problem List   Diagnosis     Hyperlipidemia     Diabetes type 2, controlled (H)     Obesity (BMI 30-39.9)     Lichen sclerosus of female genitalia     Current Outpatient Medications   Medication     alcohol swab prep pads     aspirin 81 mg chewable tablet     atorvastatin (LIPITOR) 40 MG tablet     blood glucose (NO BRAND SPECIFIED) test strip     blood glucose calibration (NO BRAND SPECIFIED) solution     clobetasol (TEMOVATE) 0.05 % external ointment     estradiol (ESTRACE) 0.1 MG/GM vaginal cream     lisinopril (ZESTRIL) 5 MG tablet     metFORMIN (GLUCOPHAGE) 500 MG tablet     Semaglutide 3 MG TABS     thin (NO BRAND SPECIFIED) lancets     No current facility-administered  "medications for this visit.       Review of Systems   Constitutional, HEENT, cardiovascular, pulmonary, gi and gu systems are negative, except as otherwise noted.      Objective    /77 (BP Location: Left arm, Patient Position: Sitting, Cuff Size: Adult Large)   Pulse 86   Ht 1.638 m (5' 4.5\")   Wt 81.6 kg (180 lb)   LMP  (LMP Unknown)   SpO2 98%   BMI 30.42 kg/m    Body mass index is 30.42 kg/m .  Physical Exam   GENERAL: healthy, alert and no distress  NECK: no adenopathy, no asymmetry, masses, or scars and thyroid normal to palpation  RESP: lungs clear to auscultation - no rales, rhonchi or wheezes  CV: regular rate and rhythm, normal S1 S2, no S3 or S4, no murmur, click or rub, no peripheral edema and peripheral pulses strong  ABDOMEN: soft, nontender, no hepatosplenomegaly, no masses and bowel sounds normal  MS: no gross musculoskeletal defects noted, no edema  Diabetic foot exam: normal DP and PT pulses, no trophic changes or ulcerative lesions, normal sensory exam and normal monofilament exam    Results for orders placed or performed in visit on 11/03/22   HEMOGLOBIN A1C     Status: Abnormal   Result Value Ref Range    Hemoglobin A1C 8.2 (H) 0.0 - 5.6 %   Extra Tube     Status: None    Narrative    The following orders were created for panel order Extra Tube.  Procedure                               Abnormality         Status                     ---------                               -----------         ------                     Extra Green Top (Lithium...[[id number]]                      Final result                 Please view results for these tests on the individual orders.   Extra Green Top (Lithium Heparin) Tube     Status: None   Result Value Ref Range    Hold Specimen JIC                    "

## 2022-12-20 DIAGNOSIS — E78.5 HYPERLIPIDEMIA, UNSPECIFIED HYPERLIPIDEMIA TYPE: ICD-10-CM

## 2022-12-21 RX ORDER — ATORVASTATIN CALCIUM 40 MG/1
40 TABLET, FILM COATED ORAL AT BEDTIME
Qty: 90 TABLET | Refills: 1 | Status: SHIPPED | OUTPATIENT
Start: 2022-12-21 | End: 2023-05-12

## 2022-12-21 NOTE — TELEPHONE ENCOUNTER
"Last Written Prescription Date:  12/27/21  Last Fill Quantity: 90,  # refills: 3   Last office visit provider:  11/3/22     Requested Prescriptions   Pending Prescriptions Disp Refills     atorvastatin (LIPITOR) 40 MG tablet 90 tablet 3     Sig: Take 1 tablet (40 mg) by mouth At Bedtime       Statins Protocol Passed - 12/21/2022  1:04 PM        Passed - LDL on file in past 12 months     Recent Labs   Lab Test 03/22/22  0947   LDL 95             Passed - No abnormal creatine kinase in past 12 months     No lab results found.             Passed - Recent (12 mo) or future (30 days) visit within the authorizing provider's specialty     Patient has had an office visit with the authorizing provider or a provider within the authorizing providers department within the previous 12 mos or has a future within next 30 days. See \"Patient Info\" tab in inbasket, or \"Choose Columns\" in Meds & Orders section of the refill encounter.              Passed - Medication is active on med list        Passed - Patient is age 18 or older        Passed - No active pregnancy on record        Passed - No positive pregnancy test in past 12 months             Fede Ray RN 12/21/22 1:12 PM  "

## 2022-12-30 DIAGNOSIS — E11.9 TYPE 2 DIABETES MELLITUS WITHOUT COMPLICATION, WITHOUT LONG-TERM CURRENT USE OF INSULIN (H): ICD-10-CM

## 2023-01-01 NOTE — TELEPHONE ENCOUNTER
"Last Written Prescription Date:  10/4/2022  Last Fill Quantity: 360,  # refills: 0  Last office visit provider:  11/3/2022 with PCP Dr YOANNA Lnych     Requested Prescriptions   Pending Prescriptions Disp Refills     metFORMIN (GLUCOPHAGE) 500 MG tablet 360 tablet 1     Sig: Take 2 tablets (1,000 mg) by mouth 2 times daily (with meals)       Biguanide Agents Passed - 12/30/2022 11:32 AM        Passed - Patient is age 10 or older        Passed - Patient has documented A1c within the specified period of time.     If HgbA1C is 8 or greater, it needs to be on file within the past 3 months.  If less than 8, must be on file within the past 6 months.     Recent Labs   Lab Test 11/03/22  0849   A1C 8.2*             Passed - Patient's CR is NOT>1.4 OR Patient's EGFR is NOT<45 within past 12 mos.     Recent Labs   Lab Test 03/22/22  0947 08/27/20  1009   GFRESTIMATED 74 >60   GFRESTBLACK  --  >60       Recent Labs   Lab Test 03/22/22  0947   CR 0.87             Passed - Patient does NOT have a diagnosis of CHF.        Passed - Medication is active on med list        Passed - Patient is not pregnant        Passed - Patient has not had a positive pregnancy test within the past 12 mos.         Passed - Recent (6 mo) or future (30 days) visit within the authorizing provider's specialty     Patient had office visit in the last 6 months or has a visit in the next 30 days with authorizing provider or within the authorizing provider's specialty.  See \"Patient Info\" tab in inbasket, or \"Choose Columns\" in Meds & Orders section of the refill encounter.                 Gricel Fuentes RN 12/31/22 10:15 PM  "

## 2023-01-23 ENCOUNTER — OFFICE VISIT (OUTPATIENT)
Dept: AUDIOLOGY | Facility: CLINIC | Age: 68
End: 2023-01-23
Payer: COMMERCIAL

## 2023-01-23 ENCOUNTER — OFFICE VISIT (OUTPATIENT)
Dept: OTOLARYNGOLOGY | Facility: CLINIC | Age: 68
End: 2023-01-23
Payer: COMMERCIAL

## 2023-01-23 DIAGNOSIS — H90.3 SENSORINEURAL HEARING LOSS (SNHL), BILATERAL: Primary | ICD-10-CM

## 2023-01-23 DIAGNOSIS — R09.81 NASAL CONGESTION: ICD-10-CM

## 2023-01-23 DIAGNOSIS — H90.5 SENSORINEURAL HEARING LOSS (SNHL), UNSPECIFIED LATERALITY: ICD-10-CM

## 2023-01-23 DIAGNOSIS — H90.5 SENSORINEURAL HEARING LOSS (SNHL), UNSPECIFIED LATERALITY: Primary | ICD-10-CM

## 2023-01-23 DIAGNOSIS — H81.02 COCHLEAR HYDROPS OF LEFT EAR: Primary | ICD-10-CM

## 2023-01-23 DIAGNOSIS — H93.12 TINNITUS, LEFT: ICD-10-CM

## 2023-01-23 PROCEDURE — 92550 TYMPANOMETRY & REFLEX THRESH: CPT | Performed by: AUDIOLOGIST

## 2023-01-23 PROCEDURE — 92557 COMPREHENSIVE HEARING TEST: CPT | Performed by: AUDIOLOGIST

## 2023-01-23 PROCEDURE — 99204 OFFICE O/P NEW MOD 45 MIN: CPT | Performed by: OTOLARYNGOLOGY

## 2023-01-23 RX ORDER — AZELASTINE 1 MG/ML
SPRAY, METERED NASAL
Qty: 30 ML | Refills: 11 | Status: SHIPPED | OUTPATIENT
Start: 2023-01-23 | End: 2023-08-07

## 2023-01-23 NOTE — PROGRESS NOTES
CHIEF COMPLAINT: Patient presents with:  Ent Problem: Left ear ringing, plugged, hissing noise, tried Flonase and allergy medication.  Week ago woke up with siren sound in ear was put on antibiotic.  Right ear is fine.  Congested.  Decreased in th left ear, crackles in the left ear.          HISTORY OF PRESENT ILLNESS    Kathy was seen at the behest of Mana Lynch MD for left sided ear fullness and hearing loss. Symptoms have been present for 3 months  She eats out regularly ([employer]).   Recently was seen in  for worsening tinnitus after eating and [employer], and was treated with antibiotics, which gave her only some temporary relief.  She is a type II diabetic.  She has been using flonase on regular basis for nasal congestion.              REVIEW OF SYSTEMS    Review of Systems as per HPI and PMHx, otherwise 10 system review system are negative.       ALLERGIES    Patient has no known allergies.    CURRENT MEDICATIONS      Current Outpatient Medications:      alcohol swab prep pads, Use to swab area of injection/sixto as directed, Disp: 100 each, Rfl: 3     amoxicillin-clavulanate (AUGMENTIN) 875-125 MG tablet, Take 1 tablet by mouth, Disp: , Rfl:      aspirin 81 mg chewable tablet, [ASPIRIN 81 MG CHEWABLE TABLET] Chew 81 mg daily., Disp: , Rfl:      atorvastatin (LIPITOR) 40 MG tablet, Take 1 tablet (40 mg) by mouth At Bedtime, Disp: 90 tablet, Rfl: 1     blood glucose (NO BRAND SPECIFIED) test strip, Use to test blood sugar 1 times daily or as directed. To accompany: Blood Glucose Monitor Brands: per insurance., Disp: 100 strip, Rfl: 6     blood glucose calibration (NO BRAND SPECIFIED) solution, Use to calibrate blood glucose monitor as needed as directed. To accompany: Blood Glucose Monitor Brands: per insurance., Disp: 11 each, Rfl: 1     clobetasol (TEMOVATE) 0.05 % external ointment, [CLOBETASOL (TEMOVATE) 0.05 % OINTMENT] APPLY TO AFFECTED SKIN AS INSTRUCTED Strength: 0.05 %, Disp: 60 g, Rfl: 1      estradiol (ESTRACE) 0.1 MG/GM vaginal cream, INSERT 1 G TWICE A WEEK BY VAGINAL ROUTE., Disp: , Rfl:      lisinopril (ZESTRIL) 5 MG tablet, Take 1 tablet (5 mg) by mouth daily, Disp: 90 tablet, Rfl: 1     metFORMIN (GLUCOPHAGE) 500 MG tablet, Take 2 tablets (1,000 mg) by mouth 2 times daily (with meals), Disp: 360 tablet, Rfl: 1     Semaglutide 3 MG TABS, Take 1 tablet by mouth daily, Disp: 90 tablet, Rfl: 3     thin (NO BRAND SPECIFIED) lancets, Use to test blood sugar 1 times daily or as directed. To accompany: Blood Glucose Monitor Brands: per insurance., Disp: 1 each, Rfl: 3     PAST MEDICAL HISTORY    PAST MEDICAL HISTORY:   Past Medical History:   Diagnosis Date     Lichen sclerosus        PAST SURGICAL HISTORY    PAST SURGICAL HISTORY:   Past Surgical History:   Procedure Laterality Date     APPENDECTOMY       CHOLECYSTECTOMY         FAMILY  HISTORY    FAMILY HISTORY:   Family History   Problem Relation Age of Onset     Coronary Artery Disease Mother 85.00         at age 86     Coronary Artery Disease Father 70.00        Heart attack in 70s,  at age 91     No Known Problems Sister      No Known Problems Daughter      No Known Problems Maternal Grandmother      No Known Problems Maternal Grandfather      No Known Problems Paternal Grandmother      No Known Problems Paternal Grandfather      No Known Problems Maternal Aunt      No Known Problems Paternal Aunt      Hereditary Breast and Ovarian Cancer Syndrome No family hx of      Breast Cancer No family hx of      Cancer No family hx of      Colon Cancer No family hx of      Endometrial Cancer No family hx of      Ovarian Cancer No family hx of        SOCIAL HISTORY    SOCIAL HISTORY:   Social History     Tobacco Use     Smoking status: Never     Smokeless tobacco: Never   Substance Use Topics     Alcohol use: Never        PHYSICAL EXAM    HEAD: Normal appearance and symmetry:  No cutaneous lesions.      NECK:  supple     EARS: normal TM, EACs    FABIAN  256 lateralizes to right     EYES:  EOMI    CN VII/XII:  intact     NOSE:     Dorsum:   straight  Septum:  midline  Mucosa:  moist        ORAL CAVITY/OROPHARYNX:     Lips:  Normal.  Tongue: normal, midline  Mucosa:   no lesions     NECK:  Trachea:  midline.              Thyroid:  normal              Adenopathy:  none        NEURO:   Alert and Oriented     GAIT AND STATION:  normal     RESPIRATORY:   Symmetry and Respiratory effort     PSYCH:  Normal mood and affect     SKIN:   warm and dry         IMPRESSION:    Encounter Diagnoses   Name Primary?     Tinnitus, left      Cochlear hydrops of left ear Yes          RECOMMENDATIONS:    Low salt diet  Return visit 3 months for repeat audiogram  If not improved, will order transtympanic dexamethasone

## 2023-01-23 NOTE — LETTER
1/23/2023         RE: Kathy Posada  [address]  [place] [address]        Dear Colleague,    Thank you for referring your patient, Kathy Posada, to the [facility]. Please see a copy of my visit note below.    CHIEF COMPLAINT: Patient presents with:  Ent Problem: Left ear ringing, plugged, hissing noise, tried Flonase and allergy medication.  Week ago woke up with siren sound in ear was put on antibiotic.  Right ear is fine.  Congested.  Decreased in th left ear, crackles in the left ear.          HISTORY OF PRESENT ILLNESS    Kathy was seen at the behest of Mana Lynch MD for left sided ear fullness and hearing loss. Symptoms have been present for 3 months  She eats out regularly ([employer]).   Recently was seen in  for worsening tinnitus after eating and [employer], and was treated with antibiotics, which gave her only some temporary relief.  She is a type II diabetic.  She has been using flonase on regular basis for nasal congestion.              REVIEW OF SYSTEMS    Review of Systems as per HPI and PMHx, otherwise 10 system review system are negative.       ALLERGIES    Patient has no known allergies.    CURRENT MEDICATIONS      Current Outpatient Medications:      alcohol swab prep pads, Use to swab area of injection/sixto as directed, Disp: 100 each, Rfl: 3     amoxicillin-clavulanate (AUGMENTIN) 875-125 MG tablet, Take 1 tablet by mouth, Disp: , Rfl:      aspirin 81 mg chewable tablet, [ASPIRIN 81 MG CHEWABLE TABLET] Chew 81 mg daily., Disp: , Rfl:      atorvastatin (LIPITOR) 40 MG tablet, Take 1 tablet (40 mg) by mouth At Bedtime, Disp: 90 tablet, Rfl: 1     blood glucose (NO BRAND SPECIFIED) test strip, Use to test blood sugar 1 times daily or as directed. To accompany: Blood Glucose Monitor Brands: per insurance., Disp: 100 strip, Rfl: 6     blood glucose calibration (NO BRAND SPECIFIED) solution, Use to calibrate blood glucose monitor as needed as directed. To  accompany: Blood Glucose Monitor Brands: per insurance., Disp: 11 each, Rfl: 1     clobetasol (TEMOVATE) 0.05 % external ointment, [CLOBETASOL (TEMOVATE) 0.05 % OINTMENT] APPLY TO AFFECTED SKIN AS INSTRUCTED Strength: 0.05 %, Disp: 60 g, Rfl: 1     estradiol (ESTRACE) 0.1 MG/GM vaginal cream, INSERT 1 G TWICE A WEEK BY VAGINAL ROUTE., Disp: , Rfl:      lisinopril (ZESTRIL) 5 MG tablet, Take 1 tablet (5 mg) by mouth daily, Disp: 90 tablet, Rfl: 1     metFORMIN (GLUCOPHAGE) 500 MG tablet, Take 2 tablets (1,000 mg) by mouth 2 times daily (with meals), Disp: 360 tablet, Rfl: 1     Semaglutide 3 MG TABS, Take 1 tablet by mouth daily, Disp: 90 tablet, Rfl: 3     thin (NO BRAND SPECIFIED) lancets, Use to test blood sugar 1 times daily or as directed. To accompany: Blood Glucose Monitor Brands: per insurance., Disp: 1 each, Rfl: 3     PAST MEDICAL HISTORY    PAST MEDICAL HISTORY:   Past Medical History:   Diagnosis Date     Lichen sclerosus        PAST SURGICAL HISTORY    PAST SURGICAL HISTORY:   Past Surgical History:   Procedure Laterality Date     APPENDECTOMY       CHOLECYSTECTOMY         FAMILY  HISTORY    FAMILY HISTORY:   Family History   Problem Relation Age of Onset     Coronary Artery Disease Mother 85.00         at age 86     Coronary Artery Disease Father 70.00        Heart attack in 70s,  at age 91     No Known Problems Sister      No Known Problems Daughter      No Known Problems Maternal Grandmother      No Known Problems Maternal Grandfather      No Known Problems Paternal Grandmother      No Known Problems Paternal Grandfather      No Known Problems Maternal Aunt      No Known Problems Paternal Aunt      Hereditary Breast and Ovarian Cancer Syndrome No family hx of      Breast Cancer No family hx of      Cancer No family hx of      Colon Cancer No family hx of      Endometrial Cancer No family hx of      Ovarian Cancer No family hx of        SOCIAL HISTORY    SOCIAL HISTORY:   Social History      Tobacco Use     Smoking status: Never     Smokeless tobacco: Never   Substance Use Topics     Alcohol use: Never        PHYSICAL EXAM    HEAD: Normal appearance and symmetry:  No cutaneous lesions.      NECK:  supple     EARS: normal TM, EACs    FABIAN 256 lateralizes to right     EYES:  EOMI    CN VII/XII:  intact     NOSE:     Dorsum:   straight  Septum:  midline  Mucosa:  moist        ORAL CAVITY/OROPHARYNX:     Lips:  Normal.  Tongue: normal, midline  Mucosa:   no lesions     NECK:  Trachea:  midline.              Thyroid:  normal              Adenopathy:  none        NEURO:   Alert and Oriented     GAIT AND STATION:  normal     RESPIRATORY:   Symmetry and Respiratory effort     PSYCH:  Normal mood and affect     SKIN:   warm and dry         IMPRESSION:    Encounter Diagnoses   Name Primary?     Tinnitus, left      Cochlear hydrops of left ear Yes          RECOMMENDATIONS:    Low salt diet  Return visit 3 months for repeat audiogram  If not improved, will order transtympanic dexamethasone          Again, thank you for allowing me to participate in the care of your patient.        Sincerely,        Sandor Mcnally MD

## 2023-01-23 NOTE — PROGRESS NOTES
AUDIOLOGY REPORT    SUMMARY: Audiology visit completed. See audiogram for results.      RECOMMENDATIONS: Follow-up with ENT. Return for hearing aid consultation pending medical clearance and patient motivation.     Castillo Theodore, CCC-A  Clinical Audiologist  [place] #[id number]

## 2023-01-30 ASSESSMENT — ENCOUNTER SYMPTOMS
PALPITATIONS: 0
ABDOMINAL PAIN: 0
FREQUENCY: 0
SORE THROAT: 0
HEMATOCHEZIA: 0
CONSTIPATION: 0
BREAST MASS: 0
HEADACHES: 0
FEVER: 0
NAUSEA: 0
DIARRHEA: 0
NERVOUS/ANXIOUS: 0
MYALGIAS: 0
ARTHRALGIAS: 0
SHORTNESS OF BREATH: 0
EYE PAIN: 0
JOINT SWELLING: 0
HEARTBURN: 0
PARESTHESIAS: 0
COUGH: 0
CHILLS: 0
WEAKNESS: 0
DYSURIA: 0
DIZZINESS: 0
HEMATURIA: 0

## 2023-01-30 ASSESSMENT — ACTIVITIES OF DAILY LIVING (ADL): CURRENT_FUNCTION: NO ASSISTANCE NEEDED

## 2023-02-06 ENCOUNTER — OFFICE VISIT (OUTPATIENT)
Dept: FAMILY MEDICINE | Facility: CLINIC | Age: 68
End: 2023-02-06
Payer: COMMERCIAL

## 2023-02-06 ENCOUNTER — LAB (OUTPATIENT)
Dept: FAMILY MEDICINE | Facility: CLINIC | Age: 68
End: 2023-02-06

## 2023-02-06 VITALS
HEART RATE: 99 BPM | OXYGEN SATURATION: 95 % | WEIGHT: 174.8 LBS | SYSTOLIC BLOOD PRESSURE: 125 MMHG | BODY MASS INDEX: 29.84 KG/M2 | HEIGHT: 64 IN | RESPIRATION RATE: 16 BRPM | DIASTOLIC BLOOD PRESSURE: 68 MMHG

## 2023-02-06 DIAGNOSIS — Z78.0 POSTMENOPAUSAL STATUS: ICD-10-CM

## 2023-02-06 DIAGNOSIS — E11.9 TYPE 2 DIABETES MELLITUS NOT AT GOAL (H): ICD-10-CM

## 2023-02-06 DIAGNOSIS — E11.65 CONTROLLED TYPE 2 DIABETES MELLITUS WITH HYPERGLYCEMIA, WITHOUT LONG-TERM CURRENT USE OF INSULIN (H): ICD-10-CM

## 2023-02-06 DIAGNOSIS — E78.5 HYPERLIPIDEMIA, UNSPECIFIED HYPERLIPIDEMIA TYPE: ICD-10-CM

## 2023-02-06 DIAGNOSIS — H93.12 TINNITUS, LEFT: ICD-10-CM

## 2023-02-06 DIAGNOSIS — Z12.11 SCREEN FOR COLON CANCER: ICD-10-CM

## 2023-02-06 DIAGNOSIS — Z00.00 ENCOUNTER FOR MEDICARE ANNUAL WELLNESS EXAM: Primary | ICD-10-CM

## 2023-02-06 LAB
ALBUMIN SERPL BCG-MCNC: 4.3 G/DL (ref 3.5–5.2)
ALP SERPL-CCNC: 89 U/L (ref 35–104)
ALT SERPL W P-5'-P-CCNC: 29 U/L (ref 10–35)
ANION GAP SERPL CALCULATED.3IONS-SCNC: 18 MMOL/L (ref 7–15)
AST SERPL W P-5'-P-CCNC: 21 U/L (ref 10–35)
BASOPHILS # BLD AUTO: 0 10E3/UL (ref 0–0.2)
BASOPHILS NFR BLD AUTO: 1 %
BILIRUB SERPL-MCNC: 0.5 MG/DL
BUN SERPL-MCNC: 13.8 MG/DL (ref 8–23)
CALCIUM SERPL-MCNC: 9.4 MG/DL (ref 8.8–10.2)
CHLORIDE SERPL-SCNC: 103 MMOL/L (ref 98–107)
CHOLEST SERPL-MCNC: 185 MG/DL
CREAT SERPL-MCNC: 0.89 MG/DL (ref 0.51–0.95)
CREAT UR-MCNC: 189 MG/DL
DEPRECATED HCO3 PLAS-SCNC: 19 MMOL/L (ref 22–29)
EOSINOPHIL # BLD AUTO: 0.3 10E3/UL (ref 0–0.7)
EOSINOPHIL NFR BLD AUTO: 4 %
ERYTHROCYTE [DISTWIDTH] IN BLOOD BY AUTOMATED COUNT: 12.2 % (ref 10–15)
GFR SERPL CREATININE-BSD FRML MDRD: 71 ML/MIN/1.73M2
GLUCOSE SERPL-MCNC: 157 MG/DL (ref 70–99)
HBA1C MFR BLD: 7.6 % (ref 0–5.6)
HCT VFR BLD AUTO: 43.3 % (ref 35–47)
HDLC SERPL-MCNC: 58 MG/DL
HGB BLD-MCNC: 14.6 G/DL (ref 11.7–15.7)
IMM GRANULOCYTES # BLD: 0 10E3/UL
IMM GRANULOCYTES NFR BLD: 0 %
LDLC SERPL CALC-MCNC: 98 MG/DL
LYMPHOCYTES # BLD AUTO: 2.1 10E3/UL (ref 0.8–5.3)
LYMPHOCYTES NFR BLD AUTO: 28 %
MCH RBC QN AUTO: 29.7 PG (ref 26.5–33)
MCHC RBC AUTO-ENTMCNC: 33.7 G/DL (ref 31.5–36.5)
MCV RBC AUTO: 88 FL (ref 78–100)
MICROALBUMIN UR-MCNC: 22 MG/L
MICROALBUMIN/CREAT UR: 11.64 MG/G CR (ref 0–25)
MONOCYTES # BLD AUTO: 0.7 10E3/UL (ref 0–1.3)
MONOCYTES NFR BLD AUTO: 9 %
NEUTROPHILS # BLD AUTO: 4.4 10E3/UL (ref 1.6–8.3)
NEUTROPHILS NFR BLD AUTO: 59 %
NONHDLC SERPL-MCNC: 127 MG/DL
PLATELET # BLD AUTO: 337 10E3/UL (ref 150–450)
POTASSIUM SERPL-SCNC: 3.9 MMOL/L (ref 3.4–5.3)
PROT SERPL-MCNC: 7.4 G/DL (ref 6.4–8.3)
RBC # BLD AUTO: 4.91 10E6/UL (ref 3.8–5.2)
SODIUM SERPL-SCNC: 140 MMOL/L (ref 136–145)
TRIGL SERPL-MCNC: 145 MG/DL
WBC # BLD AUTO: 7.4 10E3/UL (ref 4–11)

## 2023-02-06 PROCEDURE — 80061 LIPID PANEL: CPT | Performed by: FAMILY MEDICINE

## 2023-02-06 PROCEDURE — 85025 COMPLETE CBC W/AUTO DIFF WBC: CPT | Performed by: FAMILY MEDICINE

## 2023-02-06 PROCEDURE — 36415 COLL VENOUS BLD VENIPUNCTURE: CPT | Performed by: FAMILY MEDICINE

## 2023-02-06 PROCEDURE — 82043 UR ALBUMIN QUANTITATIVE: CPT | Performed by: FAMILY MEDICINE

## 2023-02-06 PROCEDURE — 80053 COMPREHEN METABOLIC PANEL: CPT | Performed by: FAMILY MEDICINE

## 2023-02-06 PROCEDURE — 99214 OFFICE O/P EST MOD 30 MIN: CPT | Mod: 25 | Performed by: FAMILY MEDICINE

## 2023-02-06 PROCEDURE — 82570 ASSAY OF URINE CREATININE: CPT | Performed by: FAMILY MEDICINE

## 2023-02-06 PROCEDURE — 83036 HEMOGLOBIN GLYCOSYLATED A1C: CPT | Performed by: FAMILY MEDICINE

## 2023-02-06 PROCEDURE — G0438 PPPS, INITIAL VISIT: HCPCS | Performed by: FAMILY MEDICINE

## 2023-02-06 ASSESSMENT — ENCOUNTER SYMPTOMS
FREQUENCY: 0
WEAKNESS: 0
COUGH: 0
CHILLS: 0
FEVER: 0
SORE THROAT: 0
PALPITATIONS: 0
ABDOMINAL PAIN: 0
CONSTIPATION: 0
SHORTNESS OF BREATH: 0
BREAST MASS: 0
DYSURIA: 0
NERVOUS/ANXIOUS: 0
DIARRHEA: 0
ARTHRALGIAS: 0
MYALGIAS: 0
JOINT SWELLING: 0
HEARTBURN: 0
HEMATURIA: 0
NAUSEA: 0
PARESTHESIAS: 0
EYE PAIN: 0
HEMATOCHEZIA: 0
DIZZINESS: 0
HEADACHES: 0

## 2023-02-06 ASSESSMENT — ACTIVITIES OF DAILY LIVING (ADL): CURRENT_FUNCTION: NO ASSISTANCE NEEDED

## 2023-02-06 NOTE — PATIENT INSTRUCTIONS
Patient Education   Personalized Prevention Plan  You are due for the preventive services outlined below.  Your care team is available to assist you in scheduling these services.  If you have already completed any of these items, please share that information with your care team to update in your medical record.  Health Maintenance Due   Topic Date Due     Kidney Microalbumin Urine Test  08/03/2022     A1C Lab  02/03/2023     Colorectal Cancer Screening  02/18/2023       Signs of Hearing Loss      Hearing much better with one ear can be a sign of hearing loss.   Hearing loss is a problem shared by many people. In fact, it is one of the most common health problems, particularly as people age. Most people age 65 and older have some hearing loss. By age 80, almost everyone does. Hearing loss often occurs slowly over the years. So you may not realize your hearing has gotten worse.  Have your hearing checked  Call your healthcare provider if you:    Have to strain to hear normal conversation    Have to watch other people s faces very carefully to follow what they re saying    Need to ask people to repeat what they ve said    Often misunderstand what people are saying    Turn the volume of the television or radio up so high that others complain    Feel that people are mumbling when they re talking to you    Find that the effort to hear leaves you feeling tired and irritated    Notice, when using the phone, that you hear better with one ear than the other  [employer] last reviewed this educational content on 1/1/2020 2000-2021 [employer]. All rights reserved. This information is not intended as a substitute for professional medical care. Always follow your healthcare professional's instructions.

## 2023-02-06 NOTE — PROGRESS NOTES
ASSESSMENT / PLAN:       ICD-10-CM    1. Encounter for Medicare annual wellness exam  Z00.00 Comprehensive metabolic panel (BMP + Alb, Alk Phos, ALT, AST, Total. Bili, TP)     Lipid panel     CBC with platelets and differential      2. Screen for colon cancer  Z12.11 COLOGUARD(EXACT SCIENCES)      3. Controlled type 2 diabetes mellitus with hyperglycemia, without long-term current use of insulin (H)  E11.65 Hemoglobin A1c      4. Postmenopausal status  Z78.0 DX Hip/Pelvis/Spine      5. Hyperlipidemia, unspecified hyperlipidemia type  E78.5       6. Type 2 diabetes mellitus not at goal (H)  E11.9 Semaglutide 3 MG TABS      7. Tinnitus, left  H93.12         Medical decision making: Patient with diabetes and hyperlipidemia presents today for a follow-up.  Since her last visit, she has been trying to eat better.  She had tinnitus has been seen by ENT and has been asked to restrict sodium as this may be contributing to her tinnitus.  In addition, she was told that lisinopril can also cause tinnitus.  Reviewed her health history.  Patient does not have hypertension.  She was started on lisinopril based on previous microalbumin urea guidelines.  I discussed with the patient that we can discontinue lisinopril and recheck her in 3 months.  If blood pressure remains high in need of medication, we can consider losartan.  Patient is agreeable with this plan.  She has been successful in losing some weight.    Reviewed health maintenance including DEXA scan.  She is up-to-date with her vision exam and dental exam.  Labs as above.  I will reach out to her after her A1c is obtained regarding further management.      COUNSELING:  Reviewed preventive health counseling, as reflected in patient instructions       Regular exercise       Healthy diet/nutrition       Vision screening       Hearing screening       Dental care       Osteoporosis prevention/bone health        She reports that she has never smoked. She has never used  "smokeless tobacco.      Appropriate preventive services were discussed with this patient, including applicable screening as appropriate for cardiovascular disease, diabetes, osteopenia/osteoporosis, and glaucoma.  As appropriate for age/gender, discussed screening for colorectal cancer, prostate cancer, breast cancer, and cervical cancer. Checklist reviewing preventive services available has been given to the patient.    Reviewed patients plan of care and provided an AVS. The Basic Care Plan (routine screening as documented in Health Maintenance) for Kathy meets the Care Plan requirement. This Care Plan has been established and reviewed with the Patient.      SUBJECTIVE:   Kathy is a 67 year old who presents for Preventive Visit.  Patient has been advised of split billing requirements and indicates understanding: Yes  Are you in the first 12 months of your Medicare coverage?  No    Healthy Habits:     In general, how would you rate your overall health?  Good    Frequency of exercise:  2-3 days/week    Duration of exercise:  15-30 minutes    Do you usually eat at least 4 servings of fruit and vegetables a day, include whole grains    & fiber and avoid regularly eating high fat or \"junk\" foods?  Yes    Taking medications regularly:  Yes    Barriers to taking medications:  None    Medication side effects:  None    Ability to successfully perform activities of daily living:  No assistance needed    Home Safety:  No safety concerns identified    Hearing Impairment:  Need to ask people to speak up or repeat themselves    In the past 6 months, have you been bothered by leaking of urine?  No    In general, how would you rate your overall mental or emotional health?  Good      PHQ-2 Total Score: 0    Additional concerns today:  Yes      Have you ever done Advance Care Planning? (For example, a Health Directive, POLST, or a discussion with a medical provider or your loved ones about your wishes): Yes, patient states has an " Advance Care Planning document and will bring a copy to the clinic.       Fall risk  Fallen 2 or more times in the past year?: No  Any fall with injury in the past year?: No    Cognitive Screening   1) Repeat 3 items (Leader, Season, Table)    2) Clock draw: NORMAL  3) 3 item recall: Recalls 3 objects  Results: 3 items recalled: COGNITIVE IMPAIRMENT LESS LIKELY    Mini-CogTM Copyright S Mindy. Licensed by the author for use in [facility]; reprinted with permission (carlos@[facility]). All rights reserved.      Do you have sleep apnea, excessive snoring or daytime drowsiness?: no    Reviewed and updated as needed this visit by clinical staff   Tobacco  Allergies  Meds              Reviewed and updated as needed this visit by Provider                 Social History     Tobacco Use     Smoking status: Never     Smokeless tobacco: Never   Substance Use Topics     Alcohol use: Never     If you drink alcohol do you typically have >3 drinks per day or >7 drinks per week? No    No flowsheet data found.            Current providers sharing in care for this patient include:   Patient Care Team:  Mana Lycnh MD as PCP - General (Family Medicine)  Mana Lynch MD as Assigned PCP  Sandor Mcnally MD as MD (Otolaryngology)  Guillermina Narvaez AuD as Audiologist (Audiology)  Sandor Mcnally MD as Assigned Surgical Provider    The following health maintenance items are reviewed in Epic and correct as of today:  Health Maintenance   Topic Date Due     MICROALBUMIN  08/03/2022     A1C  02/03/2023     COLORECTAL CANCER SCREENING  02/18/2023     BMP  03/22/2023     LIPID  03/22/2023     ANNUAL REVIEW OF HM ORDERS  03/22/2023     EYE EXAM  09/22/2023     DIABETIC FOOT EXAM  11/03/2023     MEDICARE ANNUAL WELLNESS VISIT  02/06/2024     FALL RISK ASSESSMENT  02/06/2024     MAMMO SCREENING  04/26/2024     ADVANCE CARE PLANNING  02/06/2028     DTAP/TDAP/TD IMMUNIZATION (6 - Td or Tdap) 11/03/2032     DEXA   2032     HEPATITIS C SCREENING  Completed     PHQ-2 (once per calendar year)  Completed     INFLUENZA VACCINE  Completed     Pneumococcal Vaccine: 65+ Years  Completed     ZOSTER IMMUNIZATION  Completed     COVID-19 Vaccine  Completed     IPV IMMUNIZATION  Aged Out     MENINGITIS IMMUNIZATION  Aged Out     PAP  Discontinued     BP Readings from Last 3 Encounters:   23 125/68   22 129/77   22 139/83    Wt Readings from Last 3 Encounters:   23 79.3 kg (174 lb 12.8 oz)   22 81.6 kg (180 lb)   22 81 kg (178 lb 9.6 oz)                  Patient Active Problem List   Diagnosis     Hyperlipidemia     Diabetes type 2, controlled (H)     Obesity (BMI 30-39.9)     Lichen sclerosus of female genitalia     Controlled type 2 diabetes mellitus with hyperglycemia, without long-term current use of insulin (H)     Past Surgical History:   Procedure Laterality Date     APPENDECTOMY       CHOLECYSTECTOMY         Social History     Tobacco Use     Smoking status: Never     Smokeless tobacco: Never   Substance Use Topics     Alcohol use: Never     Family History   Problem Relation Age of Onset     Coronary Artery Disease Mother 85.00         at age 86     Coronary Artery Disease Father 70.00        Heart attack in 70s,  at age 91     No Known Problems Sister      No Known Problems Daughter      No Known Problems Maternal Grandmother      No Known Problems Maternal Grandfather      No Known Problems Paternal Grandmother      No Known Problems Paternal Grandfather      No Known Problems Maternal Aunt      No Known Problems Paternal Aunt      Hereditary Breast and Ovarian Cancer Syndrome No family hx of      Breast Cancer No family hx of      Cancer No family hx of      Colon Cancer No family hx of      Endometrial Cancer No family hx of      Ovarian Cancer No family hx of          Current Outpatient Medications   Medication Sig Dispense Refill     alcohol swab prep pads Use to swab area of  injection/sixto as directed 100 each 3     aspirin 81 mg chewable tablet [ASPIRIN 81 MG CHEWABLE TABLET] Chew 81 mg daily.       atorvastatin (LIPITOR) 40 MG tablet Take 1 tablet (40 mg) by mouth At Bedtime 90 tablet 1     azelastine (ASTELIN) 0.1 % nasal spray 2 sprays each nostril 1-2x daily as needed for nasal congestion (use nightly for first 2 week) 30 mL 11     blood glucose (NO BRAND SPECIFIED) test strip Use to test blood sugar 1 times daily or as directed. To accompany: Blood Glucose Monitor Brands: per insurance. 100 strip 6     blood glucose calibration (NO BRAND SPECIFIED) solution Use to calibrate blood glucose monitor as needed as directed. To accompany: Blood Glucose Monitor Brands: per insurance. 11 each 1     clobetasol (TEMOVATE) 0.05 % external ointment [CLOBETASOL (TEMOVATE) 0.05 % OINTMENT] APPLY TO AFFECTED SKIN AS INSTRUCTED Strength: 0.05 % 60 g 1     estradiol (ESTRACE) 0.1 MG/GM vaginal cream INSERT 1 G TWICE A WEEK BY VAGINAL ROUTE.       metFORMIN (GLUCOPHAGE) 500 MG tablet Take 2 tablets (1,000 mg) by mouth 2 times daily (with meals) 360 tablet 1     Semaglutide 3 MG TABS Take 1 tablet by mouth daily 90 tablet 3     thin (NO BRAND SPECIFIED) lancets Use to test blood sugar 1 times daily or as directed. To accompany: Blood Glucose Monitor Brands: per insurance. 1 each 3     Mammogram Screening: Mammogram Screening: Recommended mammography every 1-2 years with patient discussion and risk factor consideration    FHS-7:   Breast CA Risk Assessment (FHS-7) 4/26/2022   Did any of your first-degree relatives have breast or ovarian cancer? No   Did any of your relatives have bilateral breast cancer? No   Did any man in your family have breast cancer? No   Did any woman in your family have breast and ovarian cancer? No   Did any woman in your family have breast cancer before age 50 y? No   Do you have 2 or more relatives with breast and/or ovarian cancer? No   Do you have 2 or more relatives with  "breast and/or bowel cancer? No         Pertinent mammograms are reviewed under the imaging tab.    Review of Systems   Constitutional: Negative for chills and fever.   HENT: Positive for hearing loss. Negative for congestion, ear pain and sore throat.    Eyes: Negative for pain and visual disturbance.   Respiratory: Negative for cough and shortness of breath.    Cardiovascular: Negative for chest pain, palpitations and peripheral edema.   Gastrointestinal: Negative for abdominal pain, constipation, diarrhea, heartburn, hematochezia and nausea.   Breasts:  Negative for tenderness, breast mass and discharge.   Genitourinary: Negative for dysuria, frequency, genital sores, hematuria, pelvic pain, urgency, vaginal bleeding and vaginal discharge.   Musculoskeletal: Negative for arthralgias, joint swelling and myalgias.   Skin: Positive for rash.   Neurological: Negative for dizziness, weakness, headaches and paresthesias.   Psychiatric/Behavioral: Negative for mood changes. The patient is not nervous/anxious.      The rash has resolved.  For hearing loss she is following with ENT.    OBJECTIVE:   /68 (BP Location: Left arm, Patient Position: Sitting, Cuff Size: Adult Large)   Pulse 99   Resp 16   Ht 1.632 m (5' 4.25\")   Wt 79.3 kg (174 lb 12.8 oz)   LMP  (LMP Unknown)   SpO2 95%   BMI 29.77 kg/m   Estimated body mass index is 29.77 kg/m  as calculated from the following:    Height as of this encounter: 1.632 m (5' 4.25\").    Weight as of this encounter: 79.3 kg (174 lb 12.8 oz).  Physical Exam  GENERAL: healthy, alert and no distress  NECK: no adenopathy, no asymmetry, masses, or scars and thyroid normal to palpation  RESP: lungs clear to auscultation - no rales, rhonchi or wheezes  CV: regular rate and rhythm, normal S1 S2, no S3 or S4, no murmur, click or rub, no peripheral edema and peripheral pulses strong  ABDOMEN: soft, nontender, no hepatosplenomegaly, no masses and bowel sounds normal  MS: no gross " musculoskeletal defects noted, no edema    Diagnostic Test Results:  Labs reviewed in Epic  Results for orders placed or performed in visit on 02/06/23 (from the past 24 hour(s))   CBC with platelets and differential    Narrative    The following orders were created for panel order CBC with platelets and differential.  Procedure                               Abnormality         Status                     ---------                               -----------         ------                     CBC with platelets and d...[[id number]]                      In process                   Please view results for these tests on the individual orders.       Mana Lynch MD  [facility]    Identified Health Risks:

## 2023-02-06 NOTE — PROGRESS NOTES
"Answers for HPI/ROS submitted by the patient on 1/30/2023  In general, how would you rate your overall physical health?: good  Frequency of exercise:: 2-3 days/week  Do you usually eat at least 4 servings of fruit and vegetables a day, include whole grains & fiber, and avoid regularly eating high fat or \"junk\" foods? : Yes  Taking medications regularly:: Yes  Medication side effects:: None  Activities of Daily Living: no assistance needed  Home safety: no safety concerns identified  Hearing Impairment:: need to ask people to speak up or repeat themselves  In the past 6 months, have you been bothered by leaking of urine?: No  abdominal pain: No  Blood in stool: No  Blood in urine: No  chest pain: No  chills: No  congestion: No  constipation: No  cough: No  diarrhea: No  dizziness: No  ear pain: No  eye pain: No  nervous/anxious: No  fever: No  frequency: No  genital sores: No  headaches: No  hearing loss: Yes  heartburn: No  arthralgias: No  joint swelling: No  peripheral edema: No  mood changes: No  myalgias: No  nausea: No  dysuria: No  palpitations: No  Skin sensation changes: No  sore throat: No  urgency: No  rash: Yes  shortness of breath: No  visual disturbance: No  weakness: No  pelvic pain: No  vaginal bleeding: No  vaginal discharge: No  tenderness: No  breast mass: No  breast discharge: No  In general, how would you rate your overall mental or emotional health?: good  Additional concerns today:: Yes  Duration of exercise:: 15-30 minutes        The patient was provided with written information regarding signs of hearing loss.  "

## 2023-02-15 LAB — NONINV COLON CA DNA+OCC BLD SCRN STL QL: NEGATIVE

## 2023-03-22 ENCOUNTER — OFFICE VISIT (OUTPATIENT)
Dept: AUDIOLOGY | Facility: CLINIC | Age: 68
End: 2023-03-22
Payer: COMMERCIAL

## 2023-03-22 DIAGNOSIS — H90.5 SENSORINEURAL HEARING LOSS (SNHL), UNSPECIFIED LATERALITY: Primary | ICD-10-CM

## 2023-03-22 DIAGNOSIS — H90.42 SENSORINEURAL HEARING LOSS (SNHL) OF LEFT EAR WITH UNRESTRICTED HEARING OF RIGHT EAR: Primary | ICD-10-CM

## 2023-03-22 DIAGNOSIS — H90.5 SENSORINEURAL HEARING LOSS (SNHL), UNSPECIFIED LATERALITY: ICD-10-CM

## 2023-03-22 PROCEDURE — 92550 TYMPANOMETRY & REFLEX THRESH: CPT | Mod: 52 | Performed by: AUDIOLOGIST

## 2023-03-22 PROCEDURE — 92557 COMPREHENSIVE HEARING TEST: CPT | Performed by: AUDIOLOGIST

## 2023-03-22 NOTE — PROGRESS NOTES
AUDIOLOGY REPORT    SUMMARY: Audiology visit completed. See audiogram for results.     RECOMMENDATIONS: Follow-up with ENT as scheduled.    Jose De Jesus Plasencia., [facility]  [place] Licensed Audiologist #[id number]

## 2023-04-17 ENCOUNTER — OFFICE VISIT (OUTPATIENT)
Dept: OTOLARYNGOLOGY | Facility: CLINIC | Age: 68
End: 2023-04-17
Payer: COMMERCIAL

## 2023-04-17 DIAGNOSIS — H81.02 COCHLEAR HYDROPS OF LEFT EAR: Primary | ICD-10-CM

## 2023-04-17 PROCEDURE — 99213 OFFICE O/P EST LOW 20 MIN: CPT | Mod: 25 | Performed by: OTOLARYNGOLOGY

## 2023-04-17 PROCEDURE — 69801 INCISE INNER EAR: CPT | Mod: LT | Performed by: OTOLARYNGOLOGY

## 2023-04-17 NOTE — LETTER
4/17/2023         RE: Kathy Posada  [address] [place] [address]        Dear Colleague,    Thank you for referring your patient, Kathy Posada, to the [facility]. Please see a copy of my visit note below.    lCHIEF COMPLAINT:  No chief complaint on file.         HISTORY OF PRESENT ILLNESS    Kathy was seen in follow up after previous 1/23/2023 visit for audiogram review.     My previous note:    Encounter Diagnoses   Name Primary?     Tinnitus, left       Cochlear hydrops of left ear Yes        RECOMMENDATIONS:     Low salt diet  Return visit 3 months for repeat audiogram  If not improved, will order transtympanic dexamethasone             REVIEW OF SYSTEMS    Review of Systems: a 10-system review is reviewed at this encounter.  See scanned document.       No Known Allergies        PHYSICAL EXAM:        HEAD: Normal appearance and symmetry:  No cutaneous lesions.      EARS:        RIGHT:  {external auditory canals clear, tympanic membranes normal   LEFT:    {external auditory canals clear, tympanic membranes normal    Procedure: LEFT low frequency sensorineural hearing loss  Indication: Improve hearing     I discussed the risks, benefits, alternatives, options of a  transtympanic dexamethasone injection including, but not limited to: Risk of bleeding, risk of infection, possible tympanic membranes perforation, potential need for additional procedures.  The patient voiced understanding and is willing to proceed.  Informed consent was signed.  Next, the patient was placed supinely in the examination chair.  The  LEFT ear was examined under the otic microscope.  Phenol solution was used to anesthetize an area of the posterior-superior quadrant.  Phenol solution was not used given there was a previous injection site.  Next, a 25-gauge needle was then used to instill 0.5  mL of 10 mg/mL dexamethasone.  The patient laid supine for 10-15 minutes following the injection.  This  marked the end of the procedure.  Patient tolerated the procedure well.  There were discharged from the clinic care in excellent condition.  There is no complication.  There is minimal blood loss.  All standard protocol and universal precautions were used throughout the procedure.         NOSE:    Dorsum:   straight       ORAL CAVITY/OROPHARYNX:    Lips:  Normal.     NECK:  Trachea:  midline     NEURO:   Alert and Oriented    GAIT AND STATION:  normal     RESPIRATORY:   Symmetry and Respiratory effort    PSYCH:   normal mood and affect    SKIN:  warm and dry         IMPRESSION: No diagnosis found.         RECOMMENDATIONS:    No orders of the defined types were placed in this encounter.     No orders of the defined types were placed in this encounter.            Again, thank you for allowing me to participate in the care of your patient.        Sincerely,        Sandor Mcnally MD

## 2023-04-17 NOTE — PROGRESS NOTES
[facility] COMPLAINT:  Patient presents with:  Follow Up: She states she is still having left ear tinnitus and hissing noise, she is following the low salt diet as well as she can and her primary took her off her lisinopril for the past 3 months and she is not noticing anything different. Completed the hearing test and it is in the chart         HISTORY OF PRESENT ILLNESS    Kathy was seen in follow up after previous 1/23/2023 visit for audiogram review.     My previous note:    Encounter Diagnoses   Name Primary?     Tinnitus, left       Cochlear hydrops of left ear Yes        RECOMMENDATIONS:     Low salt diet  Return visit 3 months for repeat audiogram  If not improved, will order transtympanic dexamethasone             REVIEW OF SYSTEMS    Review of Systems: a 10-system review is reviewed at this encounter.  See scanned document.       Allergies   Allergen Reactions     Amoxicillin Rash     Broke out in a rash after a round of this            PHYSICAL EXAM:        HEAD: Normal appearance and symmetry:  No cutaneous lesions.      EARS:        RIGHT:  {external auditory canals clear, tympanic membranes normal   LEFT:    {external auditory canals clear, tympanic membranes normal    Procedure: LEFT low frequency sensorineural hearing loss  Indication: Improve hearing     I discussed the risks, benefits, alternatives, options of a  transtympanic dexamethasone injection including, but not limited to: Risk of bleeding, risk of infection, possible tympanic membranes perforation, potential need for additional procedures.  The patient voiced understanding and is willing to proceed.  Informed consent was signed.  Next, the patient was placed supinely in the examination chair.  The  LEFT ear was examined under the otic microscope.  Phenol solution was used to anesthetize an area of the posterior-superior quadrant.  Phenol solution was not used given there was a previous injection site.  Next, a 25-gauge needle was then used to  instill 0.5  mL of 10 mg/mL dexamethasone.  The patient laid supine for 10-15 minutes following the injection.  This marked the end of the procedure.  Patient tolerated the procedure well.  There were discharged from the clinic care in excellent condition.  There is no complication.  There is minimal blood loss.  All standard protocol and universal precautions were used throughout the procedure.         NOSE:    Dorsum:   straight       ORAL CAVITY/OROPHARYNX:    Lips:  Normal.     NECK:  Trachea:  midline     NEURO:   Alert and Oriented    GAIT AND STATION:  normal     RESPIRATORY:   Symmetry and Respiratory effort    PSYCH:   normal mood and affect    SKIN:  warm and dry         IMPRESSION:   Encounter Diagnosis   Name Primary?     Cochlear hydrops of left ear Yes            RECOMMENDATIONS:    Orders Placed This Encounter   Procedures     [place] EXCIS LABYRINTH,TRANSCANAL      No orders of the defined types were placed in this encounter.    No nose blowing 1 week.  Return visit with audiogram in 2-3 months.

## 2023-05-08 ENCOUNTER — OFFICE VISIT (OUTPATIENT)
Dept: FAMILY MEDICINE | Facility: CLINIC | Age: 68
End: 2023-05-08
Payer: COMMERCIAL

## 2023-05-08 VITALS
DIASTOLIC BLOOD PRESSURE: 75 MMHG | OXYGEN SATURATION: 97 % | BODY MASS INDEX: 30.9 KG/M2 | HEART RATE: 92 BPM | RESPIRATION RATE: 16 BRPM | HEIGHT: 64 IN | SYSTOLIC BLOOD PRESSURE: 138 MMHG | WEIGHT: 181 LBS

## 2023-05-08 DIAGNOSIS — H93.12 TINNITUS, LEFT: ICD-10-CM

## 2023-05-08 DIAGNOSIS — E11.9 TYPE 2 DIABETES MELLITUS WITHOUT COMPLICATION, WITHOUT LONG-TERM CURRENT USE OF INSULIN (H): Primary | ICD-10-CM

## 2023-05-08 DIAGNOSIS — I10 ESSENTIAL HYPERTENSION: ICD-10-CM

## 2023-05-08 DIAGNOSIS — E78.5 HYPERLIPIDEMIA, UNSPECIFIED HYPERLIPIDEMIA TYPE: ICD-10-CM

## 2023-05-08 LAB — HBA1C MFR BLD: 8.4 % (ref 0–5.6)

## 2023-05-08 PROCEDURE — 99214 OFFICE O/P EST MOD 30 MIN: CPT | Performed by: FAMILY MEDICINE

## 2023-05-08 PROCEDURE — 36415 COLL VENOUS BLD VENIPUNCTURE: CPT | Performed by: FAMILY MEDICINE

## 2023-05-08 PROCEDURE — 83036 HEMOGLOBIN GLYCOSYLATED A1C: CPT | Performed by: FAMILY MEDICINE

## 2023-05-08 NOTE — PROGRESS NOTES
"  Assessment & Plan     ICD-10-CM    1. Type 2 diabetes mellitus without complication, without long-term current use of insulin (H)  E11.9 HEMOGLOBIN A1C     AMB Adult Diabetes Educator Referral      2. Hyperlipidemia, unspecified hyperlipidemia type  E78.5       3. Tinnitus, left  H93.12       4. Essential hypertension  I10 AMB Adult Diabetes Educator Referral        Patient presents today for follow-up  1: Type 2 diabetes: Worsening A1c.  Feels like this is due to the prolonged winter.  No exercise.  Advise lifestyle modification.  Referral to diabetes educator for continuous glucose monitoring so that patient has more awareness regarding the food that raises glucose.  She does not check her glucose at home.  She does plan to be more active with opening her cabin and her pool.  She did get some steroid injections for a ear and was told that this may also affect her diabetes  2: Tinnitus of the left ear: This is due to Cycl[place] hydrops.  She did receive a recent steroid injection in her ear.  She felt good for 2 days and symptoms has since then recurred.  This does make her feel depressed.  Reviewed notes from ENT visit from last month.  She has an upcoming appointment again to be monitored  3: Essential hypertension: Under fair control and off losartan.  Blood pressure in fair range.  We will continue to monitor.  Anticipate further control with lifestyle modification.  4: Hyperlipidemia: On statin.    Lab Results   Component Value Date    A1C 8.4 05/08/2023    A1C 7.6 02/06/2023    A1C 8.2 11/03/2022    A1C 7.7 03/22/2022    A1C 7.4 08/03/2021            BMI:   Estimated body mass index is 30.83 kg/m  as calculated from the following:    Height as of this encounter: 1.632 m (5' 4.25\").    Weight as of this encounter: 82.1 kg (181 lb).   Weight management plan: Discussed healthy diet and exercise guidelines    MEDICATIONS:  Continue current medications without change  CONSULTATION/REFERRAL to diabetes " "educator  Work on weight loss  Regular exercise    Mana Lynch MD  [facility]    Bolivar Chavez is a 67 year old, presenting for the following health issues:  Diabetes (Pt is fasting today, no concerns today. )        5/8/2023     8:55 AM   Additional Questions   Roomed by Yelena Arreola CMA   Accompanied by N/A     History of Present Illness       Diabetes:   She presents for follow up of diabetes.  She is not checking blood glucose. She has no concerns regarding her diabetes at this time.  She is not experiencing numbness or burning in feet, excessive thirst, blurry vision, weight changes or redness, sores or blisters on feet.         She eats 4 or more servings of fruits and vegetables daily.She consumes 0 sweetened beverage(s) daily.She exercises with enough effort to increase her heart rate 9 or less minutes per day.  She exercises with enough effort to increase her heart rate 4 days per week.   She is taking medications regularly.       Patient Active Problem List   Diagnosis     Hyperlipidemia     Diabetes type 2, controlled (H)     Obesity (BMI 30-39.9)     Lichen sclerosus of female genitalia     Controlled type 2 diabetes mellitus with hyperglycemia, without long-term current use of insulin (H)     Current Outpatient Medications   Medication     alcohol swab prep pads     atorvastatin (LIPITOR) 40 MG tablet     azelastine (ASTELIN) 0.1 % nasal spray     clobetasol (TEMOVATE) 0.05 % external ointment     metFORMIN (GLUCOPHAGE) 500 MG tablet     Semaglutide 3 MG TABS     No current facility-administered medications for this visit.         Review of Systems   Constitutional, HEENT, cardiovascular, pulmonary, gi and gu systems are negative, except as otherwise noted.      Objective    /75 (BP Location: Left arm, Patient Position: Sitting, Cuff Size: Adult Regular)   Pulse 92   Resp 16   Ht 1.632 m (5' 4.25\")   Wt 82.1 kg (181 lb)   LMP  (LMP Unknown)   SpO2 " 97%   BMI 30.83 kg/m    Body mass index is 30.83 kg/m .  Physical Exam   GENERAL: healthy, alert and no distress    Results for orders placed or performed in visit on 05/08/23 (from the past 24 hour(s))   HEMOGLOBIN A1C   Result Value Ref Range    Hemoglobin A1C 8.4 (H) 0.0 - 5.6 %

## 2023-05-11 DIAGNOSIS — E78.5 HYPERLIPIDEMIA, UNSPECIFIED HYPERLIPIDEMIA TYPE: ICD-10-CM

## 2023-05-11 DIAGNOSIS — E11.9 TYPE 2 DIABETES MELLITUS WITHOUT COMPLICATION, WITHOUT LONG-TERM CURRENT USE OF INSULIN (H): ICD-10-CM

## 2023-05-12 RX ORDER — ATORVASTATIN CALCIUM 40 MG/1
40 TABLET, FILM COATED ORAL AT BEDTIME
Qty: 90 TABLET | Refills: 2 | Status: SHIPPED | OUTPATIENT
Start: 2023-05-12 | End: 2024-02-07

## 2023-05-12 NOTE — TELEPHONE ENCOUNTER
"    Last Written Prescription Date:  12/21/2022  Last Fill Quantity: 90,  # refills: 1   Last office visit provider:  5/8/2023     Requested Prescriptions   Pending Prescriptions Disp Refills     atorvastatin (LIPITOR) 40 MG tablet 90 tablet 1     Sig: Take 1 tablet (40 mg) by mouth At Bedtime       Statins Protocol Passed - 5/11/2023  3:09 PM        Passed - LDL on file in past 12 months     Recent Labs   Lab Test 02/06/23  0900   LDL 98             Passed - No abnormal creatine kinase in past 12 months     No lab results found.             Passed - Recent (12 mo) or future (30 days) visit within the authorizing provider's specialty     Patient has had an office visit with the authorizing provider or a provider within the authorizing providers department within the previous 12 mos or has a future within next 30 days. See \"Patient Info\" tab in inbasket, or \"Choose Columns\" in Meds & Orders section of the refill encounter.              Passed - Medication is active on med list        Passed - Patient is age 18 or older        Passed - No active pregnancy on record        Passed - No positive pregnancy test in past 12 months            Routing to provider as this went through protocols and shows that it is not on the [facility] list to approve.          metFORMIN (GLUCOPHAGE) 500 MG tablet 360 tablet 1     Sig: Take 2 tablets (1,000 mg) by mouth 2 times daily (with meals)       There is no refill protocol information for this order          Belia Guadalupe RN 05/12/23 1:53 PM  "

## 2023-06-09 ENCOUNTER — TRANSFERRED RECORDS (OUTPATIENT)
Dept: HEALTH INFORMATION MANAGEMENT | Facility: CLINIC | Age: 68
End: 2023-06-09
Payer: COMMERCIAL

## 2023-06-09 LAB — RETINOPATHY: NEGATIVE

## 2023-07-13 ENCOUNTER — MYC MEDICAL ADVICE (OUTPATIENT)
Dept: FAMILY MEDICINE | Facility: CLINIC | Age: 68
End: 2023-07-13
Payer: COMMERCIAL

## 2023-07-13 DIAGNOSIS — E11.9 TYPE 2 DIABETES MELLITUS WITHOUT COMPLICATION, WITHOUT LONG-TERM CURRENT USE OF INSULIN (H): ICD-10-CM

## 2023-07-13 NOTE — TELEPHONE ENCOUNTER
"Patient sent in CRM Request message 7/13/23 with following text:  \"  Topic: Non-Medical Question.     I have four day left of metformin and my appointment is august 7. Can you call in a refill  Thanks Kathy\"    Please review RX and approve if appropriate.  "

## 2023-07-14 NOTE — PROGRESS NOTES
AUDIOLOGY REPORT     SUMMARY: Audiology visit completed. See audiogram for results.       RECOMMENDATIONS: Follow-up with ENT.    Castillo Kaplan, CCC-A  Doctor of Audiology  [place] Licensed Audiologist #[id number]

## 2023-07-17 ENCOUNTER — OFFICE VISIT (OUTPATIENT)
Dept: OTOLARYNGOLOGY | Facility: CLINIC | Age: 68
End: 2023-07-17
Payer: COMMERCIAL

## 2023-07-17 ENCOUNTER — OFFICE VISIT (OUTPATIENT)
Dept: AUDIOLOGY | Facility: CLINIC | Age: 68
End: 2023-07-17
Payer: COMMERCIAL

## 2023-07-17 DIAGNOSIS — H90.5 SENSORINEURAL HEARING LOSS (SNHL), UNSPECIFIED LATERALITY: Primary | ICD-10-CM

## 2023-07-17 DIAGNOSIS — H90.5 SENSORINEURAL HEARING LOSS (SNHL), UNSPECIFIED LATERALITY: ICD-10-CM

## 2023-07-17 DIAGNOSIS — J32.9 CHRONIC CONGESTION OF PARANASAL SINUS: Primary | ICD-10-CM

## 2023-07-17 DIAGNOSIS — H90.42 SENSORINEURAL HEARING LOSS (SNHL) OF LEFT EAR WITH UNRESTRICTED HEARING OF RIGHT EAR: Primary | ICD-10-CM

## 2023-07-17 DIAGNOSIS — H81.02 COCHLEAR HYDROPS OF LEFT EAR: ICD-10-CM

## 2023-07-17 PROCEDURE — 92567 TYMPANOMETRY: CPT | Performed by: AUDIOLOGIST

## 2023-07-17 PROCEDURE — 99214 OFFICE O/P EST MOD 30 MIN: CPT | Performed by: OTOLARYNGOLOGY

## 2023-07-17 PROCEDURE — 92557 COMPREHENSIVE HEARING TEST: CPT | Performed by: AUDIOLOGIST

## 2023-07-17 NOTE — LETTER
7/17/2023         RE: Kathy Posada  [address]  [place] [facility] [facility] [address]        Dear Colleague,    Thank you for referring your patient, Kathy Posada, to the [facility] [place]. Please see a copy of my visit note below.    CHIEF COMPLAINT:  Patient presents with:  Follow Up: Audio review, after the injection her ear felt open then the plugged feeling came back and seems stuffed up all the time, the nasal spray made her really tired and groggy in the morning she used this for a couple weeks and it did not seem to help so she stopped this. Has static sound in the left ear all the time          HISTORY OF PRESENT ILLNESS    Kathy was seen in follow up after previous 4/17/2023 visit for AUDIOGRAM review after TT Dex injection into the LEFT ear for cochlear hydrops on 4/17/2023.      AUDIOLOGY NOTE:    HISTORY: Hx of unilateral hearing loss, aural fullness, and constant static noise in L ear beginning in summer 2022. Last audio 03/22/23  showed right normal hearing and left moderate sensorineural hearing loss rising to normal hearing. Underwent dexamethasone injection  left 04/17/23. Today, reports relief from fullness for a couple days after the injection, but since then has returned and potentially been  worse. No subjective improvement in hearing. Denies otalgia, otorrhea, dizziness, ear surgery, noise exposure, and fam hx of HL.  RESULTS: Otoscopy: Clear bilat. Tymps: Reduced mobility, bilat. Reflexes: CNT due to equipment limitations. Audio: Right normal hearing.  Left moderate rising to mild SNHL. Thresholds 10-15 dBHL worse left at 5743-4493 Hz. Word Rec: 96% right and 92% left.  REC: To ENT as planned. Recheck per ENT.        REVIEW OF SYSTEMS    Review of Systems: a 10-system review is reviewed at this encounter.  See scanned document.       Allergies   Allergen Reactions     Amoxicillin Rash     Broke out in a rash after a round of this            PHYSICAL EXAM:        HEAD: Normal  appearance and symmetry:  No cutaneous lesions.      EARS:          LEFT:    TM intacct  NOSE:    Dorsum:   straight       ORAL CAVITY/OROPHARYNX:    Lips:  Normal.     NECK:  Trachea:  midline     NEURO:   Alert and Oriented    GAIT AND STATION:  normal     RESPIRATORY:   Symmetry and Respiratory effort    PSYCH:   normal mood and affect    SKIN:  warm and dry     AUDIOGRAM:  10 dB decrease in left ear versus previous 1-8 K hz    IMPRESSION:   Encounter Diagnoses   Name Primary?     Chronic congestion of paranasal sinus Yes     Cochlear hydrops of left ear             RECOMMENDATIONS:    Orders Placed This Encounter   Procedures     Adult Allergy/Asthma Referral     Adult Audiology  Referral      No orders of the defined types were placed in this encounter.  She is medically cleared for hearing aid(s).   Return visit 6 months with repeat audiogram.           Again, thank you for allowing me to participate in the care of your patient.        Sincerely,        Sandor Mcnally MD

## 2023-07-17 NOTE — PROGRESS NOTES
CHIEF COMPLAINT:  Patient presents with:  Follow Up: Audio review, after the injection her ear felt open then the plugged feeling came back and seems stuffed up all the time, the nasal spray made her really tired and groggy in the morning she used this for a couple weeks and it did not seem to help so she stopped this. Has static sound in the left ear all the time          HISTORY OF PRESENT ILLNESS    Kathy was seen in follow up after previous 4/17/2023 visit for AUDIOGRAM review after TT Dex injection into the LEFT ear for cochlear hydrops on 4/17/2023.      AUDIOLOGY NOTE:    HISTORY: Hx of unilateral hearing loss, aural fullness, and constant static noise in L ear beginning in summer 2022. Last audio 03/22/23  showed right normal hearing and left moderate sensorineural hearing loss rising to normal hearing. Underwent dexamethasone injection  left 04/17/23. Today, reports relief from fullness for a couple days after the injection, but since then has returned and potentially been  worse. No subjective improvement in hearing. Denies otalgia, otorrhea, dizziness, ear surgery, noise exposure, and fam hx of HL.  RESULTS: Otoscopy: Clear bilat. Tymps: Reduced mobility, bilat. Reflexes: CNT due to equipment limitations. Audio: Right normal hearing.  Left moderate rising to mild SNHL. Thresholds 10-15 dBHL worse left at 8838-0075 Hz. Word Rec: 96% right and 92% left.  REC: To ENT as planned. Recheck per ENT.        REVIEW OF SYSTEMS    Review of Systems: a 10-system review is reviewed at this encounter.  See scanned document.       Allergies   Allergen Reactions     Amoxicillin Rash     Broke out in a rash after a round of this            PHYSICAL EXAM:        HEAD: Normal appearance and symmetry:  No cutaneous lesions.      EARS:          LEFT:    TM intacct  NOSE:    Dorsum:   straight       ORAL CAVITY/OROPHARYNX:    Lips:  Normal.     NECK:  Trachea:  midline     NEURO:   Alert and Oriented    GAIT AND STATION:   normal     RESPIRATORY:   Symmetry and Respiratory effort    PSYCH:   normal mood and affect    SKIN:  warm and dry     AUDIOGRAM:  10 dB decrease in left ear versus previous 1-8 K hz    IMPRESSION:   Encounter Diagnoses   Name Primary?     Chronic congestion of paranasal sinus Yes     Cochlear hydrops of left ear             RECOMMENDATIONS:    Orders Placed This Encounter   Procedures     Adult Allergy/Asthma Referral     Adult Audiology  Referral      No orders of the defined types were placed in this encounter.  She is medically cleared for hearing aid(s).   Return visit 6 months with repeat audiogram.

## 2023-07-28 ENCOUNTER — TELEPHONE (OUTPATIENT)
Dept: AUDIOLOGY | Facility: CLINIC | Age: 68
End: 2023-07-28
Payer: COMMERCIAL

## 2023-07-28 NOTE — TELEPHONE ENCOUNTER
Left message for patient explaining some details about insurance coverage for hearing aids but that I'm happy to go over it all at her appointment on 7/31/2023.

## 2023-08-07 ENCOUNTER — OFFICE VISIT (OUTPATIENT)
Dept: FAMILY MEDICINE | Facility: CLINIC | Age: 68
End: 2023-08-07
Payer: COMMERCIAL

## 2023-08-07 VITALS
SYSTOLIC BLOOD PRESSURE: 139 MMHG | HEART RATE: 96 BPM | HEIGHT: 65 IN | OXYGEN SATURATION: 97 % | DIASTOLIC BLOOD PRESSURE: 84 MMHG | BODY MASS INDEX: 30.02 KG/M2 | RESPIRATION RATE: 16 BRPM | WEIGHT: 180.2 LBS

## 2023-08-07 DIAGNOSIS — E66.9 OBESITY (BMI 30-39.9): ICD-10-CM

## 2023-08-07 DIAGNOSIS — E11.9 TYPE 2 DIABETES MELLITUS WITHOUT COMPLICATION, WITHOUT LONG-TERM CURRENT USE OF INSULIN (H): Primary | ICD-10-CM

## 2023-08-07 DIAGNOSIS — E78.5 HYPERLIPIDEMIA, UNSPECIFIED HYPERLIPIDEMIA TYPE: ICD-10-CM

## 2023-08-07 DIAGNOSIS — N90.4 LICHEN SCLEROSUS OF FEMALE GENITALIA: ICD-10-CM

## 2023-08-07 LAB
HBA1C MFR BLD: 8.3 % (ref 0–5.6)
HOLD SPECIMEN: NORMAL

## 2023-08-07 PROCEDURE — 36415 COLL VENOUS BLD VENIPUNCTURE: CPT | Performed by: FAMILY MEDICINE

## 2023-08-07 PROCEDURE — 83036 HEMOGLOBIN GLYCOSYLATED A1C: CPT | Performed by: FAMILY MEDICINE

## 2023-08-07 PROCEDURE — 99214 OFFICE O/P EST MOD 30 MIN: CPT | Performed by: FAMILY MEDICINE

## 2023-08-07 RX ORDER — CLOBETASOL PROPIONATE 0.5 MG/G
OINTMENT TOPICAL
Qty: 60 G | Refills: 1 | Status: SHIPPED | OUTPATIENT
Start: 2023-08-07 | End: 2024-09-12

## 2023-08-07 NOTE — PROGRESS NOTES
Assessment & Plan     ICD-10-CM    1. Type 2 diabetes mellitus without complication, without long-term current use of insulin (H)  E11.9 HEMOGLOBIN A1C     HEMOGLOBIN A1C     AMB Adult Diabetes Educator Referral      2. Lichen sclerosus of female genitalia  N90.4 clobetasol (TEMOVATE) 0.05 % external ointment      3. Hyperlipidemia, unspecified hyperlipidemia type  E78.5       4. Obesity (BMI 30-39.9)  E66.9         Patient is here today for a follow-up.  Following issues addressed  1: Type 2 diabetes: Remains under suboptimal control.  She is on metformin and semaglutide.  She is trying for lifestyle modification but admits that her weakness is eating.  She has not followed with diabetes educator and will help schedule an appointment.  She would like to make lifestyle changes and this is reasonable  2: Lichen sclerosus: Like her clobetasol refill that has been provided  3: Obesity: Weight is again up.  In the past she has been able to reduce weight with dietary modification and I would recommend.  4: Hyperlipidemia: Takes her statin regularly.     MEDICATIONS:  Continue current medications without change  CONSULTATION/REFERRAL to diabetes educator  SELF MONITORING:       - Please check blood glucose readings daily    Mana Lynch MD  [facility]    Bolivar Chavez is a 68 year old, presenting for the following health issues:  Diabetes (Pt is fasting today, no concerns today. )        8/7/2023     9:34 AM   Additional Questions   Roomed by Yelena Arreola CMA   Accompanied by N/A       History of Present Illness       Diabetes:   She presents for follow up of diabetes.  She is checking home blood glucose one time daily.   She checks blood glucose before meals.  Blood glucose is never over 200 and never under 70. She is aware of hypoglycemia symptoms including none.    She has no concerns regarding her diabetes at this time.   She is not experiencing numbness or burning in feet,  "excessive thirst, blurry vision, weight changes or redness, sores or blisters on feet.           She eats 2-3 servings of fruits and vegetables daily.She consumes 0 sweetened beverage(s) daily.She exercises with enough effort to increase her heart rate 10 to 19 minutes per day.  She exercises with enough effort to increase her heart rate 3 or less days per week.   She is taking medications regularly.     Patient Active Problem List   Diagnosis    Hyperlipidemia    Diabetes type 2, controlled (H)    Obesity (BMI 30-39.9)    Lichen sclerosus of female genitalia    Controlled type 2 diabetes mellitus with hyperglycemia, without long-term current use of insulin (H)    Type 2 diabetes mellitus without complication, without long-term current use of insulin (H)     Current Outpatient Medications   Medication    atorvastatin (LIPITOR) 40 MG tablet    clobetasol (TEMOVATE) 0.05 % external ointment    metFORMIN (GLUCOPHAGE) 500 MG tablet    Semaglutide 3 MG TABS     No current facility-administered medications for this visit.       Review of Systems   Constitutional, HEENT, cardiovascular, pulmonary, gi and gu systems are negative, except as otherwise noted.      Objective    /84 (BP Location: Left arm, Patient Position: Sitting, Cuff Size: Adult Large)   Pulse 96   Resp 16   Ht 1.638 m (5' 4.5\")   Wt 81.7 kg (180 lb 3.2 oz)   LMP  (LMP Unknown)   SpO2 97%   BMI 30.45 kg/m    Body mass index is 30.45 kg/m .  Physical Exam   GENERAL: healthy, alert and no distress    Results for orders placed or performed in visit on 08/07/23 (from the past 24 hour(s))   HEMOGLOBIN A1C   Result Value Ref Range    Hemoglobin A1C 8.3 (H) 0.0 - 5.6 %   Extra Tube    Narrative    The following orders were created for panel order Extra Tube.  Procedure                               Abnormality         Status                     ---------                               -----------         ------                     Extra Green Top " (Lithium...[[id number]]                      In process                   Please view results for these tests on the individual orders.

## 2023-09-27 ENCOUNTER — ALLIED HEALTH/NURSE VISIT (OUTPATIENT)
Dept: EDUCATION SERVICES | Facility: CLINIC | Age: 68
End: 2023-09-27
Payer: COMMERCIAL

## 2023-09-27 VITALS — BODY MASS INDEX: 29.57 KG/M2 | WEIGHT: 175 LBS

## 2023-09-27 DIAGNOSIS — E11.9 TYPE 2 DIABETES MELLITUS WITHOUT COMPLICATION, WITHOUT LONG-TERM CURRENT USE OF INSULIN (H): ICD-10-CM

## 2023-09-27 PROCEDURE — G0108 DIAB MANAGE TRN  PER INDIV: HCPCS

## 2023-09-27 NOTE — LETTER
9/27/2023         RE: Kathy Posada  [address] [place] [address]        Dear Colleague,    Thank you for referring your patient, Kathy Posada, to the [facility]. Please see a copy of my visit note below.    Diabetes Self-Management Education & Support    Presents for: Individual review    Type of Service: In Person Visit    Assessment Type:   ASSESSMENT:  Kathy is a very pleasant 68-year-old who comes to clinic today for consult regarding her current diabetes self-management skills with an A1c not meeting ADA goal.  She arrived today unaccompanied.  Per chart review prior to our visit noted she had originally been diagnosed in 2017 diabetes educator at that time.  During the course of her initial conversation today it was noted that Kathy actually knew very little about diabetes itself or how to go about managing at.  Based upon this I asked her if she would be interested in treating today's visit as it diagnosis and she was receiving initial education.  She said she would like much, so that is how we proceed today.  Medications were reviewed and she confirms she is currently taking 2000 mg p.o. daily 3 mg Rybelsus p.o. daily.  She stated the Rybelsus is very expensive and is wondering if there is perhaps an alternative that is less expensive.  I questioned why the dose of Rybelsus had never been increased-thinking there probably had been no follow-up or blood glucose review after prescribed.  I did provide her with a list of alternative medications for her to check on co-pay cost.  Kathy is retired,  and lives in a house with her .  [place]  Prior to her visit today I did review her most recent office visit notes as well as lab results.  Kathy was very receptive to all the information/education that was provided to her today.    Patient's most recent   Lab Results   Component Value Date    A1C 8.3 08/07/2023      is not meeting goal of  7-7.5    Diabetes knowledge and skills assessment:       Continue education with the following diabetes management concepts: Healthy Eating, Being Active, Taking Medication, Reducing Risks, and Healthy Coping    Based on learning assessment above, most appropriate setting for further diabetes education would be: Individual setting.      PLAN  1. Eat 3 balanced meals each day - Monitor carb intake and limit to 45-60 grams per meal  This would be equal to 3-4 choices ~  1 choice = 15 grams    Do not wait longer than 4-5 hours to eat something  Snacks limit to no more than 30 grams of carbohydrates or 2 choices  Make sure you include protein source with each meal and at bedtime - this has been shown to help with blood glucose elevations    2.  Check blood sugars once each day - please try and alternate the times you check between am fast ing( right after you wake before eating) and 2 hours AFTER dinner - this allows us to get a better idea of what is happening throughout your day , not at just one time     Fasting and before meal target is 80 - 130   2 hours after a meal target is < 180  remember to bring meter and log book to all appointments    3. Incorporate 30 minutes activity into each day - does not need to be all at one time & walking counts    4. Take diabetes medications as prescribed Continue Metformin 2000 mg daily and  the Rybelsus  3 mg   Patient reports today the cost of Rybelsus is $300 monthly and she is finding it not very financially feasible.  Informed her that we will work with her in trying to find an alternative that better fits her budget  Look into cost of theses meds  Sergei Doran    Topics to cover at upcoming visits: Healthy Eating, Being Active, Monitoring, Taking Medication, Reducing Risks, and Healthy Coping    Follow-up: 11/1/23    See Care Plan for co-developed, patient-state behavior change goals.  AVS provided for patient  "today.    Education Materials Provided:  Living Healthy with Diabetes, BG Log Sheet, Carbohydrate Counting, and My Plate Planner      SUBJECTIVE/OBJECTIVE:  Presents for: Individual review  Accompanied by: Self  Diabetes education in the past 24 mo: No (LV 2017)  Diabetes type: Type 2  Date of diagnosis: 2017  Disease course: Getting harder to manage  How confident are you filling out medical forms by yourself:: Extremely  Other concerns:: None  Cultural Influences/Ethnic Background:  Not  or       Diabetes Symptoms & Complications:  Fatigue: Sometimes  Neuropathy: No  Polydipsia: No  Polyphagia: No  Polyuria: No  Visual change: No  Slow healing wounds: No  Symptom course: Stable  Weight trend: Decreasing  Complications assessed today?: Yes  Autonomic neuropathy: No  CVA: No  Heart disease: No  Nephropathy: No  Peripheral neuropathy: No  Peripheral Vascular Disease: No  Retinopathy: No  Sexual dysfunction: No    Patient Problem List and Family Medical History reviewed for relevant medical history, current medical status, and diabetes risk factors.    Vitals:  Wt 79.4 kg (175 lb)   LMP  (LMP Unknown)   BMI 29.57 kg/m    Estimated body mass index is 29.57 kg/m  as calculated from the following:    Height as of 8/7/23: 1.638 m (5' 4.5\").    Weight as of this encounter: 79.4 kg (175 lb).   Last 3 BP:   BP Readings from Last 3 Encounters:   08/07/23 139/84   05/08/23 138/75   02/06/23 125/68       History   Smoking Status     Never   Smokeless Tobacco     Never       Labs:  Lab Results   Component Value Date    A1C 8.3 08/07/2023     Lab Results   Component Value Date     02/06/2023     03/22/2022     Lab Results   Component Value Date    LDL 98 02/06/2023     Direct Measure HDL   Date Value Ref Range Status   02/06/2023 58 >=50 mg/dL Final   ]  GFR Estimate   Date Value Ref Range Status   02/06/2023 71 >60 mL/min/1.73m2 Final     Comment:     eGFR calculated using 2021 CKD-EPI equation. "   08/27/2020 >60 >60 mL/min/1.73m2 Final     GFR Estimate If Black   Date Value Ref Range Status   08/27/2020 >60 >60 mL/min/1.73m2 Final     Lab Results   Component Value Date    CR 0.89 02/06/2023     No results found for: MICROALBUMIN    Healthy Eating:  Cultural/Voodoo diet restrictions?: No  Meal planning/habits: Avoiding sweets, Low salt, Smaller portions  How many times a week on average do you eat food made away from home (restaurant/take-out)?: 3  Meals include: Lunch, Dinner, Evening Snack  Breakfast: cereal - [employer] or oatmeal with a bit of Honey or Mata latte from [employer] ( [employer]) and [place]  Lunch: cup of soup or 1/2 sand or leftovers  Dinner: + protein + vegetable + starch  Snacks: kind bars, chips , sometimes ice cream in evening with   Other: lots of food and DM guilt - self blame  Beverages: Water, Tea, Coffee drinks  Has patient met with a dietitian in the past?: No    Being Active:  Being Active Assessed Today: Yes  Exercise:: Yes  Days per week of moderate to strenuous exercise (like a brisk walk): 2 (walking in mall once a week - has pool at home and will swim regularly in months weather permits)  On average, minutes per day of exercise at this level: 20  How intense was your typical exercise? : Light (like stretching or slow walking)  Exercise Minutes per Week: 40  Barrier to exercise: None    Monitoring:  Monitoring Assessed Today: Yes  Did patient bring glucose meter to appointment? : No  Blood Glucose Meter: Contour Next  Times checking blood sugar at home (number): 2  Times checking blood sugar at home (per): Week (am fasting)  Blood glucose trend: No change    Currently Kathy has not been checking her blood glucose intermittently.  Today's reading was 150 mg/dL.  States that this was primarily due to the fact that she had no idea what numbers she was looking for or In the targets for those times.  This was all explained and clarified for her today in forward she will test once  daily but will alternate times between a.m. fasting and 2 hours postprandial.    Taking Medications:  Diabetes Medication(s)       Biguanides       metFORMIN (GLUCOPHAGE) 500 MG tablet    Take 2 tablets (1,000 mg) by mouth 2 times daily (with meals)      Incretin Mimetic Agents       Semaglutide 3 MG TABS    Take 1 tablet by mouth daily            Taking Medication Assessed Today: Yes  Current Treatments: Oral Medication (taken by mouth)  Problems taking diabetes medications regularly?: No (cost is a barrier- Rybelsus is expensive)  Diabetes medication side effects?: No    Problem Solving:  Problem Solving Assessed Today: Yes  Is the patient at risk for hypoglycemia?: No  Is the patient at risk for DKA?: No  Does patient have severe weather/disaster plan for diabetes management?: Not Needed  Does patient have sick day plan for diabetes management?: Not Needed              Reducing Risks:  Reducing Risks Assessed Today: Yes  Diabetes Risks: Age over 45 years, Sedentary Lifestyle, Hyperlipidemia  CAD Risks: Diabetes Mellitus, Obesity, Stress, Post-menopausal, Sedentary lifestyle, Dyslipidemia  Has dilated eye exam at least once a year?: Yes  Sees dentist every 6 months?: Yes  Feet checked by healthcare provider in the last year?: Yes    Healthy Coping:  Healthy Coping Assessed Today: Yes  Emotional response to diabetes: Guilt/Self-blame  Informal Support system:: Spouse  Stage of change: PREPARATION (Decided to change - considering how)  Support resources: In-person Offerings  Patient Activation Measure Survey Score:       No data to display                  Care Plan and Education Provided:  Care Plan: Diabetes   Updates made by Kandace Leon RN since 9/28/2023 12:00 AM        Problem: HbA1C Not In Goal         Goal: Establish Regular Follow-Ups with PCP    This Visit's Progress: 100%   Note:    Follow-up appointments in place for both PCP and [facility]       Task: Discuss with PCP the recommended timing for  patient's next follow up visit(s) Completed 9/28/2023   Responsible User: Kandace Leon RN        Task: Discuss schedule for PCP visits with patient Completed 9/28/2023   Responsible User: Kandace Leon RN        Goal: Get HbA1C Level in Goal    This Visit's Progress: 60%   Note:    Current A1c 8.3% 8/7/2023       Task: Educate patient on diabetes education self-management topics Completed 9/28/2023   Responsible User: Kandace Leon RN        Task: Educate patient on benefits of regular glucose monitoring Completed 9/28/2023   Responsible User: Kandace Leon RN        Task: Refer patient to appropriate extended care team member, as needed (Medication Therapy Management, Behavioral Health, Physical Therapy, etc.)    Responsible User: Kandace Leon RN        Task: Discuss diabetes treatment plan with patient Completed 9/28/2023   Responsible User: Kandace Leon RN        Problem: Diabetes Self-Management Education Needed to Optimize Self-Care Behaviors         Goal: Understand diabetes pathophysiology and disease progression    This Visit's Progress: 100%   Note:    Completed at visit today       Task: Provide education on diabetes pathophysiology and disease progression specfic to patient's diabetes type Completed 9/28/2023   Responsible User: Kandace Leon RN        Goal: Healthy Eating - follow a healthy eating pattern for diabetes    This Visit's Progress: 60%        Task: Provide education on portion control and consistency in amount, composition and timing of food intake Completed 9/28/2023   Responsible User: Kandace Leon RN        Task: Provide education on managing carbohydrate intake (carbohydrate counting, plate planning method, etc.) Completed 9/28/2023   Responsible User: Kandace Leon RN        Task: Provide education on weight management    Responsible User: Kandace Leon RN        Task: Provide education on heart healthy eating    Responsible User: Carolyn  Kandace DEVI RN        Task: Provide education on eating out Completed 9/28/2023   Responsible User: Kandace Leon RN        Task: Develop individualized healthy eating plan with patient Completed 9/28/2023   Responsible User: Kandace Leon RN        Goal: Being Active - get regular physical activity, working up to at least 150 minutes per week    This Visit's Progress: 50%        Task: Provide education on relationship of activity to glucose and precautions to take if at risk for low glucose Completed 9/28/2023   Responsible User: Kandace Leon RN        Task: Discuss barriers to physical activity with patient Completed 9/28/2023   Responsible User: Kandace Leon RN        Task: Develop physical activity plan with patient Completed 9/28/2023   Responsible User: Kandace Leon RN        Task: Explore community resources including walking groups, assistance programs, and home videos    Responsible User: Kandace Leon RN        Goal: Monitoring - monitor glucose and ketones as directed    This Visit's Progress: 30%        Task: Provide education on blood glucose monitoring (purpose, proper technique, frequency, glucose targets, interpreting results, when to use glucose control solution, sharps disposal) Completed 9/28/2023   Responsible User: Kandace Leon RN        Task: Provide education on continuous glucose monitoring (sensor placement, use of yadi or /reader, understanding glucose trends, alerts and alarms, differences between sensor glucose and blood glucose)    Responsible User: Kandace Leon RN        Task: Provide education on ketone monitoring (when to monitor, frequency, etc.)    Responsible User: Kandace Leon RN        Goal: Taking Medication - patient is consistently taking medications as directed    This Visit's Progress: 80%   Note:    Current medications there were no missed or skipped doses, however dose of Rybelsus had never been increased past 3 mg.  Patient  reported this medication is very expensive for her.  Would like to work on trying to find an alternative       Task: Provide education on action of prescribed medication, including when to take and possible side effects Completed 9/28/2023   Responsible User: Kandace Leon RN        Task: Provide education on insulin and injectable diabetes medications, including administration, storage, site selection and rotation for injection sites    Responsible User: Kandace Leon RN   Note:    Injectables were discussed with patient today as a possible alternative to Rybelsus       Task: Discuss barriers to medication adherence with patient and provide management technique ideas as appropriate    Responsible User: Kandace Leon RN        Task: Provide education on frequency and refill details of medications    Responsible User: Kandace Leon RN        Goal: Problem Solving - know how to prevent and manage short-term diabetes complications         Task: Provide education on high blood glucose - causes, signs/symptoms, prevention and treatment    Responsible User: Kandace Leon RN        Task: Provide education on low blood glucose - causes, signs/symptoms, prevention, treatment, carrying a carbohydrate source at all times, and medical identification    Responsible User: Kandace Leon RN        Task: Provide education on safe travel with diabetes    Responsible User: Kandace Leon RN        Task: Provide education on how to care for diabetes on sick days    Responsible User: Kandace Leon RN        Task: Provide education on when to call a health care provider    Responsible User: Kandace Leon RN        Goal: Reducing Risks - know how to prevent and treat long-term diabetes complications    This Visit's Progress: 60%        Task: Provide education on major complications of diabetes, prevention, early diagnostic measures and treatment of complications Completed 9/28/2023   Responsible User:  Kandace Leon RN        Task: Provide education on recommended care for dental, eye and foot health    Responsible User: Kandace Leon RN        Task: Provide education on Hemoglobin A1c - goals and relationship to blood glucose levels Completed 9/28/2023   Responsible User: Kandace Leon RN        Task: Provide education on recommendations for heart health - lipid levels and goals, blood pressure and goals, and aspirin therapy, if indicated    Responsible User: Kandace Leon RN        Task: Provide education on tobacco cessation    Responsible User: Kandace Leon RN        Goal: Healthy Coping - use available resources to cope with the challenges of managing diabetes    This Visit's Progress: 50%        Task: Discuss recognizing feelings about having diabetes Completed 9/28/2023   Responsible User: Kandace Leon RN   Note:    Continue to work on patient's feelings of guilt and self blame regarding her diagnosis and management       Task: Provide education on the benefits of making appropriate lifestyle changes Completed 9/28/2023   Responsible User: Kandace Leon RN        Task: Provide education on benefits of utilizing support systems Completed 9/28/2023   Responsible User: Kandace Leon RN        Task: Discuss methods for coping with stress    Responsible User: Kandace Leon RN        Task: Provide education on when to seek professional counseling    Responsible User: Kandace Leon RN          Thank you,  Kandace Leon RN [facility]  Certified Diabetes Care and   Visit type : DSMT      Time Spent: 60 minutes  Encounter Type: Individual    Any diabetes medication dose changes were made via the CDE Protocol per the patient's referring provider and primary care provider. A copy of this encounter was shared with the provider.     Much or all of the text in this note was generated through the use of the Dragon Dictate voice-to-text software.Errors in spelling or  words which seem out of context are unintentional. Sound alike errors, in particular, may have escaped editing.

## 2023-09-27 NOTE — PATIENT INSTRUCTIONS
1. Eat 3 balanced meals each day - Monitor carb intake and limit to 45-60 grams per meal  This would be equal to 3-4 choices ~  1 choice = 15 grams    Do not wait longer than 4-5 hours to eat something  Snacks limit to no more than 30 grams of carbohydrates or 2 choices  Make sure you include protein source with each meal and at bedtime - this has been shown to help with blood glucose elevations    2.  Check blood sugars once each day - please try and alternate the times you check between am fast ing( right after you wake before eating) and 2 hours AFTER dinner - this allows us to get a better idea of what is happening throughout your day , not at just one time     Fasting and before meal target is 80 - 130   2 hours after a meal target is < 180  remember to bring meter and log book to all appointments    3. Incorporate 30 minutes activity into each day - does not need to be all at one time & walking counts    4. Take diabetes medications as prescribed Continue Metformin 2000 mg daily and  the Rybelsus  3 mg     Look into cost of theses meds  Sergei Doran      Thank you for coming in to see me today !      I value your experience and would be very thankful for your time in providing feedback in our clinic survey.    You may receive an e-mail, text message or even something in the mail from [facility].  This is a survey to let us know how we are doing - the survey will be related to your diabetes education and me.

## 2023-09-28 NOTE — PROGRESS NOTES
Diabetes Self-Management Education & Support    Presents for: Individual review    Type of Service: In Person Visit    Assessment Type:   ASSESSMENT:  Kathy is a very pleasant 68-year-old who comes to clinic today for consult regarding her current diabetes self-management skills with an A1c not meeting ADA goal.  She arrived today unaccompanied.  Per chart review prior to our visit noted she had originally been diagnosed in 2017 diabetes educator at that time.  During the course of her initial conversation today it was noted that Kathy actually knew very little about diabetes itself or how to go about managing at.  Based upon this I asked her if she would be interested in treating today's visit as it diagnosis and she was receiving initial education.  She said she would like much, so that is how we proceed today.  Medications were reviewed and she confirms she is currently taking 2000 mg p.o. daily 3 mg Rybelsus p.o. daily.  She stated the Rybelsus is very expensive and is wondering if there is perhaps an alternative that is less expensive.  I questioned why the dose of Rybelsus had never been increased-thinking there probably had been no follow-up or blood glucose review after prescribed.  I did provide her with a list of alternative medications for her to check on co-pay cost.  Kathy is retired,  and lives in a house with her .  [place]  Prior to her visit today I did review her most recent office visit notes as well as lab results.  Kathy was very receptive to all the information/education that was provided to her today.    Patient's most recent   Lab Results   Component Value Date    A1C 8.3 08/07/2023     is not meeting goal of  7-7.5    Diabetes knowledge and skills assessment:       Continue education with the following diabetes management concepts: Healthy Eating, Being Active, Taking Medication, Reducing Risks, and Healthy Coping    Based on learning  assessment above, most appropriate setting for further diabetes education would be: Individual setting.      PLAN  1. Eat 3 balanced meals each day - Monitor carb intake and limit to 45-60 grams per meal  This would be equal to 3-4 choices ~  1 choice = 15 grams    Do not wait longer than 4-5 hours to eat something  Snacks limit to no more than 30 grams of carbohydrates or 2 choices  Make sure you include protein source with each meal and at bedtime - this has been shown to help with blood glucose elevations    2.  Check blood sugars once each day - please try and alternate the times you check between am fast ing( right after you wake before eating) and 2 hours AFTER dinner - this allows us to get a better idea of what is happening throughout your day , not at just one time     Fasting and before meal target is 80 - 130   2 hours after a meal target is < 180  remember to bring meter and log book to all appointments    3. Incorporate 30 minutes activity into each day - does not need to be all at one time & walking counts    4. Take diabetes medications as prescribed Continue Metformin 2000 mg daily and  the Rybelsus  3 mg   Patient reports today the cost of Rybelsus is $300 monthly and she is finding it not very financially feasible.  Informed her that we will work with her in trying to find an alternative that better fits her budget  Look into cost of theses meds  Sergei Doran    Topics to cover at upcoming visits: Healthy Eating, Being Active, Monitoring, Taking Medication, Reducing Risks, and Healthy Coping    Follow-up: 11/1/23    See Care Plan for co-developed, patient-state behavior change goals.  AVS provided for patient today.    Education Materials Provided:  Living Healthy with Diabetes, BG Log Sheet, Carbohydrate Counting, and My Plate Planner      SUBJECTIVE/OBJECTIVE:  Presents for: Individual review  Accompanied by: Self  Diabetes education in the past 24 mo: No ([facility]ITUS  "2017)  Diabetes type: Type 2  Date of diagnosis: 2017  Disease course: Getting harder to manage  How confident are you filling out medical forms by yourself:: Extremely  Other concerns:: None  Cultural Influences/Ethnic Background:  Not  or       Diabetes Symptoms & Complications:  Fatigue: Sometimes  Neuropathy: No  Polydipsia: No  Polyphagia: No  Polyuria: No  Visual change: No  Slow healing wounds: No  Symptom course: Stable  Weight trend: Decreasing  Complications assessed today?: Yes  Autonomic neuropathy: No  CVA: No  Heart disease: No  Nephropathy: No  Peripheral neuropathy: No  Peripheral Vascular Disease: No  Retinopathy: No  Sexual dysfunction: No    Patient Problem List and Family Medical History reviewed for relevant medical history, current medical status, and diabetes risk factors.    Vitals:  Wt 79.4 kg (175 lb)   LMP  (LMP Unknown)   BMI 29.57 kg/m    Estimated body mass index is 29.57 kg/m  as calculated from the following:    Height as of 8/7/23: 1.638 m (5' 4.5\").    Weight as of this encounter: 79.4 kg (175 lb).   Last 3 BP:   BP Readings from Last 3 Encounters:   08/07/23 139/84   05/08/23 138/75   02/06/23 125/68       History   Smoking Status    Never   Smokeless Tobacco    Never       Labs:  Lab Results   Component Value Date    A1C 8.3 08/07/2023     Lab Results   Component Value Date     02/06/2023     03/22/2022     Lab Results   Component Value Date    LDL 98 02/06/2023     Direct Measure HDL   Date Value Ref Range Status   02/06/2023 58 >=50 mg/dL Final   ]  GFR Estimate   Date Value Ref Range Status   02/06/2023 71 >60 mL/min/1.73m2 Final     Comment:     eGFR calculated using 2021 CKD-EPI equation.   08/27/2020 >60 >60 mL/min/1.73m2 Final     GFR Estimate If Black   Date Value Ref Range Status   08/27/2020 >60 >60 mL/min/1.73m2 Final     Lab Results   Component Value Date    CR 0.89 02/06/2023     No results found for: MICROALBUMIN    Healthy " Eating:  Cultural/Muslim diet restrictions?: No  Meal planning/habits: Avoiding sweets, Low salt, Smaller portions  How many times a week on average do you eat food made away from home (restaurant/take-out)?: 3  Meals include: Lunch, Dinner, Evening Snack  Breakfast: cereal - [employer] or oatmeal with a bit of Honey or Mata latte from [employer] ( [employer]) and Portuguese  Lunch: cup of soup or 1/2 sand or leftovers  Dinner: + protein + vegetable + starch  Snacks: kind bars, chips , sometimes ice cream in evening with   Other: lots of food and DM guilt - self blame  Beverages: Water, Tea, Coffee drinks  Has patient met with a dietitian in the past?: No    Being Active:  Being Active Assessed Today: Yes  Exercise:: Yes  Days per week of moderate to strenuous exercise (like a brisk walk): 2 (walking in mall once a week - has pool at home and will swim regularly in months weather permits)  On average, minutes per day of exercise at this level: 20  How intense was your typical exercise? : Light (like stretching or slow walking)  Exercise Minutes per Week: 40  Barrier to exercise: None    Monitoring:  Monitoring Assessed Today: Yes  Did patient bring glucose meter to appointment? : No  Blood Glucose Meter: Contour Next  Times checking blood sugar at home (number): 2  Times checking blood sugar at home (per): Week (am fasting)  Blood glucose trend: No change    Currently Kathy has not been checking her blood glucose intermittently.  Today's reading was 150 mg/dL.  States that this was primarily due to the fact that she had no idea what numbers she was looking for or In the targets for those times.  This was all explained and clarified for her today in forward she will test once daily but will alternate times between a.m. fasting and 2 hours postprandial.    Taking Medications:  Diabetes Medication(s)       Biguanides       metFORMIN (GLUCOPHAGE) 500 MG tablet    Take 2 tablets (1,000 mg) by mouth 2 times daily (with meals)       Incretin Mimetic Agents       Semaglutide 3 MG TABS    Take 1 tablet by mouth daily            Taking Medication Assessed Today: Yes  Current Treatments: Oral Medication (taken by mouth)  Problems taking diabetes medications regularly?: No (cost is a barrier- Rybelsus is expensive)  Diabetes medication side effects?: No    Problem Solving:  Problem Solving Assessed Today: Yes  Is the patient at risk for hypoglycemia?: No  Is the patient at risk for DKA?: No  Does patient have severe weather/disaster plan for diabetes management?: Not Needed  Does patient have sick day plan for diabetes management?: Not Needed              Reducing Risks:  Reducing Risks Assessed Today: Yes  Diabetes Risks: Age over 45 years, Sedentary Lifestyle, Hyperlipidemia  CAD Risks: Diabetes Mellitus, Obesity, Stress, Post-menopausal, Sedentary lifestyle, Dyslipidemia  Has dilated eye exam at least once a year?: Yes  Sees dentist every 6 months?: Yes  Feet checked by healthcare provider in the last year?: Yes    Healthy Coping:  Healthy Coping Assessed Today: Yes  Emotional response to diabetes: Guilt/Self-blame  Informal Support system:: Spouse  Stage of change: PREPARATION (Decided to change - considering how)  Support resources: In-person Offerings  Patient Activation Measure Survey Score:       No data to display                  Care Plan and Education Provided:  Care Plan: Diabetes   Updates made by Kandace Leon RN since 9/28/2023 12:00 AM        Problem: HbA1C Not In Goal         Goal: Establish Regular Follow-Ups with PCP    This Visit's Progress: 100%   Note:    Follow-up appointments in place for both PCP and [facility]       Task: Discuss with PCP the recommended timing for patient's next follow up visit(s) Completed 9/28/2023   Responsible User: Kandace Leon RN        Task: Discuss schedule for PCP visits with patient Completed 9/28/2023   Responsible User: Kandace Leon RN        Goal: Get HbA1C Level in Goal    This  Visit's Progress: 60%   Note:    Current A1c 8.3% 8/7/2023       Task: Educate patient on diabetes education self-management topics Completed 9/28/2023   Responsible User: Kandace Leon RN        Task: Educate patient on benefits of regular glucose monitoring Completed 9/28/2023   Responsible User: Kandace Leon RN        Task: Refer patient to appropriate extended care team member, as needed (Medication Therapy Management, Behavioral Health, Physical Therapy, etc.)    Responsible User: Kandace Leon RN        Task: Discuss diabetes treatment plan with patient Completed 9/28/2023   Responsible User: Kandace Leon RN        Problem: Diabetes Self-Management Education Needed to Optimize Self-Care Behaviors         Goal: Understand diabetes pathophysiology and disease progression    This Visit's Progress: 100%   Note:    Completed at visit today       Task: Provide education on diabetes pathophysiology and disease progression specfic to patient's diabetes type Completed 9/28/2023   Responsible User: Kandace Leon RN        Goal: Healthy Eating - follow a healthy eating pattern for diabetes    This Visit's Progress: 60%        Task: Provide education on portion control and consistency in amount, composition and timing of food intake Completed 9/28/2023   Responsible User: Kandace Leon RN        Task: Provide education on managing carbohydrate intake (carbohydrate counting, plate planning method, etc.) Completed 9/28/2023   Responsible User: Kandace Leon RN        Task: Provide education on weight management    Responsible User: Kandace Leon RN        Task: Provide education on heart healthy eating    Responsible User: Kandace Leon RN        Task: Provide education on eating out Completed 9/28/2023   Responsible User: Kandace Leon RN        Task: Develop individualized healthy eating plan with patient Completed 9/28/2023   Responsible User: Kandace Leon RN        Goal:  Being Active - get regular physical activity, working up to at least 150 minutes per week    This Visit's Progress: 50%        Task: Provide education on relationship of activity to glucose and precautions to take if at risk for low glucose Completed 9/28/2023   Responsible User: Kandace Leon RN        Task: Discuss barriers to physical activity with patient Completed 9/28/2023   Responsible User: Kandace Leon RN        Task: Develop physical activity plan with patient Completed 9/28/2023   Responsible User: Kandace Leon RN        Task: Explore community resources including walking groups, assistance programs, and home videos    Responsible User: Kandace Leon RN        Goal: Monitoring - monitor glucose and ketones as directed    This Visit's Progress: 30%        Task: Provide education on blood glucose monitoring (purpose, proper technique, frequency, glucose targets, interpreting results, when to use glucose control solution, sharps disposal) Completed 9/28/2023   Responsible User: Kandace Leon RN        Task: Provide education on continuous glucose monitoring (sensor placement, use of yadi or /reader, understanding glucose trends, alerts and alarms, differences between sensor glucose and blood glucose)    Responsible User: Kandace Leon RN        Task: Provide education on ketone monitoring (when to monitor, frequency, etc.)    Responsible User: Kandace Leon RN        Goal: Taking Medication - patient is consistently taking medications as directed    This Visit's Progress: 80%   Note:    Current medications there were no missed or skipped doses, however dose of Rybelsus had never been increased past 3 mg.  Patient reported this medication is very expensive for her.  Would like to work on trying to find an alternative       Task: Provide education on action of prescribed medication, including when to take and possible side effects Completed 9/28/2023   Responsible User:  Kandace Leon RN        Task: Provide education on insulin and injectable diabetes medications, including administration, storage, site selection and rotation for injection sites    Responsible User: Kandace Leon RN   Note:    Injectables were discussed with patient today as a possible alternative to Rybelsus       Task: Discuss barriers to medication adherence with patient and provide management technique ideas as appropriate    Responsible User: Kandace Leon RN        Task: Provide education on frequency and refill details of medications    Responsible User: Kandace Leon RN        Goal: Problem Solving - know how to prevent and manage short-term diabetes complications         Task: Provide education on high blood glucose - causes, signs/symptoms, prevention and treatment    Responsible User: Kandace Leon RN        Task: Provide education on low blood glucose - causes, signs/symptoms, prevention, treatment, carrying a carbohydrate source at all times, and medical identification    Responsible User: Kandace Leon RN        Task: Provide education on safe travel with diabetes    Responsible User: Kandace Leon RN        Task: Provide education on how to care for diabetes on sick days    Responsible User: Kandace Leon RN        Task: Provide education on when to call a health care provider    Responsible User: Kandace Leon RN        Goal: Reducing Risks - know how to prevent and treat long-term diabetes complications    This Visit's Progress: 60%        Task: Provide education on major complications of diabetes, prevention, early diagnostic measures and treatment of complications Completed 9/28/2023   Responsible User: Kandace Leon RN        Task: Provide education on recommended care for dental, eye and foot health    Responsible User: Kandace Leon RN        Task: Provide education on Hemoglobin A1c - goals and relationship to blood glucose levels Completed  9/28/2023   Responsible User: Kandace Leon RN        Task: Provide education on recommendations for heart health - lipid levels and goals, blood pressure and goals, and aspirin therapy, if indicated    Responsible User: Kandace Leon RN        Task: Provide education on tobacco cessation    Responsible User: Kandace Leon RN        Goal: Healthy Coping - use available resources to cope with the challenges of managing diabetes    This Visit's Progress: 50%        Task: Discuss recognizing feelings about having diabetes Completed 9/28/2023   Responsible User: Kandace Leon RN   Note:    Continue to work on patient's feelings of guilt and self blame regarding her diagnosis and management       Task: Provide education on the benefits of making appropriate lifestyle changes Completed 9/28/2023   Responsible User: Kandace Leon RN        Task: Provide education on benefits of utilizing support systems Completed 9/28/2023   Responsible User: Kandace Leon RN        Task: Discuss methods for coping with stress    Responsible User: Kandace Leon RN        Task: Provide education on when to seek professional counseling    Responsible User: Kandace Leon RN          Thank you,  Kandace Leon RN [facility]  Certified Diabetes Care and   Visit type : DSMT      Time Spent: 60 minutes  Encounter Type: Individual    Any diabetes medication dose changes were made via the CDE Protocol per the patient's referring provider and primary care provider. A copy of this encounter was shared with the provider.     Much or all of the text in this note was generated through the use of the Dragon Dictate voice-to-text software.Errors in spelling or words which seem out of context are unintentional. Sound alike errors, in particular, may have escaped editing.

## 2023-10-06 ENCOUNTER — TELEPHONE (OUTPATIENT)
Dept: FAMILY MEDICINE | Facility: CLINIC | Age: 68
End: 2023-10-06
Payer: COMMERCIAL

## 2023-10-06 DIAGNOSIS — E11.9 TYPE 2 DIABETES MELLITUS WITHOUT COMPLICATION, WITHOUT LONG-TERM CURRENT USE OF INSULIN (H): Primary | ICD-10-CM

## 2023-10-06 NOTE — TELEPHONE ENCOUNTER
"Copied from CRM request 10/06/2023.     \"Topic: Non-Medical Question.     I need a refill on contour next test strip. I am checking blood sugar regularly now and I am out of test strips.   Pharmacy put a message in to you for a refill.  Thank You\"     Please advise, Patient does not have this on her medication list.   "

## 2023-11-01 ENCOUNTER — ALLIED HEALTH/NURSE VISIT (OUTPATIENT)
Dept: EDUCATION SERVICES | Facility: CLINIC | Age: 68
End: 2023-11-01
Payer: COMMERCIAL

## 2023-11-01 VITALS — WEIGHT: 175.4 LBS | BODY MASS INDEX: 29.64 KG/M2

## 2023-11-01 DIAGNOSIS — E11.9 TYPE 2 DIABETES MELLITUS WITHOUT COMPLICATION, WITHOUT LONG-TERM CURRENT USE OF INSULIN (H): Primary | ICD-10-CM

## 2023-11-01 PROCEDURE — G0108 DIAB MANAGE TRN  PER INDIV: HCPCS

## 2023-11-01 NOTE — LETTER
11/1/2023         RE: Kathy Posada  [address] [place] [address]        Dear Colleague,    Thank you for referring your patient, Kathy Posada, to the [facility]. Please see a copy of my visit note below.    Diabetes Self-Management Education & Support    Presents for: Individual review    Type of Service: In Person Visit      ASSESSMENT:  Kathy is a very pleasant 68-year-old returns to clinic today to review blood glucose, review medications and assess/assist her with her current diabetes self-management skills with an A1c not meeting ADA goal.  She arrived today unaccompanied and had for meter and logbook with her.  Medications were reviewed and she confirms she is currently taking 1000 mg of metformin p.o. twice daily and 3 mg of Rybelsus p.o. daily with no missed or skipped doses.  At her previous visit she had mentioned that Rybelsus had become very costly for her  (donut hole) so in the interim between that visit and the one today I had her look into co-pays for Ozempic, Trulicity, Jardiance, and Mounjaro.  She said today she had thought things over and had decided to just observe the cost for the remainder of the year as she was seeing improvement with the Rybelsus.  We reviewed intake and she has been mindful of her food choices and making sure she has had balance in all of her meals.  I informed her I want her to continue to work on increasing her activity level.  She also shared with me today that she continues to work on trying to get her  to be more supportive of her with her management and said she has made some progress.  When we reviewed her blood glucose readings today she appeared somewhat discouraged but I informed her that the main point was that we are seeing improvement and everything is heading in the right direction.  Reassured her she is doing well and I will continue to help her and support her moving forward.    Patient's most recent    Lab Results   Component Value Date    A1C 8.3 08/07/2023     is not meeting goal of <7.0    Diabetes knowledge and skills assessment:   Patient is knowledgeable in diabetes management concepts related to: Healthy Eating, Monitoring, and Taking Medication    Continue education with the following diabetes management concepts: Healthy Eating, Being Active, Reducing Risks, and Healthy Coping    Based on learning assessment above, most appropriate setting for further diabetes education would be: Individual setting.      PLAN  1. Eat 3 balanced meals each day - Monitor carb intake and limit to 45-60 grams per meal  This would be equal to 3-4 choices ~  1 choice = 15 grams    Do not wait longer than 4-5 hours to eat something  Snacks limit to no more than 30 grams of carbohydrates or 2 choices  Make sure you include protein source with each meal and at bedtime - this has been shown to help with blood glucose elevations    2. Check blood sugars once each day - please try and alternate the times you check between am fast ing( right after you wake before eating) and 2 hours AFTER dinner - this allows us to get a better idea of what is happening throughout your day , not at just one time     Fasting and before meal target is 80 - 130   2 hours after a meal target is < 180  remember to bring meter and log book to all appointments    3. Incorporate 30 minutes activity into each day - does not need to be all at one time & walking counts    4. Take diabetes medications as prescribed Continue Metformin 1000 mg twice daily - and we are increasing your Rybelsus to 7 mg daily       Topics to cover at upcoming visits: Healthy Eating, Being Active, Taking Medication, Reducing Risks, and Healthy Coping    Follow-up: PCP 11/7/23 [facility] 2/21/2024 ( has met time allotment per Medicare for 2023 for DSMT)    See Care Plan for co-developed, patient-state behavior change goals.  AVS provided for patient today.    Education Materials  "Provided:  No new materials provided today      SUBJECTIVE/OBJECTIVE:  Presents for: Individual review  Accompanied by: Self  Diabetes education in the past 24 mo: Yes ([facility] 9/27/23)  Diabetes type: Type 2  Date of diagnosis: 2017  Disease course: Improving  How confident are you filling out medical forms by yourself:: Extremely  [facility]iculty aff[facility]  Difficulty affording diabetes testing supplies?: No  Other concerns:: None  Cultural Influences/Ethnic Background:  Not  or       Diabetes Symptoms & Complications:  Fatigue: Sometimes  Neuropathy: No  Polydipsia: No  Polyphagia: No  Polyuria: No  Visual change: No  Slow healing wounds: No  Symptom course: Stable  Weight trend: Decreasing  Complications assessed today?: No    Patient Problem List and Family Medical History reviewed for relevant medical history, current medical status, and diabetes risk factors.    Vitals:  Wt 79.6 kg (175 lb 6.4 oz)   LMP  (LMP Unknown)   BMI 29.64 kg/m    Estimated body mass index is 29.64 kg/m  as calculated from the following:    Height as of 8/7/23: 1.638 m (5' 4.5\").    Weight as of this encounter: 79.6 kg (175 lb 6.4 oz).   Last 3 BP:   BP Readings from Last 3 Encounters:   08/07/23 139/84   05/08/23 138/75   02/06/23 125/68       History   Smoking Status     Never   Smokeless Tobacco     Never       Labs:  Lab Results   Component Value Date    A1C 8.3 08/07/2023     Lab Results   Component Value Date     02/06/2023     03/22/2022     Lab Results   Component Value Date    LDL 98 02/06/2023     Direct Measure HDL   Date Value Ref Range Status   02/06/2023 58 >=50 mg/dL Final   ]  GFR Estimate   Date Value Ref Range Status   02/06/2023 71 >60 mL/min/1.73m2 Final     Comment:     eGFR calculated using 2021 CKD-EPI equation.   08/27/2020 >60 >60 mL/min/1.73m2 Final     GFR Estimate If Black   Date Value Ref Range Status   08/27/2020 >60 >60 " "mL/min/1.73m2 Final     Lab Results   Component Value Date    CR 0.89 2023     No results found for: \"MICROALBUMIN\"    Healthy Eating:  Cultural/Quaker diet restrictions?: No  Meal planning/habits: Avoiding sweets, Low salt, Smaller portions  How many times a week on average do you eat food made away from home (restaurant/take-out)?: 3  Meals include: Lunch, Dinner, Evening Snack  Breakfast: eggs with ham or oatmeal with fresh fruit or greek yogurt  Lunch: cup of soup or 1/2 sand or leftovers  Dinner: + protein + vegetable + starch  Other: lots of food and DM guilt - self blame  - continues to work on getting her  to support her more  ( he is no longer bringing her ice cream in evenings)  Beverages: Water, Tea, Coffee drinks  Has patient met with a dietitian in the past?: No    Being Active:  Being Active Assessed Today: Yes  Exercise:: Yes  Days per week of moderate to strenuous exercise (like a brisk walk): 2 (walking in mall once a week - has pool at home and will swim regularly in months weather permits)  On average, minutes per day of exercise at this level: 20  How intense was your typical exercise? : Light (like stretching or slow walking)  Exercise Minutes per Week: 40  Barrier to exercise: None    Monitoring:  Monitoring Assessed Today: Yes  Did patient bring glucose meter to appointment? : No  Blood Glucose Meter: Contour Next  Times checking blood sugar at home (number): 2  Times checking blood sugar at home (per): Week (am fasting)  Blood glucose trend: No change    The following are Kathy's most recent blood glucose readings dating  through 10/19 with most recent listed first.    FB, 153, 155, 154, 128, 159, 157, 150, 162, 151, 148, 143, 150, 142, 147, 150    2-hour post dinner: 159, 175, 160, 152, 168, 149, 122, 141, 222, 135    These readings were reviewed and discussed with Kathy during her visit today.  As she was somewhat discouraged and deflated by her glucose readings " "today I reassured her that I could see the improvement and in all actuality overall there were \"almost there.\"  To help validate this to Kathy I took the overall average for all her readings and showed her that it was 153.5 mg/dL.  An A1c of 8% would reflect an average glucose of 183 mg/dL.  We will increase Rybelsus today to 7 mg.    Taking Medications:  Diabetes Medication(s)       Biguanides       metFORMIN (GLUCOPHAGE) 500 MG tablet    Take 2 tablets (1,000 mg) by mouth 2 times daily (with meals)      Incretin Mimetic Agents       Semaglutide (RYBELSUS) 7 MG tablet    Take 1 tablet (7 mg) by mouth daily     Semaglutide 3 MG TABS    Take 1 tablet by mouth daily            Taking Medication Assessed Today: Yes  Current Treatments: Oral Medication (taken by mouth)  Problems taking diabetes medications regularly?: No (cost is a barrier- Rybelsus is expensive)  Diabetes medication side effects?: No    Problem Solving:  Problem Solving Assessed Today: Yes  Is the patient at risk for hypoglycemia?: No  Is the patient at risk for DKA?: No  Does patient have severe weather/disaster plan for diabetes management?: Not Needed  Does patient have sick day plan for diabetes management?: Not Needed              Reducing Risks:  Reducing Risks Assessed Today: Yes  Diabetes Risks: Age over 45 years, Sedentary Lifestyle, Hyperlipidemia  CAD Risks: Diabetes Mellitus, Obesity, Stress, Post-menopausal, Sedentary lifestyle, Dyslipidemia  Has dilated eye exam at least once a year?: Yes  Sees dentist every 6 months?: Yes  Feet checked by healthcare provider in the last year?: Yes    Healthy Coping:  Healthy Coping Assessed Today: Yes  Emotional response to diabetes: Guilt/Self-blame, Concern for health and well-being  Informal Support system:: Spouse  Stage of change: ACTION (Actively working towards change)  Support resources: In-person Offerings  Patient Activation Measure Survey Score:       No data to display                  Care " Plan and Education Provided:  Care Plan: Diabetes   Updates made by Kandace Leon RN since 11/6/2023 12:00 AM        Problem: HbA1C Not In Goal         Goal: Establish Regular Follow-Ups with PCP    This Visit's Progress: 100%   Recent Progress: 100%   Note:    Follow-up appointments in place for both PCP and [facility]       Goal: Get HbA1C Level in Goal    This Visit's Progress: 80%   Recent Progress: 60%   Note:    Current A1c 8.3% 8/7/2023 - when BG was reviewed , improvement seen       Problem: Diabetes Self-Management Education Needed to Optimize Self-Care Behaviors         Goal: Healthy Eating - follow a healthy eating pattern for diabetes    This Visit's Progress: 80%   Recent Progress: 60%   Note:    Eating 3 meals daily - appear to be well balanced       Task: Provide education on weight management Completed 11/6/2023   Responsible User: Kandace Leon RN        Goal: Being Active - get regular physical activity, working up to at least 150 minutes per week    Recent Progress: 50%   Note:    Encouraged pt to continue to work on this       Goal: Monitoring - monitor glucose and ketones as directed    This Visit's Progress: 100%   Recent Progress: 30%        Goal: Taking Medication - patient is consistently taking medications as directed    This Visit's Progress: 100%   Recent Progress: 80%   Note:    Current medications there were no missed or skipped doses, however dose of Rybelsus increased to 7 mg       Goal: Problem Solving - know how to prevent and manage short-term diabetes complications    This Visit's Progress: 40%        Task: Provide education on high blood glucose - causes, signs/symptoms, prevention and treatment Completed 11/6/2023   Responsible User: Kandace Leon RN        Goal: Reducing Risks - know how to prevent and treat long-term diabetes complications    This Visit's Progress: 70%   Recent Progress: 60%   Note:    Will recheck A1c at PCP visit on 11/7       Goal: Healthy Coping -  use available resources to cope with the challenges of managing diabetes    This Visit's Progress: 70%   Recent Progress: 50%   Note:    Improving - pt continues to need support and affirmation          Thank you,  Kandace Dc RN [facility]  Certified Diabetes Care and   Visit type : DSMT      Time Spent: 60 minutes  Encounter Type: Individual    Any diabetes medication dose changes were made via the CDE Protocol per the patient's referring provider and primary care provider. A copy of this encounter was shared with the provider.     Much or all of the text in this note was generated through the use of the Dragon Dictate voice-to-text software.Errors in spelling or words which seem out of context are unintentional. Sound alike errors, in particular, may have escaped editing.

## 2023-11-01 NOTE — PATIENT INSTRUCTIONS
1. Eat 3 balanced meals each day - Monitor carb intake and limit to 45-60 grams per meal  This would be equal to 3-4 choices ~  1 choice = 15 grams    Do not wait longer than 4-5 hours to eat something  Snacks limit to no more than 30 grams of carbohydrates or 2 choices  Make sure you include protein source with each meal and at bedtime - this has been shown to help with blood glucose elevations    2. Check blood sugars once each day - please try and alternate the times you check between am fast ing( right after you wake before eating) and 2 hours AFTER dinner - this allows us to get a better idea of what is happening throughout your day , not at just one time     Fasting and before meal target is 80 - 130   2 hours after a meal target is < 180  remember to bring meter and log book to all appointments    3. Incorporate 30 minutes activity into each day - does not need to be all at one time & walking counts    4. Take diabetes medications as prescribed Continue Metformin 1000 mg twice daily - and we are increasing your Rybelsus to 7 mg daily       YOU GOT THIS GIRLFRIEND !!! KEEP IT UP   - LET ME KNOW IF YOU NEED ANYTHING     Thank you for coming in to see me today !      I value your experience and would be very thankful for your time in providing feedback in our clinic survey.    You may receive an e-mail, text message or even something in the mail from [employer].  This is a survey to let us know how we are doing - the survey will be related to your diabetes education and me.

## 2023-11-07 ENCOUNTER — OFFICE VISIT (OUTPATIENT)
Dept: FAMILY MEDICINE | Facility: CLINIC | Age: 68
End: 2023-11-07
Payer: COMMERCIAL

## 2023-11-07 VITALS
WEIGHT: 177 LBS | SYSTOLIC BLOOD PRESSURE: 140 MMHG | BODY MASS INDEX: 29.49 KG/M2 | HEART RATE: 86 BPM | OXYGEN SATURATION: 95 % | HEIGHT: 65 IN | RESPIRATION RATE: 16 BRPM | DIASTOLIC BLOOD PRESSURE: 82 MMHG

## 2023-11-07 DIAGNOSIS — R03.0 ELEVATED BLOOD PRESSURE READING WITHOUT DIAGNOSIS OF HYPERTENSION: ICD-10-CM

## 2023-11-07 DIAGNOSIS — F41.1 GENERALIZED ANXIETY DISORDER: ICD-10-CM

## 2023-11-07 DIAGNOSIS — E11.9 TYPE 2 DIABETES MELLITUS WITHOUT COMPLICATION, WITHOUT LONG-TERM CURRENT USE OF INSULIN (H): Primary | ICD-10-CM

## 2023-11-07 LAB
HBA1C MFR BLD: 8.2 % (ref 0–5.6)
HOLD SPECIMEN: NORMAL

## 2023-11-07 PROCEDURE — 90678 RSV VACC PREF BIVALENT IM: CPT | Performed by: FAMILY MEDICINE

## 2023-11-07 PROCEDURE — 99214 OFFICE O/P EST MOD 30 MIN: CPT | Mod: 25 | Performed by: FAMILY MEDICINE

## 2023-11-07 PROCEDURE — 36415 COLL VENOUS BLD VENIPUNCTURE: CPT | Performed by: FAMILY MEDICINE

## 2023-11-07 PROCEDURE — 96381 ADMN RSV MONOC ANTB IM NJX: CPT | Performed by: FAMILY MEDICINE

## 2023-11-07 PROCEDURE — 83036 HEMOGLOBIN GLYCOSYLATED A1C: CPT | Performed by: FAMILY MEDICINE

## 2023-11-07 RX ORDER — ESCITALOPRAM OXALATE 5 MG/1
10 TABLET ORAL DAILY
Qty: 90 TABLET | Refills: 0 | Status: SHIPPED | OUTPATIENT
Start: 2023-11-07 | End: 2024-02-21

## 2023-11-07 ASSESSMENT — ANXIETY QUESTIONNAIRES
7. FEELING AFRAID AS IF SOMETHING AWFUL MIGHT HAPPEN: SEVERAL DAYS
1. FEELING NERVOUS, ANXIOUS, OR ON EDGE: SEVERAL DAYS
5. BEING SO RESTLESS THAT IT IS HARD TO SIT STILL: NOT AT ALL
GAD7 TOTAL SCORE: 10
4. TROUBLE RELAXING: SEVERAL DAYS
6. BECOMING EASILY ANNOYED OR IRRITABLE: SEVERAL DAYS
GAD7 TOTAL SCORE: 10
3. WORRYING TOO MUCH ABOUT DIFFERENT THINGS: NEARLY EVERY DAY
2. NOT BEING ABLE TO STOP OR CONTROL WORRYING: NEARLY EVERY DAY

## 2023-11-07 ASSESSMENT — PATIENT HEALTH QUESTIONNAIRE - PHQ9: SUM OF ALL RESPONSES TO PHQ QUESTIONS 1-9: 1

## 2023-11-07 NOTE — PROGRESS NOTES
"  Assessment & Plan     ICD-10-CM    1. Type 2 diabetes mellitus without complication, without long-term current use of insulin (H)  E11.9 HEMOGLOBIN A1C     HEMOGLOBIN A1C      2. Generalized anxiety disorder  F41.1 escitalopram (LEXAPRO) 5 MG tablet      3. Elevated blood pressure reading without diagnosis of hypertension  R03.0         Patient here today for follow-up.  Following issues addressed  1: Type 2 diabetes: Remains under suboptimal control.  Rybelsus was recently increased to 7 mg by the diabetes educator.  She still using the 3 mg as she has a whole month supply costing her nearly $300.  I have asked her to use 2 of the current tablets for perhaps better control.  She does have an upcoming appointment with diabetes educator.  Today we again went over dietary modification.  2: Generalized anxiety disorder: Patient has underlying anxiety.  We explored it further.  I would like her to start CBT but she does not feel that her social situation will change.  She would like to try medication and will try on Lexapro 5 mg daily.  She is hopeful that it will make changes.  3: Elevated blood pressure: Possibly related to underlying anxiety.  Continue to monitor.    Lab Results   Component Value Date    A1C 8.2 11/07/2023    A1C 8.3 08/07/2023    A1C 8.4 05/08/2023    A1C 7.6 02/06/2023    A1C 8.2 11/03/2022          BMI:   Estimated body mass index is 29.91 kg/m  as calculated from the following:    Height as of this encounter: 1.638 m (5' 4.5\").    Weight as of this encounter: 80.3 kg (177 lb).       MEDICATIONS:        - Continue other medications without change       -Take 2 of the 5 pills till all done and then start the new prescription        -Start Lexapro 5 mg daily.      Mana Lynch MD  [facility]    Bolivar Chavez is a 68 year old, presenting for the following health issues:  Diabetes (Pt is fasting today, pt had covid shot and flu shot. Pt is wondering about any " other shots. )        11/7/2023     9:33 AM   Additional Questions   Roomed by Yelena Arreola CMA       History of Present Illness       Diabetes:   She presents for follow up of diabetes.  She is checking home blood glucose one time daily.   She checks blood glucose before and after meals.  Blood glucose is never over 200 and never under 70. She is aware of hypoglycemia symptoms including none.    She has no concerns regarding her diabetes at this time.   She is not experiencing numbness or burning in feet, excessive thirst, blurry vision, weight changes or redness, sores or blisters on feet.           She eats 0-1 servings of fruits and vegetables daily.She consumes 0 sweetened beverage(s) daily.She exercises with enough effort to increase her heart rate 9 or less minutes per day.  She exercises with enough effort to increase her heart rate 3 or less days per week.   She is taking medications regularly.     Patient Active Problem List   Diagnosis    Hyperlipidemia    Diabetes type 2, controlled (H)    Obesity (BMI 30-39.9)    Lichen sclerosus of female genitalia    Controlled type 2 diabetes mellitus with hyperglycemia, without long-term current use of insulin (H)    Type 2 diabetes mellitus without complication, without long-term current use of insulin (H)     Current Outpatient Medications   Medication    atorvastatin (LIPITOR) 40 MG tablet    blood glucose (NO BRAND SPECIFIED) test strip    clobetasol (TEMOVATE) 0.05 % external ointment    escitalopram (LEXAPRO) 5 MG tablet    metFORMIN (GLUCOPHAGE) 500 MG tablet    Semaglutide (RYBELSUS) 7 MG tablet    Semaglutide 3 MG TABS     No current facility-administered medications for this visit.       Review of Systems   Constitutional, HEENT, cardiovascular, pulmonary, gi and gu systems are negative, except as otherwise noted.      Objective    BP (!) 140/82 (BP Location: Left arm, Patient Position: Sitting, Cuff Size: Adult Regular)   Pulse 86   Resp 16   Ht 1.638  "m (5' 4.5\")   Wt 80.3 kg (177 lb)   LMP  (LMP Unknown)   SpO2 95%   BMI 29.91 kg/m    Body mass index is 29.91 kg/m .  Physical Exam   GENERAL: healthy, alert and no distress  MS: no gross musculoskeletal defects noted, no edema.  Monofilament exam is normal.    Results for orders placed or performed in visit on 11/07/23 (from the past 24 hour(s))   HEMOGLOBIN A1C   Result Value Ref Range    Hemoglobin A1C 8.2 (H) 0.0 - 5.6 %   Extra Tube    Narrative    The following orders were created for panel order Extra Tube.  Procedure                               Abnormality         Status                     ---------                               -----------         ------                     Extra Green Top (Lithium...[[id number]]                      Final result                 Please view results for these tests on the individual orders.   Extra Green Top (Lithium Heparin) Tube   Result Value Ref Range    Hold Specimen JIC                      "

## 2023-11-07 NOTE — PROGRESS NOTES
Diabetes Self-Management Education & Support    Presents for: Individual review    Type of Service: In Person Visit      ASSESSMENT:  Kathy is a very pleasant 68-year-old returns to clinic today to review blood glucose, review medications and assess/assist her with her current diabetes self-management skills with an A1c not meeting ADA goal.  She arrived today unaccompanied and had for meter and logbook with her.  Medications were reviewed and she confirms she is currently taking 1000 mg of metformin p.o. twice daily and 3 mg of Rybelsus p.o. daily with no missed or skipped doses.  At her previous visit she had mentioned that Rybelsus had become very costly for her  (donut hole) so in the interim between that visit and the one today I had her look into co-pays for Ozempic, Trulicity, Jardiance, and Mounjaro.  She said today she had thought things over and had decided to just observe the cost for the remainder of the year as she was seeing improvement with the Rybelsus.  We reviewed intake and she has been mindful of her food choices and making sure she has had balance in all of her meals.  I informed her I want her to continue to work on increasing her activity level.  She also shared with me today that she continues to work on trying to get her  to be more supportive of her with her management and said she has made some progress.  When we reviewed her blood glucose readings today she appeared somewhat discouraged but I informed her that the main point was that we are seeing improvement and everything is heading in the right direction.  Reassured her she is doing well and I will continue to help her and support her moving forward.    Patient's most recent   Lab Results   Component Value Date    A1C 8.3 08/07/2023     is not meeting goal of <7.0    Diabetes knowledge and skills assessment:   Patient is knowledgeable in diabetes management concepts related to: Healthy Eating, Monitoring, and Taking  Medication    Continue education with the following diabetes management concepts: Healthy Eating, Being Active, Reducing Risks, and Healthy Coping    Based on learning assessment above, most appropriate setting for further diabetes education would be: Individual setting.      PLAN  1. Eat 3 balanced meals each day - Monitor carb intake and limit to 45-60 grams per meal  This would be equal to 3-4 choices ~  1 choice = 15 grams    Do not wait longer than 4-5 hours to eat something  Snacks limit to no more than 30 grams of carbohydrates or 2 choices  Make sure you include protein source with each meal and at bedtime - this has been shown to help with blood glucose elevations    2. Check blood sugars once each day - please try and alternate the times you check between am fast ing( right after you wake before eating) and 2 hours AFTER dinner - this allows us to get a better idea of what is happening throughout your day , not at just one time     Fasting and before meal target is 80 - 130   2 hours after a meal target is < 180  remember to bring meter and log book to all appointments    3. Incorporate 30 minutes activity into each day - does not need to be all at one time & walking counts    4. Take diabetes medications as prescribed Continue Metformin 1000 mg twice daily - and we are increasing your Rybelsus to 7 mg daily       Topics to cover at upcoming visits: Healthy Eating, Being Active, Taking Medication, Reducing Risks, and Healthy Coping    Follow-up: PCP 11/7/23 [facility] 2/21/2024 ( has met time allotment per Medicare for 2023 for DSMT)    See Care Plan for co-developed, patient-state behavior change goals.  AVS provided for patient today.    Education Materials Provided:  No new materials provided today      SUBJECTIVE/OBJECTIVE:  Presents for: Individual review  Accompanied by: Self  Diabetes education in the past 24 mo: Yes ([facility] 9/27/23)  Diabetes type: Type 2  Date of diagnosis: 2017  Disease course:  "Improving  How confident are you filling out medical forms by yourself:: Extremely  Difficulty affording diabetes medication?: Sometimes (currently in donut hole)  Difficulty affording diabetes testing supplies?: No  Other concerns:: None  Cultural Influences/Ethnic Background:  Not  or       Diabetes Symptoms & Complications:  Fatigue: Sometimes  Neuropathy: No  Polydipsia: No  Polyphagia: No  Polyuria: No  Visual change: No  Slow healing wounds: No  Symptom course: Stable  Weight trend: Decreasing  Complications assessed today?: No    Patient Problem List and Family Medical History reviewed for relevant medical history, current medical status, and diabetes risk factors.    Vitals:  Wt 79.6 kg (175 lb 6.4 oz)   LMP  (LMP Unknown)   BMI 29.64 kg/m    Estimated body mass index is 29.64 kg/m  as calculated from the following:    Height as of 8/7/23: 1.638 m (5' 4.5\").    Weight as of this encounter: 79.6 kg (175 lb 6.4 oz).   Last 3 BP:   BP Readings from Last 3 Encounters:   08/07/23 139/84   05/08/23 138/75   02/06/23 125/68       History   Smoking Status    Never   Smokeless Tobacco    Never       Labs:  Lab Results   Component Value Date    A1C 8.3 08/07/2023     Lab Results   Component Value Date     02/06/2023     03/22/2022     Lab Results   Component Value Date    LDL 98 02/06/2023     Direct Measure HDL   Date Value Ref Range Status   02/06/2023 58 >=50 mg/dL Final   ]  GFR Estimate   Date Value Ref Range Status   02/06/2023 71 >60 mL/min/1.73m2 Final     Comment:     eGFR calculated using 2021 CKD-EPI equation.   08/27/2020 >60 >60 mL/min/1.73m2 Final     GFR Estimate If Black   Date Value Ref Range Status   08/27/2020 >60 >60 mL/min/1.73m2 Final     Lab Results   Component Value Date    CR 0.89 02/06/2023     No results found for: \"MICROALBUMIN\"    Healthy Eating:  Cultural/Taoist diet restrictions?: No  Meal planning/habits: Avoiding sweets, Low salt, Smaller portions  How " "many times a week on average do you eat food made away from home (restaurant/take-out)?: 3  Meals include: Lunch, Dinner, Evening Snack  Breakfast: eggs with ham or oatmeal with fresh fruit or greek yogurt  Lunch: cup of soup or 1/2 sand or leftovers  Dinner: + protein + vegetable + starch  Other: lots of food and DM guilt - self blame  - continues to work on getting her  to support her more  ( he is no longer bringing her ice cream in evenings)  Beverages: Water, Tea, Coffee drinks  Has patient met with a dietitian in the past?: No    Being Active:  Being Active Assessed Today: Yes  Exercise:: Yes  Days per week of moderate to strenuous exercise (like a brisk walk): 2 (walking in mall once a week - has pool at home and will swim regularly in months weather permits)  On average, minutes per day of exercise at this level: 20  How intense was your typical exercise? : Light (like stretching or slow walking)  Exercise Minutes per Week: 40  Barrier to exercise: None    Monitoring:  Monitoring Assessed Today: Yes  Did patient bring glucose meter to appointment? : No  Blood Glucose Meter: Contour Next  Times checking blood sugar at home (number): 2  Times checking blood sugar at home (per): Week (am fasting)  Blood glucose trend: No change    The following are Kathy's most recent blood glucose readings dating  through 10/19 with most recent listed first.    FB, 153, 155, 154, 128, 159, 157, 150, 162, 151, 148, 143, 150, 142, 147, 150    2-hour post dinner: 159, 175, 160, 152, 168, 149, 122, 141, 222, 135    These readings were reviewed and discussed with Kathy during her visit today.  As she was somewhat discouraged and deflated by her glucose readings today I reassured her that I could see the improvement and in all actuality overall there were \"almost there.\"  To help validate this to Kathy I took the overall average for all her readings and showed her that it was 153.5 mg/dL.  An A1c of 8% would reflect " an average glucose of 183 mg/dL.  We will increase Rybelsus today to 7 mg.    Taking Medications:  Diabetes Medication(s)       Biguanides       metFORMIN (GLUCOPHAGE) 500 MG tablet    Take 2 tablets (1,000 mg) by mouth 2 times daily (with meals)      Incretin Mimetic Agents       Semaglutide (RYBELSUS) 7 MG tablet    Take 1 tablet (7 mg) by mouth daily     Semaglutide 3 MG TABS    Take 1 tablet by mouth daily            Taking Medication Assessed Today: Yes  Current Treatments: Oral Medication (taken by mouth)  Problems taking diabetes medications regularly?: No (cost is a barrier- Rybelsus is expensive)  Diabetes medication side effects?: No    Problem Solving:  Problem Solving Assessed Today: Yes  Is the patient at risk for hypoglycemia?: No  Is the patient at risk for DKA?: No  Does patient have severe weather/disaster plan for diabetes management?: Not Needed  Does patient have sick day plan for diabetes management?: Not Needed              Reducing Risks:  Reducing Risks Assessed Today: Yes  Diabetes Risks: Age over 45 years, Sedentary Lifestyle, Hyperlipidemia  CAD Risks: Diabetes Mellitus, Obesity, Stress, Post-menopausal, Sedentary lifestyle, Dyslipidemia  Has dilated eye exam at least once a year?: Yes  Sees dentist every 6 months?: Yes  Feet checked by healthcare provider in the last year?: Yes    Healthy Coping:  Healthy Coping Assessed Today: Yes  Emotional response to diabetes: Guilt/Self-blame, Concern for health and well-being  Informal Support system:: Spouse  Stage of change: ACTION (Actively working towards change)  Support resources: In-person Offerings  Patient Activation Measure Survey Score:       No data to display                  Care Plan and Education Provided:  Care Plan: Diabetes   Updates made by Kandace Leon RN since 11/6/2023 12:00 AM        Problem: HbA1C Not In Goal         Goal: Establish Regular Follow-Ups with PCP    This Visit's Progress: 100%   Recent Progress: 100%    Note:    [facility]       Goal: Get HbA1C Level in Goal    This Visit's Progress: 80%   Recent Progress: 60%   Note:    Current A1c 8.3% 8/7/2023 - when BG was reviewed , improvement seen       Problem: Diabetes Self-Management Education Needed to Optimize Self-Care Behaviors         Goal: Healthy Eating - follow a healthy eating pattern for diabetes    This Visit's Progress: 80%   Recent Progress: 60%   Note:    Eating 3 meals daily - appear to be well balanced       Task: Provide education on weight management Completed 11/6/2023   Responsible User: Kandace Leon RN        Goal: Being Active - get regular physical activity, working up to at least 150 minutes per week    Recent Progress: 50%   Note:    Encouraged pt to continue to work on this       Goal: Monitoring - monitor glucose and ketones as directed    This Visit's Progress: 100%   Recent Progress: 30%        Goal: Taking Medication - patient is consistently taking medications as directed    This Visit's Progress: 100%   Recent Progress: 80%   Note:    Current medications there were no missed or skipped doses, however dose of Rybelsus increased to 7 mg       Goal: Problem Solving - know how to prevent and manage short-term diabetes complications    This Visit's Progress: 40%        Task: Provide education on high blood glucose - causes, signs/symptoms, prevention and treatment Completed 11/6/2023   Responsible User: Kandace Leon RN        Goal: Reducing Risks - know how to prevent and treat long-term diabetes complications    This Visit's Progress: 70%   Recent Progress: 60%   Note:    Will recheck A1c at PCP visit on 11/7       Goal: Healthy Coping - use available resources to cope with the challenges of managing diabetes    This Visit's Progress: 70%   Recent Progress: 50%   Note:    Improving - pt continues to need support and affirmation          Thank you,  Kandace Leon RN [facility]  Certified  Diabetes Care and   Visit type : DSMT      Time Spent: 60 minutes  Encounter Type: Individual    Any diabetes medication dose changes were made via the CDE Protocol per the patient's referring provider and primary care provider. A copy of this encounter was shared with the provider.     Much or all of the text in this note was generated through the use of the Dragon Dictate voice-to-text software.Errors in spelling or words which seem out of context are unintentional. Sound alike errors, in particular, may have escaped editing.

## 2024-01-08 ENCOUNTER — PATIENT OUTREACH (OUTPATIENT)
Dept: CARE COORDINATION | Facility: CLINIC | Age: 69
End: 2024-01-08
Payer: COMMERCIAL

## 2024-01-22 ENCOUNTER — PATIENT OUTREACH (OUTPATIENT)
Dept: CARE COORDINATION | Facility: CLINIC | Age: 69
End: 2024-01-22
Payer: COMMERCIAL

## 2024-01-29 ENCOUNTER — OFFICE VISIT (OUTPATIENT)
Dept: AUDIOLOGY | Facility: CLINIC | Age: 69
End: 2024-01-29
Payer: COMMERCIAL

## 2024-01-29 ENCOUNTER — OFFICE VISIT (OUTPATIENT)
Dept: OTOLARYNGOLOGY | Facility: CLINIC | Age: 69
End: 2024-01-29
Payer: COMMERCIAL

## 2024-01-29 DIAGNOSIS — H90.42 SENSORINEURAL HEARING LOSS (SNHL) OF LEFT EAR WITH UNRESTRICTED HEARING OF RIGHT EAR: Primary | ICD-10-CM

## 2024-01-29 PROCEDURE — 99214 OFFICE O/P EST MOD 30 MIN: CPT | Performed by: OTOLARYNGOLOGY

## 2024-01-29 PROCEDURE — 92550 TYMPANOMETRY & REFLEX THRESH: CPT | Performed by: AUDIOLOGIST

## 2024-01-29 PROCEDURE — 92557 COMPREHENSIVE HEARING TEST: CPT | Performed by: AUDIOLOGIST

## 2024-01-29 NOTE — PROGRESS NOTES
"CHIEF COMPLAINT:  Patient presents with:  RECHECK: 6 Month Follow up, Hearing Loss, feels hearing has continues to decrease in left ear, hearing aid as of July           HISTORY OF PRESENT ILLNESS    Kathy was seen in follow up after previous 7/17/2023 visit for audiogram review.  She developed COVID and developed fullness in both ears, but the left one \"never came back).   No dizziness.  She does have tinnitus in the left (hissing).  She does wear a hearing aid in the left ear.         REVIEW OF SYSTEMS    Review of Systems: a 10-system review is reviewed at this encounter.  See scanned document.       Allergies   Allergen Reactions    Amoxicillin Rash     Broke out in a rash after a round of this            PHYSICAL EXAM:        HEAD: Normal appearance and symmetry:  No cutaneous lesions.      EARS:        RIGHT: TM nl intact   LEFT:    TM nl intact  NOSE:    Dorsum:   straight       ORAL CAVITY/OROPHARYNX:    Lips:  Normal.     NECK:  Trachea:  midline     NEURO:   Alert and Oriented    GAIT AND STATION:  normal     RESPIRATORY:   Symmetry and Respiratory effort    PSYCH:   normal mood and affect    SKIN:  warm and dry         IMPRESSION:   Encounter Diagnosis   Name Primary?    Sensorineural hearing loss (SNHL) of left ear with unrestricted hearing of right ear Yes            RECOMMENDATIONS:    Orders Placed This Encounter   Procedures    MR Brain w/o & w Contrast        Return visit 6 months with audiogram  Follow up for hearing aid adjustment with [facility] in [facility]  "

## 2024-01-29 NOTE — PROGRESS NOTES
AUDIOLOGY REPORT     SUMMARY: Audiology visit completed. See audiogram for results.       RECOMMENDATIONS: Follow-up with ENT.    Castillo Kaplan, CCC-A  Doctor of Audiology  [place] Licensed Audiologist #[id number]

## 2024-01-29 NOTE — LETTER
"    1/29/2024         RE: Kathy Posada  [address] [place] [address]        Dear Colleague,    Thank you for referring your patient, Kathy Posada, to the [facility]. Please see a copy of my visit note below.    CHIEF COMPLAINT:  Patient presents with:  RECHECK: 6 Month Follow up, Hearing Loss, feels hearing has continues to decrease in left ear, hearing aid as of July           HISTORY OF PRESENT ILLNESS    Kathy was seen in follow up after previous 7/17/2023 visit for audiogram review.  She developed COVID and developed fullness in both ears, but the left one \"never came back).   No dizziness.  She does have tinnitus in the left (hissing).  She does wear a hearing aid in the left ear.         REVIEW OF SYSTEMS    Review of Systems: a 10-system review is reviewed at this encounter.  See scanned document.       Allergies   Allergen Reactions     Amoxicillin Rash     Broke out in a rash after a round of this            PHYSICAL EXAM:        HEAD: Normal appearance and symmetry:  No cutaneous lesions.      EARS:        RIGHT: TM nl intact   LEFT:    TM nl intact  NOSE:    Dorsum:   straight       ORAL CAVITY/OROPHARYNX:    Lips:  Normal.     NECK:  Trachea:  midline     NEURO:   Alert and Oriented    GAIT AND STATION:  normal     RESPIRATORY:   Symmetry and Respiratory effort    PSYCH:   normal mood and affect    SKIN:  warm and dry         IMPRESSION:   Encounter Diagnosis   Name Primary?     Sensorineural hearing loss (SNHL) of left ear with unrestricted hearing of right ear Yes            RECOMMENDATIONS:    Orders Placed This Encounter   Procedures     MR Brain w/o & w Contrast        Return visit 6 months with audiogram  Follow up for hearing aid adjustment with [facility] in [facility]      Again, thank you for allowing me to participate in the care of your patient.        Sincerely,        Sandor Mcnally MD  "

## 2024-02-07 DIAGNOSIS — E78.5 HYPERLIPIDEMIA, UNSPECIFIED HYPERLIPIDEMIA TYPE: ICD-10-CM

## 2024-02-08 RX ORDER — ATORVASTATIN CALCIUM 40 MG/1
40 TABLET, FILM COATED ORAL AT BEDTIME
Qty: 90 TABLET | Refills: 2 | Status: SHIPPED | OUTPATIENT
Start: 2024-02-08 | End: 2024-08-06

## 2024-02-20 ENCOUNTER — HOSPITAL ENCOUNTER (OUTPATIENT)
Dept: MRI IMAGING | Facility: HOSPITAL | Age: 69
Discharge: HOME | End: 2024-02-20
Attending: OTOLARYNGOLOGY | Admitting: OTOLARYNGOLOGY
Payer: COMMERCIAL

## 2024-02-20 DIAGNOSIS — H90.42 SENSORINEURAL HEARING LOSS (SNHL) OF LEFT EAR WITH UNRESTRICTED HEARING OF RIGHT EAR: ICD-10-CM

## 2024-02-20 PROCEDURE — A9585 GADOBUTROL INJECTION: HCPCS | Performed by: OTOLARYNGOLOGY

## 2024-02-20 PROCEDURE — 255N000002 HC RX 255 OP 636: Performed by: OTOLARYNGOLOGY

## 2024-02-20 PROCEDURE — 70553 MRI BRAIN STEM W/O & W/DYE: CPT

## 2024-02-20 RX ORDER — GADOBUTROL 604.72 MG/ML
0.1 INJECTION INTRAVENOUS ONCE
Status: COMPLETED | OUTPATIENT
Start: 2024-02-20 | End: 2024-02-20

## 2024-02-20 RX ADMIN — GADOBUTROL 8 ML: 604.72 INJECTION INTRAVENOUS at 16:02

## 2024-02-21 ENCOUNTER — TELEPHONE (OUTPATIENT)
Dept: OTOLARYNGOLOGY | Facility: CLINIC | Age: 69
End: 2024-02-21

## 2024-02-21 ENCOUNTER — ALLIED HEALTH/NURSE VISIT (OUTPATIENT)
Dept: EDUCATION SERVICES | Facility: CLINIC | Age: 69
End: 2024-02-21
Payer: COMMERCIAL

## 2024-02-21 VITALS — BODY MASS INDEX: 28.93 KG/M2 | WEIGHT: 171.2 LBS

## 2024-02-21 DIAGNOSIS — D32.9 MENINGIOMA (H): Primary | ICD-10-CM

## 2024-02-21 DIAGNOSIS — E11.9 TYPE 2 DIABETES MELLITUS WITHOUT COMPLICATION, WITHOUT LONG-TERM CURRENT USE OF INSULIN (H): Primary | ICD-10-CM

## 2024-02-21 DIAGNOSIS — E11.9 TYPE 2 DIABETES MELLITUS WITHOUT COMPLICATION, WITHOUT LONG-TERM CURRENT USE OF INSULIN (H): ICD-10-CM

## 2024-02-21 LAB — HBA1C MFR BLD: 7.7 % (ref 0–5.6)

## 2024-02-21 PROCEDURE — 36415 COLL VENOUS BLD VENIPUNCTURE: CPT

## 2024-02-21 PROCEDURE — 83036 HEMOGLOBIN GLYCOSYLATED A1C: CPT

## 2024-02-21 PROCEDURE — G0108 DIAB MANAGE TRN  PER INDIV: HCPCS

## 2024-02-21 NOTE — TELEPHONE ENCOUNTER
----- Message from Sandor Mcnally MD sent at 2/21/2024  9:34 AM CST -----  Please advise patient she has a small benign looking growth seen on the MRI.    She will need follow up with ENT at the [facility] (Dr. Suzanne Griffith).

## 2024-02-21 NOTE — LETTER
2/21/2024         RE: Kathy Posada  [address] [place] [address]        Dear Colleague,    Thank you for referring your patient, Kathy Posada, to the [facility]. Please see a copy of my visit note below.    Diabetes Self-Management Education & Support    Presents for: Individual review    Type of Service: In Person Visit      ASSESSMENT:  Kathy is a very pleasant 68-year-old who returns to clinic today to review blood glucose, medications, recheck A1c and to assess/assist her with her current diabetes self-management skills where needed.  She arrived today unaccompanied and had both her meter and logbook with her.  Kathy continues to check her blood glucose once daily-a.m. fasting.  Blood glucose data was reviewed and discussed during visit today.  See note below under monitoring.  Medications were reviewed and she confirms she is currently taking 1000 mg of metformin p.o. twice daily and 7 mg of Rybelsus p.o. daily with no missed or skipped doses.  Prior to our visit today we did recheck her A1c and was very pleased to see it returned lower at 7.7% from a previous 8.2%.  I congratulated her on this and informed her she should be especially problem because taking an A1c after the holidays can sometimes be disappointing.  Informed her she is doing well with working on increasing her activity level and making sure her meals are well-balanced and portioned she should see continued progress.  She informing today that her  is being somewhat more supportive and does not seem to be pressuring her to eat and consume foods that will notably affect her blood glucose adversely.  I encouraged her to keep up the good work and she will be following up with her primary care provider, Dr. Lynch in the next week.  I instructed her to call with any questions or concerns that might come up before our next visit scheduled in May.    Patient's most recent   Lab Results    Component Value Date    A1C 7.7 02/21/2024     is not meeting goal of  7-7.5%    Diabetes knowledge and skills assessment:   Patient is knowledgeable in diabetes management concepts related to: Healthy Eating, Taking Medication, and Healthy Coping    Continue education with the following diabetes management concepts: Being Active, Monitoring, Problem Solving, Reducing Risks, and Healthy Coping    Based on learning assessment above, most appropriate setting for further diabetes education would be: Individual setting.      PLAN    See Patient Instructions for co-developed, patient-stated behavior change goals.  AVS printed and provided to patient today. See Follow-Up section for recommended follow-up.       Topics to cover at upcoming visits: Healthy Eating, Being Active, Problem Solving, Reducing Risks, and Healthy Coping    Follow-up: 5/29/24    See Care Plan for co-developed, patient-state behavior change goals.  AVS provided for patient today.    Education Materials Provided:  BG Log Sheet      SUBJECTIVE/OBJECTIVE:  Presents for: Individual review  Accompanied by: Self  Diabetes education in the past 24 mo: Yes (LV 9/27/23)  Focus of Visit: Monitoring, Assistance w/ making life changes, Self-care behavioral goal setting, Taking Medication, Reducing Risks  Diabetes type: Type 2  Date of diagnosis: 2017  Disease course: Improving (A1c today 7.7% from previous 8.2%)  How confident are you filling out medical forms by yourself:: Extremely  Difficulty affording diabetes medication?: Sometimes (currently in [employer])  Difficulty affording diabetes testing supplies?: No  Other concerns:: None  Cultural Influences/Ethnic Background:  Not  or       Diabetes Symptoms & Complications:  Weight trend: Stable  Symptom course: Stable  Disease course: Improving (A1c today 7.7% from previous 8.2%)  Complications assessed today?: No    Patient Problem List and Family Medical History reviewed for relevant medical  "history, current medical status, and diabetes risk factors.    Vitals:  Wt 77.7 kg (171 lb 3.2 oz)   LMP  (LMP Unknown)   BMI 28.93 kg/m    Estimated body mass index is 28.93 kg/m  as calculated from the following:    Height as of 11/7/23: 1.638 m (5' 4.5\").    Weight as of this encounter: 77.7 kg (171 lb 3.2 oz).   Last 3 BP:   BP Readings from Last 3 Encounters:   11/07/23 (!) 140/82   08/07/23 139/84   05/08/23 138/75       History   Smoking Status     Never   Smokeless Tobacco     Never       Labs:  Lab Results   Component Value Date    A1C 7.7 02/21/2024     Lab Results   Component Value Date     02/06/2023     03/22/2022     Lab Results   Component Value Date    LDL 98 02/06/2023     Direct Measure HDL   Date Value Ref Range Status   02/06/2023 58 >=50 mg/dL Final   ]  GFR Estimate   Date Value Ref Range Status   02/06/2023 71 >60 mL/min/1.73m2 Final     Comment:     eGFR calculated using 2021 CKD-EPI equation.   08/27/2020 >60 >60 mL/min/1.73m2 Final     GFR Estimate If Black   Date Value Ref Range Status   08/27/2020 >60 >60 mL/min/1.73m2 Final     Lab Results   Component Value Date    CR 0.89 02/06/2023     No results found for: \"MICROALBUMIN\"    Healthy Eating:  Healthy Eating Assessed Today: Yes  Cultural/Cheondoism diet restrictions?: No  Do you have any food allergies or intolerances?: No  Meal planning/habits: Avoiding sweets, Low salt, Smaller portions  Who cooks/prepares meals for you?: Self  Who purchases food in  your home?: Self  How many times a week on average do you eat food made away from home (restaurant/take-out)?: 3  Meals include: Lunch, Dinner, Evening Snack  Breakfast: Kashi cereal or Oatmeal or PB toast  Lunch: cup of soup or 1/2 sand or leftovers - sometimes just cheese and crackers  Dinner: + protein + vegetable + starch  Snacks: evenings - popcorn, veggies and dip, cheese and crackers , once in awhile 1 small scoop of ice cream  Other: getting better with portions and " food choices - saying no -- decreased appetite from Rybelsus  Beverages: Water, Tea (milk with cereal)  Has patient met with a dietitian in the past?: No    Being Active:  Being Active Assessed Today: Yes  Exercise:: Yes  Days per week of moderate to strenuous exercise (like a brisk walk): 2 (walking in mall once a week - has pool at home and will swim regularly in months weather permits)  On average, minutes per day of exercise at this level: 30  How intense was your typical exercise? : Light (like stretching or slow walking) (walking in mall 2x/week - 30 min)  Exercise Minutes per Week: 60  Barrier to exercise: None    Monitoring:  Monitoring Assessed Today: Yes  Did patient bring glucose meter to appointment? : Yes (and logbook)  Blood Glucose Meter: ContourNext  Times checking blood sugar at home (number): 2  Times checking blood sugar at home (per): Week (am fasting)  Blood glucose trend: No change    The following are Kathy's most recent blood glucose readings from her logbook today.  All readings are a.m. fasting.    FB, 156, 138, 144, 137, 136, 137, 136, 141, 137, 142, 144, 157, 125    These readings were reviewed and discussed with Kathy during her visit today.  Informed her that overall I can see the improvement she has made and that was reflected today on her lower A1c.  I congratulated her on this.  Encouraged her to work on increasing her activity level and working to make sure she has a protein at bedtime.  Moving forward I did ask that she start alternating her weeks for checking her blood glucose-week #1 check every morning, then week #2 check 2 hours after dinner and so forth.    Taking Medications:  Diabetes Medication(s)       Biguanides       metFORMIN (GLUCOPHAGE) 500 MG tablet Take 2 tablets (1,000 mg) by mouth 2 times daily (with meals)       Incretin Mimetic Agents       Semaglutide (RYBELSUS) 7 MG tablet Take 1 tablet (7 mg) by mouth daily            Taking Medication Assessed Today:  Yes  Current Treatments: Oral Medication (taken by mouth)  Problems taking diabetes medications regularly?: No (cost is a barrier- Rybelsus is expensive)  Diabetes medication side effects?: No    Problem Solving:  Problem Solving Assessed Today: Yes  Is the patient at risk for hypoglycemia?: No  Is the patient at risk for DKA?: No  Does patient have severe weather/disaster plan for diabetes management?: Not Needed  Does patient have sick day plan for diabetes management?: Not Needed              Reducing Risks:  Reducing Risks Assessed Today: Yes  Diabetes Risks: Age over 45 years, Sedentary Lifestyle, Hyperlipidemia  CAD Risks: Diabetes Mellitus, Obesity, Stress, Post-menopausal, Sedentary lifestyle, Dyslipidemia  Has dilated eye exam at least once a year?: Yes  Sees dentist every 6 months?: Yes  Feet checked by healthcare provider in the last year?: Yes    Healthy Coping:  Healthy Coping Assessed Today: Yes  Emotional response to diabetes: Guilt/Self-blame, Concern for health and well-being  Informal Support system:: Spouse  Stage of change: ACTION (Actively working towards change)  Support resources: In-person Offerings  Patient Activation Measure Survey Score:       No data to display                  Care Plan and Education Provided:  Care Plan: Diabetes   Updates made by Kandace Leon RN since 2/21/2024 12:00 AM        Problem: HbA1C Not In Goal         Goal: Establish Regular Follow-Ups with PCP    Recent Progress: 100%   Note:    Follow-up appointments in place for both PCP and [facility].       Goal: Get HbA1C Level in Goal    This Visit's Progress: 80%   Recent Progress: 80%   Note:    Current A1c 7.7% from prev 8.2%       Problem: Diabetes Self-Management Education Needed to Optimize Self-Care Behaviors         Goal: Healthy Eating - follow a healthy eating pattern for diabetes    This Visit's Progress: 100%   Recent Progress: 80%   Note:    Eating 3 meals daily - appear to be well balanced       Goal:  Being Active - get regular physical activity, working up to at least 150 minutes per week    This Visit's Progress: 60%   Recent Progress: 50%   Note:    Encouraged pt to continue to work on this.       Goal: Monitoring - monitor glucose and ketones as directed    This Visit's Progress: 80%   Recent Progress: 100%   Note:    Requested she work on getting some postprandial readings.       Goal: Taking Medication - patient is consistently taking medications as directed    This Visit's Progress: 100%   Recent Progress: 100%   Note:    Current medications there were no missed or skipped doses.  No changes made today.       Goal: Reducing Risks - know how to prevent and treat long-term diabetes complications    This Visit's Progress: 80%   Recent Progress: 70%        Goal: Healthy Coping - use available resources to cope with the challenges of managing diabetes    This Visit's Progress: 80%   Recent Progress: 70%   Note:    Has improved since last visit.  Appreciates support and reassurance         Thank you,  Kandace Dc RN [facility]  Certified Diabetes Care and   Visit type : DSMT      Time Spent: 30 minutes  Encounter Type: Individual    Any diabetes medication dose changes were made via the CDE Protocol per the patient's referring provider and primary care provider. A copy of this encounter was shared with the provider.

## 2024-02-21 NOTE — PATIENT INSTRUCTIONS
1. Eat 3 balanced meals each day - Monitor carb intake and limit to 45-60 grams per meal  This would be equal to 3-4 choices ~  1 choice = 15 grams    Do not wait longer than 4-5 hours to eat something  Snacks limit to no more than 30 grams of carbohydrates or 2 choices  Make sure you include protein source with each meal and at bedtime - this has been shown to help with blood glucose elevations    2. Check blood sugars once each day - please try and alternate the times you check between am fast ing( right after you wake before eating) and 2 hours AFTER dinner - this allows us to get a better idea of what is happening throughout your day , not at just one time     Fasting and before meal target is 80 - 130   2 hours after a meal target is < 180  remember to bring meter and log book to all appointments    3. Incorporate 30 minutes activity into each day - does not need to be all at one time & walking counts    4. Take diabetes medications as prescribed Continue Metformin 1000 mg twice daily, Rybelsus 7 mg each day          A1c today was 7.7% WOOOOOOO HOOOOO CONGRATS GIRL - KEEP IT UP !!!      Thank you for coming in to see me today !      I value your experience and would be very thankful for your time in providing feedback in our clinic survey.    You may receive an e-mail, text message or even something in the mail from [employer].  This is a survey to let us know how we are doing - the survey will be related to your diabetes education and me.

## 2024-02-21 NOTE — PROGRESS NOTES
Diabetes Self-Management Education & Support    Presents for: Individual review    Type of Service: In Person Visit      ASSESSMENT:  Kathy is a very pleasant 68-year-old who returns to clinic today to review blood glucose, medications, recheck A1c and to assess/assist her with her current diabetes self-management skills where needed.  She arrived today unaccompanied and had both her meter and logbook with her.  Kathy continues to check her blood glucose once daily-a.m. fasting.  Blood glucose data was reviewed and discussed during visit today.  See note below under monitoring.  Medications were reviewed and she confirms she is currently taking 1000 mg of metformin p.o. twice daily and 7 mg of Rybelsus p.o. daily with no missed or skipped doses.  Prior to our visit today we did recheck her A1c and was very pleased to see it returned lower at 7.7% from a previous 8.2%.  I congratulated her on this and informed her she should be especially problem because taking an A1c after the holidays can sometimes be disappointing.  Informed her she is doing well with working on increasing her activity level and making sure her meals are well-balanced and portioned she should see continued progress.  She informing today that her  is being somewhat more supportive and does not seem to be pressuring her to eat and consume foods that will notably affect her blood glucose adversely.  I encouraged her to keep up the good work and she will be following up with her primary care provider, Dr. Lynch in the next week.  I instructed her to call with any questions or concerns that might come up before our next visit scheduled in May.    Patient's most recent   Lab Results   Component Value Date    A1C 7.7 02/21/2024     is not meeting goal of  7-7.5%    Diabetes knowledge and skills assessment:   Patient is knowledgeable in diabetes management concepts related to: Healthy Eating, Taking Medication, and Healthy Coping    Continue  "education with the following diabetes management concepts: Being Active, Monitoring, Problem Solving, Reducing Risks, and Healthy Coping    Based on learning assessment above, most appropriate setting for further diabetes education would be: Individual setting.      PLAN    See Patient Instructions for co-developed, patient-stated behavior change goals.  AVS printed and provided to patient today. See Follow-Up section for recommended follow-up.       Topics to cover at upcoming visits: Healthy Eating, Being Active, Problem Solving, Reducing Risks, and Healthy Coping    Follow-up: 5/29/24    See Care Plan for co-developed, patient-state behavior change goals.  AVS provided for patient today.    Education Materials Provided:  BG Log Sheet      SUBJECTIVE/OBJECTIVE:  Presents for: Individual review  Accompanied by: Self  Diabetes education in the past 24 mo: Yes (LV 9/27/23)  Focus of Visit: Monitoring, Assistance w/ making life changes, Self-care behavioral goal setting, Taking Medication, Reducing Risks  Diabetes type: Type 2  Date of diagnosis: 2017  Disease course: Improving (A1c today 7.7% from previous 8.2%)  How confident are you filling out medical forms by yourself:: Extremely  Difficulty affording diabetes medication?: [facility]  Difficulty affording diabetes testing supplies?: No  Other concerns:: None  Cultural Influences/Ethnic Background:  Not  or       Diabetes Symptoms & Complications:  Weight trend: Stable  Symptom course: Stable  Disease course: Improving (A1c today 7.7% from previous 8.2%)  Complications assessed today?: No    Patient Problem List and Family Medical History reviewed for relevant medical history, current medical status, and diabetes risk factors.    Vitals:  Wt 77.7 kg (171 lb 3.2 oz)   LMP  (LMP Unknown)   BMI 28.93 kg/m    Estimated body mass index is 28.93 kg/m  as calculated from the following:    Height as of 11/7/23: 1.638 m (5' 4.5\").   " " Weight as of this encounter: 77.7 kg (171 lb 3.2 oz).   Last 3 BP:   BP Readings from Last 3 Encounters:   11/07/23 (!) 140/82   08/07/23 139/84   05/08/23 138/75       History   Smoking Status    Never   Smokeless Tobacco    Never       Labs:  Lab Results   Component Value Date    A1C 7.7 02/21/2024     Lab Results   Component Value Date     02/06/2023     03/22/2022     Lab Results   Component Value Date    LDL 98 02/06/2023     Direct Measure HDL   Date Value Ref Range Status   02/06/2023 58 >=50 mg/dL Final   ]  GFR Estimate   Date Value Ref Range Status   02/06/2023 71 >60 mL/min/1.73m2 Final     Comment:     eGFR calculated using 2021 CKD-EPI equation.   08/27/2020 >60 >60 mL/min/1.73m2 Final     GFR Estimate If Black   Date Value Ref Range Status   08/27/2020 >60 >60 mL/min/1.73m2 Final     Lab Results   Component Value Date    CR 0.89 02/06/2023     No results found for: \"MICROALBUMIN\"    Healthy Eating:  Healthy Eating Assessed Today: Yes  Cultural/Mandaen diet restrictions?: No  Do you have any food allergies or intolerances?: No  Meal planning/habits: Avoiding sweets, Low salt, Smaller portions  Who cooks/prepares meals for you?: Self  Who purchases food in  your home?: Self  How many times a week on average do you eat food made away from home (restaurant/take-out)?: 3  Meals include: Lunch, Dinner, Evening Snack  Breakfast: Kashi cereal or Oatmeal or PB toast  Lunch: cup of soup or 1/2 sand or leftovers - sometimes just cheese and crackers  Dinner: + protein + vegetable + starch  Snacks: evenings - popcorn, veggies and dip, cheese and crackers , once in awhile 1 small scoop of ice cream  Other: getting better with portions and food choices - saying no -- decreased appetite from Rybelsus  Beverages: Water, Tea (milk with cereal)  Has patient met with a dietitian in the past?: No    Being Active:  Being Active Assessed Today: Yes  Exercise:: Yes  Days per week of moderate to strenuous " exercise (like a brisk walk): 2 (walking in mall once a week - has pool at home and will swim regularly in months weather permits)  On average, minutes per day of exercise at this level: 30  How intense was your typical exercise? : Light (like stretching or slow walking) (walking in mall 2x/week - 30 min)  Exercise Minutes per Week: 60  Barrier to exercise: None    Monitoring:  Monitoring Assessed Today: Yes  Did patient bring glucose meter to appointment? : Yes (and logbook)  Blood Glucose Meter: ContourNext  Times checking blood sugar at home (number): 2  Times checking blood sugar at home (per): Week (am fasting)  Blood glucose trend: No change    The following are Kathy's most recent blood glucose readings from her logbook today.  All readings are a.m. fasting.    FB, 156, 138, 144, 137, 136, 137, 136, 141, 137, 142, 144, 157, 125    These readings were reviewed and discussed with Kathy during her visit today.  Informed her that overall I can see the improvement she has made and that was reflected today on her lower A1c.  I congratulated her on this.  Encouraged her to work on increasing her activity level and working to make sure she has a protein at bedtime.  Moving forward I did ask that she start alternating her weeks for checking her blood glucose-week #1 check every morning, then week #2 check 2 hours after dinner and so forth.    Taking Medications:  Diabetes Medication(s)       Biguanides       metFORMIN (GLUCOPHAGE) 500 MG tablet Take 2 tablets (1,000 mg) by mouth 2 times daily (with meals)       Incretin Mimetic Agents       Semaglutide (RYBELSUS) 7 MG tablet Take 1 tablet (7 mg) by mouth daily            Taking Medication Assessed Today: Yes  Current Treatments: Oral Medication (taken by mouth)  Problems taking diabetes medications regularly?: No (cost is a barrier- Rybelsus is expensive)  Diabetes medication side effects?: No    Problem Solving:  Problem Solving Assessed Today: Yes  Is the  patient at risk for hypoglycemia?: No  Is the patient at risk for DKA?: No  Does patient have severe weather/disaster plan for diabetes management?: Not Needed  Does patient have sick day plan for diabetes management?: Not Needed              Reducing Risks:  Reducing Risks Assessed Today: Yes  Diabetes Risks: Age over 45 years, Sedentary Lifestyle, Hyperlipidemia  CAD Risks: Diabetes Mellitus, Obesity, Stress, Post-menopausal, Sedentary lifestyle, Dyslipidemia  Has dilated eye exam at least once a year?: Yes  Sees dentist every 6 months?: Yes  Feet checked by healthcare provider in the last year?: Yes    Healthy Coping:  Healthy Coping Assessed Today: Yes  Emotional response to diabetes: Guilt/Self-blame, Concern for health and well-being  Informal Support system:: Spouse  Stage of change: ACTION (Actively working towards change)  Support resources: In-person Offerings  Patient Activation Measure Survey Score:       No data to display                  Care Plan and Education Provided:  Care Plan: Diabetes   Updates made by Kandace Leon RN since 2/21/2024 12:00 AM        Problem: HbA1C Not In Goal         Goal: Establish Regular Follow-Ups with PCP    Recent Progress: 100%   Note:    Follow-up appointments in place for both PCP and [facility].       Goal: Get HbA1C Level in Goal    This Visit's Progress: 80%   Recent Progress: 80%   Note:    Current A1c 7.7% from prev 8.2%       Problem: Diabetes Self-Management Education Needed to Optimize Self-Care Behaviors         Goal: Healthy Eating - follow a healthy eating pattern for diabetes    This Visit's Progress: 100%   Recent Progress: 80%   Note:    Eating 3 meals daily - appear to be well balanced       Goal: Being Active - get regular physical activity, working up to at least 150 minutes per week    This Visit's Progress: 60%   Recent Progress: 50%   Note:    Encouraged pt to continue to work on this.       Goal: Monitoring - monitor glucose and ketones as directed     This Visit's Progress: 80%   Recent Progress: 100%   Note:    Requested she work on getting some postprandial readings.       Goal: Taking Medication - patient is consistently taking medications as directed    This Visit's Progress: 100%   Recent Progress: 100%   Note:    Current medications there were no missed or skipped doses.  No changes made today.       Goal: Reducing Risks - know how to prevent and treat long-term diabetes complications    This Visit's Progress: 80%   Recent Progress: 70%        Goal: Healthy Coping - use available resources to cope with the challenges of managing diabetes    This Visit's Progress: 80%   Recent Progress: 70%   Note:    Has improved since last visit.  Appreciates support and reassurance         Thank you,  Kandace Dc RN [facility]  Certified Diabetes Care and   Visit type : DSMT      Time Spent: 30 minutes  Encounter Type: Individual    Any diabetes medication dose changes were made via the CDE Protocol per the patient's referring provider and primary care provider. A copy of this encounter was shared with the provider.

## 2024-02-21 NOTE — TELEPHONE ENCOUNTER
Spoke with Kathy and provided pt with results listed below per Dr. Mcnally.    Please advise patient she has a small benign looking growth seen on the MRI.    She will need follow up with ENT at the  of  (Dr. Suzanne Griffith).     [facility]      Nan Hurst  RN, BSN, PHN  [facility]  ENT  [address]  [place] [address]  Office:[phone]  Employed by [employer]

## 2024-02-21 NOTE — TELEPHONE ENCOUNTER
[facility] Call [facility]    Phone Message    May a detailed message be left on voicemail: yes     Reason for Call: Other: Pt returning call to Nan Hurst, please call again.  [place] location, thanks     Action Taken: Other: ENT    Travel Screening: Not Applicable

## 2024-02-21 NOTE — TELEPHONE ENCOUNTER
Attempted to call pt and left a  VM to call back about results.    [facility]      Nan Hurst  RN, BSN, PHN  [facility]  ENT  [address]  [place] [address]  Office:[phone]  Employed by [employer]

## 2024-02-22 NOTE — TELEPHONE ENCOUNTER
FUTURE VISIT INFORMATION      FUTURE VISIT INFORMATION:  Date: 3/12/24  Time: 3:15 PM with [place]  Location: [facility]  REFERRAL INFORMATION:  Referring provider:  Sandor Mcnally MD   Referring providers clinic:  [facility] ENT   Reason for visit/diagnosis:  Meningioma- Referred by Sandor Mcnally MD in [facility]     RECORDS REQUESTED FROM      Clinic name Comments Records Status Imaging Status   [facility] ENT  1/29/24 referral/ OV with Sandor Mcnally MD     1/2023 - 1/29/24 audiogram [facility] 2/20/24 MR brain Epic PACS

## 2024-02-26 ENCOUNTER — OFFICE VISIT (OUTPATIENT)
Dept: FAMILY MEDICINE | Facility: CLINIC | Age: 69
End: 2024-02-26
Payer: COMMERCIAL

## 2024-02-26 VITALS
DIASTOLIC BLOOD PRESSURE: 74 MMHG | WEIGHT: 171 LBS | HEART RATE: 85 BPM | SYSTOLIC BLOOD PRESSURE: 133 MMHG | RESPIRATION RATE: 16 BRPM | OXYGEN SATURATION: 98 % | HEIGHT: 65 IN | BODY MASS INDEX: 28.49 KG/M2

## 2024-02-26 DIAGNOSIS — E11.65 CONTROLLED TYPE 2 DIABETES MELLITUS WITH HYPERGLYCEMIA, WITHOUT LONG-TERM CURRENT USE OF INSULIN (H): Primary | ICD-10-CM

## 2024-02-26 DIAGNOSIS — E78.5 HYPERLIPIDEMIA, UNSPECIFIED HYPERLIPIDEMIA TYPE: ICD-10-CM

## 2024-02-26 PROBLEM — E66.9 OBESITY (BMI 30-39.9): Status: RESOLVED | Noted: 2019-03-01 | Resolved: 2024-02-26

## 2024-02-26 LAB
ALBUMIN SERPL BCG-MCNC: 4.2 G/DL (ref 3.5–5.2)
ALP SERPL-CCNC: 92 U/L (ref 40–150)
ALT SERPL W P-5'-P-CCNC: 34 U/L (ref 0–50)
ANION GAP SERPL CALCULATED.3IONS-SCNC: 12 MMOL/L (ref 7–15)
AST SERPL W P-5'-P-CCNC: 28 U/L (ref 0–45)
BILIRUB SERPL-MCNC: 0.8 MG/DL
BUN SERPL-MCNC: 9.3 MG/DL (ref 8–23)
CALCIUM SERPL-MCNC: 9.3 MG/DL (ref 8.8–10.2)
CHLORIDE SERPL-SCNC: 103 MMOL/L (ref 98–107)
CHOLEST SERPL-MCNC: 175 MG/DL
CREAT SERPL-MCNC: 0.89 MG/DL (ref 0.51–0.95)
CREAT UR-MCNC: 168 MG/DL
DEPRECATED HCO3 PLAS-SCNC: 26 MMOL/L (ref 22–29)
EGFRCR SERPLBLD CKD-EPI 2021: 71 ML/MIN/1.73M2
FASTING STATUS PATIENT QL REPORTED: YES
GLUCOSE SERPL-MCNC: 139 MG/DL (ref 70–99)
HDLC SERPL-MCNC: 54 MG/DL
LDLC SERPL CALC-MCNC: 95 MG/DL
MICROALBUMIN UR-MCNC: 37.8 MG/L
MICROALBUMIN/CREAT UR: 22.5 MG/G CR (ref 0–25)
NONHDLC SERPL-MCNC: 121 MG/DL
POTASSIUM SERPL-SCNC: 4.1 MMOL/L (ref 3.4–5.3)
PROT SERPL-MCNC: 7.3 G/DL (ref 6.4–8.3)
SODIUM SERPL-SCNC: 141 MMOL/L (ref 135–145)
TRIGL SERPL-MCNC: 132 MG/DL

## 2024-02-26 PROCEDURE — 36415 COLL VENOUS BLD VENIPUNCTURE: CPT | Performed by: FAMILY MEDICINE

## 2024-02-26 PROCEDURE — 80061 LIPID PANEL: CPT | Performed by: FAMILY MEDICINE

## 2024-02-26 PROCEDURE — 82043 UR ALBUMIN QUANTITATIVE: CPT | Performed by: FAMILY MEDICINE

## 2024-02-26 PROCEDURE — 99214 OFFICE O/P EST MOD 30 MIN: CPT | Performed by: FAMILY MEDICINE

## 2024-02-26 PROCEDURE — 80053 COMPREHEN METABOLIC PANEL: CPT | Performed by: FAMILY MEDICINE

## 2024-02-26 PROCEDURE — G2211 COMPLEX E/M VISIT ADD ON: HCPCS | Performed by: FAMILY MEDICINE

## 2024-02-26 PROCEDURE — 82570 ASSAY OF URINE CREATININE: CPT | Performed by: FAMILY MEDICINE

## 2024-02-26 NOTE — PROGRESS NOTES
Assessment & Plan     ICD-10-CM    1. Controlled type 2 diabetes mellitus with hyperglycemia, without long-term current use of insulin (H)  E11.65 Albumin Random Urine Quantitative with Creat Ratio     Comprehensive metabolic panel (BMP + Alb, Alk Phos, ALT, AST, Total. Bili, TP)     PRIMARY CARE FOLLOW-UP SCHEDULING     Albumin Random Urine Quantitative with Creat Ratio     Comprehensive metabolic panel (BMP + Alb, Alk Phos, ALT, AST, Total. Bili, TP)      2. Hyperlipidemia, unspecified hyperlipidemia type  E78.5 Lipid panel reflex to direct LDL Fasting     PRIMARY CARE FOLLOW-UP SCHEDULING     Lipid panel reflex to direct LDL Fasting          Patient is here today for follow-up.  Diabetes remains under suboptimal control.  Discussed with the patient.  Her Rybelsus has recently been increased.  She is trying lifestyle modification.  She has had some success in losing weight.  Will check lipid panel today.  Goal of LDL should be less than 70.  Will discuss this further at upcoming annual wellness visit.    The longitudinal plan of care for the diagnosis(es)/condition(s) as documented were addressed during this visit. Due to the added complexity in care, I will continue to support Kathy in the subsequent management and with ongoing continuity of care.      MEDICATIONS:  Continue current medications without change    Subjective   Kathy is a 68 year old, presenting for the following health issues:  Diabetes (Pt is fasting today, A1C was checked last week. )        2/26/2024     9:29 AM   Additional Questions   Roomed by Yelena Arreola CMA     History of Present Illness       Diabetes:   She presents for follow up of diabetes.  She is checking home blood glucose one time daily.   She checks blood glucose after meals.  Blood glucose is never over 200 and never under 70. She is aware of hypoglycemia symptoms including none.    She has no concerns regarding her diabetes at this time.   She is not experiencing numbness or  "burning in feet, excessive thirst, blurry vision, weight changes or redness, sores or blisters on feet.           She eats 4 or more servings of fruits and vegetables daily.She consumes 0 sweetened beverage(s) daily.She exercises with enough effort to increase her heart rate 9 or less minutes per day.  She exercises with enough effort to increase her heart rate 3 or less days per week.   She is taking medications regularly.     Patient Active Problem List   Diagnosis    Hyperlipidemia    Diabetes type 2, controlled (H)    Lichen sclerosus of female genitalia    Controlled type 2 diabetes mellitus with hyperglycemia, without long-term current use of insulin (H)    Type 2 diabetes mellitus without complication, without long-term current use of insulin (H)     Current Outpatient Medications   Medication    atorvastatin (LIPITOR) 40 MG tablet    blood glucose (NO BRAND SPECIFIED) test strip    clobetasol (TEMOVATE) 0.05 % external ointment    metFORMIN (GLUCOPHAGE) 500 MG tablet    Semaglutide (RYBELSUS) 7 MG tablet     No current facility-administered medications for this visit.         Review of Systems  Constitutional, HEENT, cardiovascular, pulmonary, gi and gu systems are negative, except as otherwise noted.      Objective    /74   Pulse 85   Resp 16   Ht 1.638 m (5' 4.5\")   Wt 77.6 kg (171 lb)   LMP  (LMP Unknown)   SpO2 98%   BMI 28.90 kg/m    Body mass index is 28.9 kg/m .  Physical Exam   GENERAL: alert and no distress  NECK: no adenopathy, no asymmetry, masses, or scars  RESP: lungs clear to auscultation - no rales, rhonchi or wheezes  CV: regular rate and rhythm, normal S1 S2, no S3 or S4, no murmur, click or rub, no peripheral edema  ABDOMEN: soft, nontender, no hepatosplenomegaly, no masses and bowel sounds normal  MS: no gross musculoskeletal defects noted, no edema    Results for orders placed or performed in visit on 02/26/24 (from the past 24 hour(s))   Lipid panel reflex to direct LDL " Fasting   Result Value Ref Range    Cholesterol 175 <200 mg/dL    Triglycerides 132 <150 mg/dL    Direct Measure HDL 54 >=50 mg/dL    LDL Cholesterol Calculated 95 <=100 mg/dL    Non HDL Cholesterol 121 <130 mg/dL    Patient Fasting > 8hrs? Yes     Narrative    Cholesterol  Desirable:  <200 mg/dL    Triglycerides  Normal:  Less than 150 mg/dL  Borderline High:  150-199 mg/dL  High:  200-499 mg/dL  Very High:  Greater than or equal to 500 mg/dL    Direct Measure HDL  Female:  Greater than or equal to 50 mg/dL   Male:  Greater than or equal to 40 mg/dL    LDL Cholesterol  Desirable:  <100mg/dL  Above Desirable:  100-129 mg/dL   Borderline High:  130-159 mg/dL   High:  160-189 mg/dL   Very High:  >= 190 mg/dL    Non HDL Cholesterol  Desirable:  130 mg/dL  Above Desirable:  130-159 mg/dL  Borderline High:  160-189 mg/dL  High:  190-219 mg/dL  Very High:  Greater than or equal to 220 mg/dL   Comprehensive metabolic panel (BMP + Alb, Alk Phos, ALT, AST, Total. Bili, TP)   Result Value Ref Range    Sodium 141 135 - 145 mmol/L    Potassium 4.1 3.4 - 5.3 mmol/L    Carbon Dioxide (CO2) 26 22 - 29 mmol/L    Anion Gap 12 7 - 15 mmol/L    Urea Nitrogen 9.3 8.0 - 23.0 mg/dL    Creatinine 0.89 0.51 - 0.95 mg/dL    GFR Estimate 71 >60 mL/min/1.73m2    Calcium 9.3 8.8 - 10.2 mg/dL    Chloride 103 98 - 107 mmol/L    Glucose 139 (H) 70 - 99 mg/dL    Alkaline Phosphatase 92 40 - 150 U/L    AST 28 0 - 45 U/L    ALT 34 0 - 50 U/L    Protein Total 7.3 6.4 - 8.3 g/dL    Albumin 4.2 3.5 - 5.2 g/dL    Bilirubin Total 0.8 <=1.2 mg/dL   Albumin Random Urine Quantitative with Creat Ratio   Result Value Ref Range    Creatinine Urine mg/dL 168.0 mg/dL    Albumin Urine mg/L 37.8 mg/L    Albumin Urine mg/g Cr 22.50 0.00 - 25.00 mg/g Cr           Signed Electronically by: Mana Lynch MD

## 2024-03-08 NOTE — PROGRESS NOTES
[facility]  Department of Neurosurgery    Name: Kathy Posada  MRN: [id number]  : 1955     Reason for visit:  left petrous meningioma, new patient visit    Dear Dr. Mcnally and Dr. Lynch,    It was a pleasure to see Ms. Posada in the [facility] today.  I have reviewed the referral notes and imaging and have summarized our visit here. As you recall, Ms. Posada is a pleasant 68 year-old female who presented with abrupt unilateral hearing loss and aural fullness in the left ear, starting summer of 2022. 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 She was treated with steroids for her hearing loss but it did not recover fully.      Review of Systems:   Pertinent items are noted in HPI or as in patient entered ROS below, remainder of complete ROS is negative.     Medications:  atorvastatin, clobetasol, metformin, semaglutide     Allergies: Amoxicillin      Past Medical History: lichen sclerosus, hyperlipidemia, diabetes, appendectomy, cholecystectomy    Family History: CAD in parents     Social History: lifetime nonsmoker     Physical Exam:   General: No acute distress.   Head: No signs of trauma.    Eyes: Conjunctivae are normal.  Mouth/Throat: Oropharynx moist.  Neck: Normal range of motion.    Resp: No respiratory distress.   MSK: Moves all extremities.  No obvious deformity.  Neuro: The patient is fully oriented and quite pleasant. Speech normal. Extraocular movements are intact without nystagmus. Facial sensation is intact in V1, V2, V3 distributions. Facial nerve function is normal, rated as a House Brackmann 1, without synkinesis.  Palate is symmetric. Shoulders are full strength. Tongue is midline. There is no pronator drift. Full strength in all extremities. Sensation intact throughout.  Psych: Normal mood and affect. Behavior is  normal.      Audiogram:  We reviewed the audiogram 1/29/2024 which shows:  Right PTA 12 dB,  % at 60 dB  Left PTA 62 dB, WRS 76 % at 90 dB    Imaging:  We reviewed the MRI from 2/20/2024 which shows a left cerebellopontine angle tumor with origin at the posterior petrous dura, measuring 9 x 6mm most consistent with a meningioma.     Assessment:  Left petrous meningioma  2.  AAO HNS hearing class C    Plan:  We reviewed the patient's history, imaging, natural history and expected outcomes of conservative management and intervention. Options include observation, radiosurgery, and surgical resection and we reviewed the risks and benefits of each approach. Given the small size we recommended observation.  We will plan a repeat MRI and audiogram in 6 months  I encouraged Ms. Posada to contact with any questions or concerns or if we may be of assistance in any way.      It was a pleasure to participate in the care of your patient. Please feel free to contact me at any point if I can be of any assistance for Ms. Posada.    Sincerely,  Gallo Rothman MD       25 minutes spent on the date of the encounter doing chart review, review of outside records, review of test results, interpretation of tests, patient visit, documentation and discussion with other provider(s)

## 2024-03-12 ENCOUNTER — OFFICE VISIT (OUTPATIENT)
Dept: OTOLARYNGOLOGY | Facility: CLINIC | Age: 69
End: 2024-03-12
Payer: COMMERCIAL

## 2024-03-12 ENCOUNTER — PRE VISIT (OUTPATIENT)
Dept: OTOLARYNGOLOGY | Facility: CLINIC | Age: 69
End: 2024-03-12

## 2024-03-12 VITALS
BODY MASS INDEX: 29.71 KG/M2 | DIASTOLIC BLOOD PRESSURE: 93 MMHG | OXYGEN SATURATION: 97 % | TEMPERATURE: 97.9 F | HEIGHT: 64 IN | WEIGHT: 174 LBS | SYSTOLIC BLOOD PRESSURE: 150 MMHG | HEART RATE: 97 BPM

## 2024-03-12 DIAGNOSIS — H90.3 ASYMMETRICAL SENSORINEURAL HEARING LOSS: ICD-10-CM

## 2024-03-12 DIAGNOSIS — D32.9 MENINGIOMA (H): Primary | ICD-10-CM

## 2024-03-12 PROCEDURE — 99203 OFFICE O/P NEW LOW 30 MIN: CPT | Performed by: NEUROLOGICAL SURGERY

## 2024-03-12 PROCEDURE — 99214 OFFICE O/P EST MOD 30 MIN: CPT | Mod: GC | Performed by: OTOLARYNGOLOGY

## 2024-03-12 ASSESSMENT — PAIN SCALES - GENERAL: PAINLEVEL: NO PAIN (0)

## 2024-03-12 NOTE — LETTER
[facility]  [facility] [address]  4TH FLOOR  [facility] [address]  Phone: [phone]  Fax: [phone]          3/12/2024    RE:   Kathy Posada  [address] [place] [address]      Dear Colleague,    Thank you for referring your patient, Kathy Posada, to the [facility]. Please see a copy of my visit note below.      [facility]  Department of Neurosurgery    Name: Kathy Posada  MRN: [id number]  : 1955     Reason for visit:  left petrous meningioma, new patient visit    Dear Dr. Mcnally and Dr. Lynch,    It was a pleasure to see Ms. Posada in the [facility] today.  I have reviewed the referral notes and imaging and have summarized our visit here. As you recall, Ms. Posada is a pleasant 68 year-old female who presented with abrupt unilateral hearing loss and aural fullness in the left ear, starting summer of 2022. 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 She was treated with steroids for her hearing loss but it did not recover fully.      Review of Systems:   Pertinent items are noted in HPI or as in patient entered ROS below, remainder of complete ROS is negative.     Medications:  atorvastatin, clobetasol, metformin, semaglutide     Allergies: Amoxicillin      Past Medical History: lichen sclerosus, hyperlipidemia, diabetes, appendectomy, cholecystectomy    Family History: CAD in parents     Social History: lifetime nonsmoker     Physical Exam:   General: No acute distress.   Head: No signs of trauma.    Eyes: Conjunctivae are normal.  Mouth/Throat: Oropharynx moist.  Neck: Normal range of motion.    Resp: No respiratory distress.   MSK: Moves all extremities.  No obvious  deformity.  Neuro: The patient is fully oriented and quite pleasant. Speech normal. Extraocular movements are intact without nystagmus. Facial sensation is intact in V1, V2, V3 distributions. Facial nerve function is normal, rated as a House Brackmann 1, without synkinesis.  Palate is symmetric. Shoulders are full strength. Tongue is midline. There is no pronator drift. Full strength in all extremities. Sensation intact throughout.  Psych: Normal mood and affect. Behavior is normal.      Audiogram:  We reviewed the audiogram 1/29/2024 which shows:  Right PTA 12 dB,  % at 60 dB  Left PTA 62 dB, WRS 76 % at 90 dB    Imaging:  We reviewed the MRI from 2/20/2024 which shows a left cerebellopontine angle tumor with origin at the posterior petrous dura, measuring 9 x 6mm most consistent with a meningioma.     Assessment:  Left petrous meningioma  2.  AAO HNS hearing class C    Plan:  We reviewed the patient's history, imaging, natural history and expected outcomes of conservative management and intervention. Options include observation, radiosurgery, and surgical resection and we reviewed the risks and benefits of each approach. Given the small size we recommended observation.  We will plan a repeat MRI and audiogram in 6 months  I encouraged Ms. Posada to contact with any questions or concerns or if we may be of assistance in any way.      It was a pleasure to participate in the care of your patient. Please feel free to contact me at any point if I can be of any assistance for Ms. Posaad.    Sincerely,  Gallo Rothman MD       25 minutes spent on the date of the encounter doing chart review, review of outside records, review of test results, interpretation of tests, patient visit, documentation and discussion with other provider(s)          Again, thank you for allowing me to participate in the care of your patient.      Sincerely,    Gallo Rothman MD

## 2024-03-12 NOTE — PATIENT INSTRUCTIONS
You were seen today with Dr. Suzanne Griffith and Dr. Gallo Rothman. Your primary contact after today's visit is: TOR James    Next steps:  Please return to clinic in 6 months with updated MRI and audiogram.         How to Contact Us  Send a [employer] message to your provider.   Call the clinic - your call will be routed appropriately.   ENT Clinic: [phone]   Neurosurgery Clinic: [phone]  To speak directly to an RN Care Coordinator:  Erika RN: [phone]  Zaida RN: [phone]    Note: We do our best to check voicemail frequently throughout the day and make every effort to return calls within 1-2 business days. For urgent matters, please use the general clinic phone numbers listed above.

## 2024-03-12 NOTE — PROGRESS NOTES
[place] [facility]      Name: Kathy Posada  MRN: [id number]  Age: 68 year old  : 1955  Referring provider: Dr. Sandor Mcnally  2024      Chief Complaint:   Consultation regarding meningioma    History of Present Illness:   Kathy Posada is a 68 year old female with a history of left hearing loss and tinnitus who was seen in the [facility] for consultation requested by Dr. Mcnally regarding a newly diagnosed left CPA meningioma.  She is seen in conjunction with my colleague in neurosurgery, Dr. Gallo Rothman.     She initially noticed left sided hearing loss after COVID in 2022. The hearing decreased bilaterally when he was sick but then the left side never improved. Symptoms were initially suspected to be secondary to Meniere's disease as she presented with a low frequency hearing hearing loss and tinnitus. Over time, she progressed to having more hearing loss. She did not have any improvement with oral or intratympanic steroids. An MRI was obtained demonstrating a left CPA lesion most consistent with meningioma and she was referred for further evaluation.     She has been wearing a hearing aid in the left ear and is very happy with the result. She does not have any dizziness or balance symptoms.     Review of Systems:   Pertinent items are noted in HPI or as in patient entered ROS below, remainder of complete ROS is negative.       3/9/2024    10:35 AM    ENT ROS   Ears, Nose, Throat Hearing loss        Active Medications:     Current Outpatient Medications:     atorvastatin (LIPITOR) 40 MG tablet, Take 1 tablet (40 mg) by mouth at bedtime, Disp: 90 tablet, Rfl: 2    blood glucose (NO BRAND SPECIFIED) test strip, 1 strip by In Vitro route daily Use to test blood sugar 1-2 times daily or as directed. Contour test strip, Disp: 100 strip, Rfl: 3    clobetasol (TEMOVATE) 0.05 % external ointment, [CLOBETASOL (TEMOVATE) 0.05 % OINTMENT]  "APPLY TO AFFECTED SKIN AS INSTRUCTED Strength: 0.05 %, Disp: 60 g, Rfl: 1    metFORMIN (GLUCOPHAGE) 500 MG tablet, Take 2 tablets (1,000 mg) by mouth 2 times daily (with meals), Disp: 360 tablet, Rfl: 1    Semaglutide (RYBELSUS) 7 MG tablet, Take 1 tablet (7 mg) by mouth daily, Disp: 90 tablet, Rfl: 1      Allergies:   Amoxicillin      Past Medical History:  Past Medical History:   Diagnosis Date    Lichen sclerosus      Patient Active Problem List   Diagnosis    Hyperlipidemia    Diabetes type 2, controlled (H)    Lichen sclerosus of female genitalia    Controlled type 2 diabetes mellitus with hyperglycemia, without long-term current use of insulin (H)    Type 2 diabetes mellitus without complication, without long-term current use of insulin (H)        Past Surgical History:  Past Surgical History:   Procedure Laterality Date    APPENDECTOMY      CHOLECYSTECTOMY         Family History:   Family History   Problem Relation Age of Onset    Coronary Artery Disease Mother 85.00         at age 86    Coronary Artery Disease Father 70.00        Heart attack in 70s,  at age 91    No Known Problems Sister     No Known Problems Daughter     No Known Problems Maternal Grandmother     No Known Problems Maternal Grandfather     No Known Problems Paternal Grandmother     No Known Problems Paternal Grandfather     No Known Problems Maternal Aunt     No Known Problems Paternal Aunt     Hereditary Breast and Ovarian Cancer Syndrome No family hx of     Breast Cancer No family hx of     Cancer No family hx of     Colon Cancer No family hx of     Endometrial Cancer No family hx of     Ovarian Cancer No family hx of          Social History:   Social History     Tobacco Use    Smoking status: Never    Smokeless tobacco: Never   Substance Use Topics    Alcohol use: Never    Drug use: Never        Physical Exam:   BP (!) 150/93   Pulse 97   Temp 97.9  F (36.6  C)   Ht 1.626 m (5' 4\")   Wt 78.9 kg (174 lb)   LMP  (LMP Unknown)  "  SpO2 97%   BMI 29.87 kg/m       Constitutional:  The patient was accompanied by her , well-groomed, and in no acute distress.     Skin: Normal:  warm and pink without rash   Neurologic: Alert and oriented x 3.  CN's III-XII within normal limits.  Voice normal.    Psychiatric: The patient's affect was calm, cooperative, and appropriate.     Communication:  Normal; communicates verbally, normal voice quality.   Respiratory: Breathing comfortably without stridor or exertion of accessory muscles.    Head/Face:  No lesions or scars. No sinus tenderness.    Salivary glands -  Normal size, no tenderness, swelling, or palpable masses   Eyes: Pupils were equal and reactive.  Extraocular movement intact.     Ears: Pinnae and tragus non-tender.  EAC's and TM's were clear.          Audiogram:  AUDIOGRAM: She underwent an audiogram on 01/29/24. This demonstrated:  Tymps: Normal mobility, bilat. Reflexes: Present in all conditions ipsi and contra 1000 Hz. Audio: Right  normal hearing. Left moderate to moderately-severe SNHL (with slight conductive component 250 Hz). Thresholds have dropped 20-25 dB  left 8933-4199 Hz from 7/17/23. Word rec: Has declined left meeting significance. (Could not tolerate as loud as 100 left)  REC: Follow up per ENT. Follow up with hearing aid audiologist to adjust hearing aid according to change in hearing.        Right: Speech reception threshold is 10 dB with 100% word recognition. Tympanogram A type   Left: Speech reception threshold is 65 dB with 68% word recognition. Tympanogram A type     Audiogram was independently reviewed    Imaging:  Brain MRI 02/20/24  IMPRESSION:  1.  Extra-axial enhancing lesion within the left cerebellopontine angle measuring 9 x 6 mm which most likely represents a meningioma.  2.  No other evidence of abnormal contrast enhancement or mass within the cerebellopontine angles or internal auditory canals.    MRI Brain images were independently reviewed. The lesion  is posterior to the IAC and contacts the VIIIth nerve bundle in the CPA.  It is based on the petrous face and has a dural tail.  Does not extend into the IAC.  Most consistent with meningioma.    Outside records from [facility]NT and Dr. Mcnally were independently reviewed.        Assessment and Plan:  Kathy Posada is a 68 year old female who was seen in the [facility] for consultation kindly requested by Dr. Mcnally regarding left posterior petrous face/CPA meningioma. Dr. Rothman and I discussed the treatment options of watchful waiting, surgical resection, and radiosurgery.  We reviewed the pros and cons of each approach, including the range of outcomes, benefits, side effects, and complications.    We reviewed the diagnosis and typical natural history of meningioma, specifically in context of having a lesion that has already caused hearing loss. We reviewed the involvement of the meninges as the nerve origin for the tumor. We discussed that the tumor is benign in nature, that it is the most common tumor found in the intracranial space, and is typically slow growing, at a rate of about 1 mm per year.      Treatment options and all risks and benefits of  each were discussed, including observation, radiosurgery, and surgical resection. The patient has all options available.      It is a reasonable option to obtain another scan in 6 months to one year and continue watchful waiting.  We would get a scan before then if she developed worsening neurologic symptoms.  If the tumor is not significantly growing and her hearing is stable, we may continue to follow with watchful waiting.      We reviewed the goal of radiosurgery being to stop tumor growth rather than to remove the lesion. We reviewed what this outpatient procedure entails. Success rates are in the 80% range or higher for a tumor of this size, and those with growth do not necessarily go on to have surgery if the tumor remains  relatively small, but surgery can be necessary for abundant growth. The risks and side effects of radiosurgery are typically delayed in onset but include facial paralysis (about 1% risk), dizziness, progressive hearing loss, and risk to surrounding structures.     We also reviewed microsurgical excision via retromastoid approach. We discussed that the surgery has risks of worsening hearing or facial movement. There are also risks of spinal fluid leak, infection, other neurologic dysfunction, and rare risks of stroke, coma, or death. We also discussed length of hospital stay averages 3 to 5 days, and recovery time up to 6 to 12 weeks with activity restrictions.    We also discussed the long term hearing and balance recommendations. She may continue to have progression of hearing loss which can be managed with a hearing aid. She does not currently have any balance symptoms but if she develops any symptoms, she may call us to discuss PT.      The patient asked insightful questions and indicated that she would like to pursue observation at this time and will return to clinic in 6 months with new audio and MRI. The patient understands that they should return to clinic promptly if experiencing worsening or new symptoms including dizziness, facial numbness, headache, or changes in vision.  The patient expressed understanding and is in agreement with this plan.  All questions were answered.    Follow Up: 6 months with audiogram and imaging.     Erika Deal MD  Fellow Physician  Otology & Neurotology  [facility]     Suzanne Griffith MD  Otology & Neurotology  [facility]    I, Suzanne Griffith MD, saw this patient with the resident/fellow and agree with the resident's findings and plan of care as documented in the resident's/fellow's note.

## 2024-03-12 NOTE — LETTER
3/12/2024       RE: Kathy Posada  [address] [place] [address]     Dear Colleague,    Thank you for referring your patient, Kathy Posada, to the [facility] [place] at [facility]. Please see a copy of my visit note below.      [facility]      Name: Kathy Posada  MRN: [id number]  Age: 68 year old  : 1955  Referring provider: Dr. Sandor Mcnally  2024      Chief Complaint:   Consultation regarding meningioma    History of Present Illness:   Kathy Posada is a 68 year old female with a history of left hearing loss and tinnitus who was seen in the [facility] for consultation requested by Dr. Mcnally regarding a newly diagnosed left CPA meningioma.  She is seen in conjunction with my colleague in neurosurgery, Dr. Gallo Rothman.     She initially noticed left sided hearing loss after COVID in 2022. The hearing decreased bilaterally when he was sick but then the left side never improved. Symptoms were initially suspected to be secondary to Meniere's disease as she presented with a low frequency hearing hearing loss and tinnitus. Over time, she progressed to having more hearing loss. She did not have any improvement with oral or intratympanic steroids. An MRI was obtained demonstrating a left CPA lesion most consistent with meningioma and she was referred for further evaluation.     She has been wearing a hearing aid in the left ear and is very happy with the result. She does not have any dizziness or balance symptoms.     Review of Systems:   Pertinent items are noted in HPI or as in patient entered ROS below, remainder of complete ROS is negative.       3/9/2024    10:35 AM    ENT ROS   Ears, Nose, Throat Hearing loss        Active Medications:     Current Outpatient Medications:      atorvastatin (LIPITOR) 40 MG tablet, Take 1 tablet (40  mg) by mouth at bedtime, Disp: 90 tablet, Rfl: 2     blood glucose (NO BRAND SPECIFIED) test strip, 1 strip by In Vitro route daily Use to test blood sugar 1-2 times daily or as directed. Contour test strip, Disp: 100 strip, Rfl: 3     clobetasol (TEMOVATE) 0.05 % external ointment, [CLOBETASOL (TEMOVATE) 0.05 % OINTMENT] APPLY TO AFFECTED SKIN AS INSTRUCTED Strength: 0.05 %, Disp: 60 g, Rfl: 1     metFORMIN (GLUCOPHAGE) 500 MG tablet, Take 2 tablets (1,000 mg) by mouth 2 times daily (with meals), Disp: 360 tablet, Rfl: 1     Semaglutide (RYBELSUS) 7 MG tablet, Take 1 tablet (7 mg) by mouth daily, Disp: 90 tablet, Rfl: 1      Allergies:   Amoxicillin      Past Medical History:  Past Medical History:   Diagnosis Date     Lichen sclerosus      Patient Active Problem List   Diagnosis     Hyperlipidemia     Diabetes type 2, controlled (H)     Lichen sclerosus of female genitalia     Controlled type 2 diabetes mellitus with hyperglycemia, without long-term current use of insulin (H)     Type 2 diabetes mellitus without complication, without long-term current use of insulin (H)        Past Surgical History:  Past Surgical History:   Procedure Laterality Date     APPENDECTOMY       CHOLECYSTECTOMY         Family History:   Family History   Problem Relation Age of Onset     Coronary Artery Disease Mother 85.00         at age 86     Coronary Artery Disease Father 70.00        Heart attack in 70s,  at age 91     No Known Problems Sister      No Known Problems Daughter      No Known Problems Maternal Grandmother      No Known Problems Maternal Grandfather      No Known Problems Paternal Grandmother      No Known Problems Paternal Grandfather      No Known Problems Maternal Aunt      No Known Problems Paternal Aunt      Hereditary Breast and Ovarian Cancer Syndrome No family hx of      Breast Cancer No family hx of      Cancer No family hx of      Colon Cancer No family hx of      Endometrial Cancer No family hx of       "Ovarian Cancer No family hx of          Social History:   Social History     Tobacco Use     Smoking status: Never     Smokeless tobacco: Never   Substance Use Topics     Alcohol use: Never     Drug use: Never        Physical Exam:   BP (!) 150/93   Pulse 97   Temp 97.9  F (36.6  C)   Ht 1.626 m (5' 4\")   Wt 78.9 kg (174 lb)   LMP  (LMP Unknown)   SpO2 97%   BMI 29.87 kg/m       Constitutional:  The patient was accompanied by her , well-groomed, and in no acute distress.     Skin: Normal:  warm and pink without rash   Neurologic: Alert and oriented x 3.  CN's III-XII within normal limits.  Voice normal.    Psychiatric: The patient's affect was calm, cooperative, and appropriate.     Communication:  Normal; communicates verbally, normal voice quality.   Respiratory: Breathing comfortably without stridor or exertion of accessory muscles.    Head/Face:  No lesions or scars. No sinus tenderness.    Salivary glands -  Normal size, no tenderness, swelling, or palpable masses   Eyes: Pupils were equal and reactive.  Extraocular movement intact.     Ears: Pinnae and tragus non-tender.  EAC's and TM's were clear.          Audiogram:  AUDIOGRAM: She underwent an audiogram on 01/29/24. This demonstrated:  Tymps: Normal mobility, bilat. Reflexes: Present in all conditions ipsi and contra 1000 Hz. Audio: Right  normal hearing. Left moderate to moderately-severe SNHL (with slight conductive component 250 Hz). Thresholds have dropped 20-25 dB  left 6508-2677 Hz from 7/17/23. Word rec: Has declined left meeting significance. (Could not tolerate as loud as 100 left)  REC: Follow up per ENT. Follow up with hearing aid audiologist to adjust hearing aid according to change in hearing.        Right: Speech reception threshold is 10 dB with 100% word recognition. Tympanogram A type   Left: Speech reception threshold is 65 dB with 68% word recognition. Tympanogram A type     Audiogram was independently " reviewed    Imaging:  Brain MRI 02/20/24  IMPRESSION:  1.  Extra-axial enhancing lesion within the left cerebellopontine angle measuring 9 x 6 mm which most likely represents a meningioma.  2.  No other evidence of abnormal contrast enhancement or mass within the cerebellopontine angles or internal auditory canals.    MRI Brain images were independently reviewed. The lesion is posterior to the IAC and contacts the VIIIth nerve bundle in the CPA.  It is based on the petrous face and has a dural tail.  Does not extend into the IAC.  Most consistent with meningioma.    Outside records from [facility] ENT and Dr. Mcnally were independently reviewed.        Assessment and Plan:  Kathy Posada is a 68 year old female who was seen in the [facility] for consultation kindly requested by Dr. Mcnally regarding left posterior petrous face/CPA meningioma. Dr. Rothman and I discussed the treatment options of watchful waiting, surgical resection, and radiosurgery.  We reviewed the pros and cons of each approach, including the range of outcomes, benefits, side effects, and complications.    We reviewed the diagnosis and typical natural history of meningioma, specifically in context of having a lesion that has already caused hearing loss. We reviewed the involvement of the meninges as the nerve origin for the tumor. We discussed that the tumor is benign in nature, that it is the most common tumor found in the intracranial space, and is typically slow growing, at a rate of about 1 mm per year.      Treatment options and all risks and benefits of  each were discussed, including observation, radiosurgery, and surgical resection. The patient has all options available.      It is a reasonable option to obtain another scan in 6 months to one year and continue watchful waiting.  We would get a scan before then if she developed worsening neurologic symptoms.  If the tumor is not significantly growing and her  hearing is stable, we may continue to follow with watchful waiting.      We reviewed the goal of radiosurgery being to stop tumor growth rather than to remove the lesion. We reviewed what this outpatient procedure entails. Success rates are in the 80% range or higher for a tumor of this size, and those with growth do not necessarily go on to have surgery if the tumor remains relatively small, but surgery can be necessary for abundant growth. The risks and side effects of radiosurgery are typically delayed in onset but include facial paralysis (about 1% risk), dizziness, progressive hearing loss, and risk to surrounding structures.     We also reviewed microsurgical excision via retromastoid approach. We discussed that the surgery has risks of worsening hearing or facial movement. There are also risks of spinal fluid leak, infection, other neurologic dysfunction, and rare risks of stroke, coma, or death. We also discussed length of hospital stay averages 3 to 5 days, and recovery time up to 6 to 12 weeks with activity restrictions.    We also discussed the long term hearing and balance recommendations. She may continue to have progression of hearing loss which can be managed with a hearing aid. She does not currently have any balance symptoms but if she develops any symptoms, she may call us to discuss PT.      The patient asked insightful questions and indicated that she would like to pursue observation at this time and will return to clinic in 6 months with new audio and MRI. The patient understands that they should return to clinic promptly if experiencing worsening or new symptoms including dizziness, facial numbness, headache, or changes in vision.  The patient expressed understanding and is in agreement with this plan.  All questions were answered.    Follow Up: 6 months with audiogram and imaging.     Erika Deal MD  Fellow Physician  Otology & Neurotology  [facility]     Suzanne Griffith,  MD  Otology & Neurotology  [facility]    I, Suzanne Griffith MD, saw this patient with the resident/fellow and agree with the resident's findings and plan of care as documented in the resident's/fellow's note.           Again, thank you for allowing me to participate in the care of your patient.      Sincerely,    Suzanne Griffith MD

## 2024-03-12 NOTE — LETTER
3/12/2024       RE: Kathy Posada  [address] [place] [address]     Dear Colleague,    Thank you for referring your patient, Kathy Posada, to the [facility] EAR NOSE AND THROAT CLINIC [place] at [facility]. Please see a copy of my visit note below.    [facility]  Department of Neurosurgery    Name: Kathy Posada  MRN: [id number]  : 1955     Reason for visit:  left petrous meningioma, new patient visit    Dear Dr. Mcnally and Dr. Lynch,    It was a pleasure to see Ms. Posada in the [facility] today.  I have reviewed the referral notes and imaging and have summarized our visit here. As you recall, Ms. Posada is a pleasant 68 year-old female who presented with abrupt unilateral hearing loss and aural fullness in the left ear, starting summer of 2022. 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 She was treated with steroids for her hearing loss but it did not recover fully.      Review of Systems:   Pertinent items are noted in HPI or as in patient entered ROS below, remainder of complete ROS is negative.     Medications:  atorvastatin, clobetasol, metformin, semaglutide     Allergies: Amoxicillin      Past Medical History: lichen sclerosus, hyperlipidemia, diabetes, appendectomy, cholecystectomy    Family History: CAD in parents     Social History: lifetime nonsmoker     Physical Exam:   General: No acute distress.   Head: No signs of trauma.    Eyes: Conjunctivae are normal.  Mouth/Throat: Oropharynx moist.  Neck: Normal range of motion.    Resp: No respiratory distress.   MSK: Moves all extremities.  No obvious deformity.  Neuro: The patient is fully oriented and quite pleasant. Speech normal. Extraocular movements are intact without nystagmus. Facial sensation is  intact in V1, V2, V3 distributions. Facial nerve function is normal, rated as a House Brackmann 1, without synkinesis.  Palate is symmetric. Shoulders are full strength. Tongue is midline. There is no pronator drift. Full strength in all extremities. Sensation intact throughout.  Psych: Normal mood and affect. Behavior is normal.      Audiogram:  We reviewed the audiogram 1/29/2024 which shows:  Right PTA 12 dB,  % at 60 dB  Left PTA 62 dB, WRS 76 % at 90 dB    Imaging:  We reviewed the MRI from 2/20/2024 which shows a left cerebellopontine angle tumor with origin at the posterior petrous dura, measuring 9 x 6mm most consistent with a meningioma.     Assessment:  Left petrous meningioma  2.  AAO HNS hearing class C    Plan:  We reviewed the patient's history, imaging, natural history and expected outcomes of conservative management and intervention. Options include observation, radiosurgery, and surgical resection and we reviewed the risks and benefits of each approach. Given the small size we recommended observation.  We will plan a repeat MRI and audiogram in 6 months  I encouraged Ms. Posada to contact with any questions or concerns or if we may be of assistance in any way.      It was a pleasure to participate in the care of your patient. Please feel free to contact me at any point if I can be of any assistance for Ms. Posada.    Sincerely,  Gallo Rothman MD       25 minutes spent on the date of the encounter doing chart review, review of outside records, review of test results, interpretation of tests, patient visit, documentation and discussion with other provider(s)        Again, thank you for allowing me to participate in the care of your patient.      Sincerely,    Gallo Rothman MD

## 2024-03-12 NOTE — NURSING NOTE
"Chief Complaint   Patient presents with    Consult     Meningioma      Blood pressure (!) 150/93, pulse 97, temperature 97.9  F (36.6  C), height 1.626 m (5' 4\"), weight 78.9 kg (174 lb), SpO2 97%.  Jose Angel Mesa LPN    "

## 2024-03-27 ENCOUNTER — PATIENT OUTREACH (OUTPATIENT)
Dept: CARE COORDINATION | Facility: CLINIC | Age: 69
End: 2024-03-27
Payer: COMMERCIAL

## 2024-03-28 DIAGNOSIS — E11.9 TYPE 2 DIABETES MELLITUS WITHOUT COMPLICATION, WITHOUT LONG-TERM CURRENT USE OF INSULIN (H): ICD-10-CM

## 2024-04-09 ENCOUNTER — PATIENT OUTREACH (OUTPATIENT)
Dept: FAMILY MEDICINE | Facility: CLINIC | Age: 69
End: 2024-04-09
Payer: COMMERCIAL

## 2024-04-09 NOTE — TELEPHONE ENCOUNTER
Patient Quality Outreach    Patient is due for the following:   Physical Annual Wellness Visit    Next Steps:   Patient has upcoming appointment, these items will be addressed at that time.    Type of outreach:    Chart review performed, no outreach needed.      Questions for provider review:    None           Jennifer Lara MA

## 2024-04-24 ENCOUNTER — PATIENT OUTREACH (OUTPATIENT)
Dept: CARE COORDINATION | Facility: CLINIC | Age: 69
End: 2024-04-24
Payer: COMMERCIAL

## 2024-05-09 DIAGNOSIS — E11.9 TYPE 2 DIABETES MELLITUS WITHOUT COMPLICATION, WITHOUT LONG-TERM CURRENT USE OF INSULIN (H): ICD-10-CM

## 2024-05-11 ENCOUNTER — HEALTH MAINTENANCE LETTER (OUTPATIENT)
Age: 69
End: 2024-05-11

## 2024-05-28 DIAGNOSIS — E11.9 TYPE 2 DIABETES MELLITUS WITHOUT COMPLICATION, WITHOUT LONG-TERM CURRENT USE OF INSULIN (H): Primary | ICD-10-CM

## 2024-05-29 ENCOUNTER — ALLIED HEALTH/NURSE VISIT (OUTPATIENT)
Dept: EDUCATION SERVICES | Facility: CLINIC | Age: 69
End: 2024-05-29
Payer: COMMERCIAL

## 2024-05-29 DIAGNOSIS — E11.9 TYPE 2 DIABETES MELLITUS WITHOUT COMPLICATION, WITHOUT LONG-TERM CURRENT USE OF INSULIN (H): ICD-10-CM

## 2024-05-29 LAB — HBA1C MFR BLD: 7.5 % (ref 0–5.6)

## 2024-05-29 PROCEDURE — 83036 HEMOGLOBIN GLYCOSYLATED A1C: CPT

## 2024-05-29 PROCEDURE — G0108 DIAB MANAGE TRN  PER INDIV: HCPCS

## 2024-05-29 NOTE — PATIENT INSTRUCTIONS
1. Eat 3 balanced meals each day - Monitor carb intake and limit to 45-60 grams per meal  This would be equal to 3-4 choices ~  1 choice = 15 grams    Do not wait longer than 4-5 hours to eat something  Snacks limit to no more than 30 grams of carbohydrates or 2 choices  Make sure you include protein source with each meal and at bedtime - this has been shown to help with blood glucose elevations    2.  Check blood sugars once each day - please try and alternate the times you check between am fasting( right after you wake before eating) and 2 hours AFTER dinner - this allows us to get a better idea of what is happening throughout your day , not at just one time     Fasting and before meal target is 80 - 130   2 hours after a meal target is < 180  remember to bring meter and log book to all appointments    3. Incorporate 30 minutes activity into each day - does not need to be all at one time & walking counts    4. Take diabetes medications as prescribed Continue Metformin 1000 mg twice daily and Rybelsus 7 mg daily     A1c tody was 7.5 % -- WOOOOOOOO DOMINICKOOOOHARISH   KEEP IT UP !!!  EVEN LOWER NEXT TIME !!!       Thank you for coming in to see me today !      I value your experience and would be very thankful for your time in providing feedback in our clinic survey.    You may receive an e-mail, text message or even something in the mail from [facility].  This is a survey to let us know how we are doing - the survey will be related to your diabetes education and me.

## 2024-05-29 NOTE — LETTER
5/29/2024         RE: Kathy Posada  [address] [place] [address]        Dear Colleague,    Thank you for referring your patient, Kathy Posada, to the [facility]. Please see a copy of my visit note below.    Diabetes Self-Management Education & Support    Presents for: Individual review    Type of Service: In Person Visit      ASSESSMENT:  Kathy is a very pleasant 68-year-old who returns to clinic today to review blood glucose, medications recheck A1c, and to assess/assist her with her current diabetes self-management skills with an A1c not meeting ADA goal.  She arrived today unaccompanied and had her glucose meter with her.  Medications were reviewed and Kathy confirms she is currently taking 1000 mg of metformin p.o. twice daily, and 7 mg of Rybelsus p.o. daily, both medications with no missed or skipped doses.  Prior to our visit today we did recheck her A1c and was very pleased to see it returned even lower at 7.5% from a previous 7.7%.  Kathy actually stated she was surprised that it was lower as the last 3 months have been pretty stressful for her regarding some health issues she has been experiencing and she has not been taking care of herself as she knew she should probably be.   I congratulated her on her A1c and let her know that despite the added stressors she still was obviously doing something to keep her on the right track.  Her  has opened up their pool and she is very excited to get back to swimming during the summer and going to her cabin on the weekends.  She will be following up with her primary care provider in June and I will meet with her again in clinic in September.  I instructed her to call with any questions or concerns that come up before that time.  Patient's most recent   Lab Results   Component Value Date    A1C 7.5 05/29/2024     is meeting goal of  7-7.5%    Diabetes knowledge and skills assessment:   Patient is knowledgeable in  diabetes management concepts related to: Healthy Eating, Being Active, Monitoring, Taking Medication, and Reducing Risks    Continue education with the following diabetes management concepts: Healthy Eating, Being Active, Reducing Risks, and Healthy Coping    Based on learning assessment above, most appropriate setting for further diabetes education would be: Individual setting.      PLAN    See Patient Instructions for co-developed, patient-stated behavior change goals.  AVS printed and provided to patient today. See Follow-Up section for recommended follow-up.       Topics to cover at upcoming visits: Healthy Eating, Being Active, Reducing Risks, and Healthy Coping    Follow-up: 9/4/24    See Care Plan for co-developed, patient-state behavior change goals.  AVS provided for patient today.    Education Materials Provided:  No new materials provided today      SUBJECTIVE/OBJECTIVE:  Presents for: Individual review  Accompanied by: Self  Diabetes education in the past 24 mo: Yes ([facility] 9/27/23)  Focus of Visit: Monitoring, Assistance w/ making life changes, Self-care behavioral goal setting, Taking Medication, Reducing Risks  Diabetes type: Type 2  Date of diagnosis: 2017  Disease course: Improving (A1c today 7.7% from previous 8.2%)  How confident are you filling out medical forms by yourself:: Extremely  Difficulty affording diabetes medication?: Sometimes (currently in [facility])  Difficulty affording diabetes testing supplies?: No  Other concerns:: None  Cultural Influences/Ethnic Background:  Not  or       Diabetes Symptoms & Complications:  Diabetes Related Symptoms: None  Weight trend: Stable  Symptom course: Stable  Disease course: Improving (A1c today 7.7% from previous 8.2%)  Complications assessed today?: No    Patient Problem List and Family Medical History reviewed for relevant medical history, current medical status, and diabetes risk factors.    Vitals:  Wt 77.1 kg (170 lb)   LMP  (LMP  "Unknown)   BMI 29.18 kg/m    Estimated body mass index is 29.18 kg/m  as calculated from the following:    Height as of 3/12/24: 1.626 m (5' 4\").    Weight as of this encounter: 77.1 kg (170 lb).   Last 3 BP:   BP Readings from Last 3 Encounters:   03/12/24 (!) 150/93   02/26/24 133/74   11/07/23 (!) 140/82       History   Smoking Status     Never   Smokeless Tobacco     Never       Labs:  Lab Results   Component Value Date    A1C 7.5 05/29/2024     Lab Results   Component Value Date     02/26/2024     03/22/2022     Lab Results   Component Value Date    LDL 95 02/26/2024     Direct Measure HDL   Date Value Ref Range Status   02/26/2024 54 >=50 mg/dL Final   ]  GFR Estimate   Date Value Ref Range Status   02/26/2024 71 >60 mL/min/1.73m2 Final   08/27/2020 >60 >60 mL/min/1.73m2 Final     GFR Estimate If Black   Date Value Ref Range Status   08/27/2020 >60 >60 mL/min/1.73m2 Final     Lab Results   Component Value Date    CR 0.89 02/26/2024     No results found for: \"MICROALBUMIN\"    Healthy Eating:  Healthy Eating Assessed Today: Yes  Cultural/[employer] diet restrictions?: No  Do you have any food allergies or intolerances?: No  Meal planning/habits: Avoiding sweets, Low salt, Smaller portions  Who cooks/prepares meals for you?: Self  Who purchases food in  your home?: Self  How many times a week on average do you eat food made away from home (restaurant/take-out)?: 3  Meals include: Lunch, Dinner, Evening Snack  Breakfast: Kashi cereal or Oatmeal or PB toast - WE might have eggs/matthews  Lunch: cup of soup or 1/2 sand or leftovers - sometimes just cheese and crackers  Dinner: + protein + vegetable + starch  Snacks: evenings - popcorn, veggies and dip, cheese and crackers , once in awhile 1 small scoop of ice cream  day- apples  Other: getting better with portions and food choices - saying no -- decreased appetite from Rybelsus  Beverages: Water, Tea (milk with cereal, SF IT)  Has patient met with a " dietitian in the past?: No    Being Active:  Being Active Assessed Today: Yes  Exercise:: Yes  Days per week of moderate to strenuous exercise (like a brisk walk): 2 (walking in mall once a week - has pool at home and will swim regularly in months weather permits)  On average, minutes per day of exercise at this level: 30  How intense was your typical exercise? : Light (like stretching or slow walking) (walking in mall 2x/week - 30 min-   has opened up their pool last week and she is very excited about this)  Exercise Minutes per Week: 60  Barrier to exercise: None    Monitoring:  Monitoring Assessed Today: Yes  Did patient bring glucose meter to appointment? : Yes (and logbook)  Blood Glucose Meter: ContourNext  Times checking blood sugar at home (number): 3  Times checking blood sugar at home (per): Week (am fasting)  Blood glucose trend: Decreasing    The following are Kathy's most recent blood glucose readings that were taken from her meter today.    FB, 135, 144, 136  2-hour post dinner: 130, 171, 153, 172, 158, 137    These readings were reviewed and discussed with Kathy during her visit today.  Blood glucose and glycemic control continues to improve despite the stressors she has faced and this was indeed reflected on her A1c we did today.    Taking Medications:  Diabetes Medication(s)       Biguanides       metFORMIN (GLUCOPHAGE) 500 MG tablet Take 2 tablets (1,000 mg) by mouth 2 times daily (with meals)       Incretin Mimetic Agents       Semaglutide (RYBELSUS) 7 MG tablet Take 1 tablet (7 mg) by mouth daily            Taking Medication Assessed Today: Yes  Current Treatments: Oral Medication (taken by mouth)  Problems taking diabetes medications regularly?: No (cost is a barrier- Rybelsus is expensive)  Diabetes medication side effects?: No    Problem Solving:  Problem Solving Assessed Today: Yes  Is the patient at risk for hypoglycemia?: No  Is the patient at risk for DKA?: No  Does patient  have severe weather/disaster plan for diabetes management?: Not Needed  Does patient have sick day plan for diabetes management?: Not Needed    Hypoglycemia Symptoms  Hypoglycemia: None    Hypoglycemia Complications  Hypoglycemia Complications: None    Reducing Risks:  Reducing Risks Assessed Today: Yes  Diabetes Risks: Age over 45 years, Sedentary Lifestyle, Hyperlipidemia  CAD Risks: Diabetes Mellitus, Obesity, Stress, Post-menopausal, Sedentary lifestyle, Dyslipidemia  Has dilated eye exam at least once a year?: Yes  Sees dentist every 6 months?: Yes  Feet checked by healthcare provider in the last year?: Yes    Healthy Coping:  Healthy Coping Assessed Today: Yes  Emotional response to diabetes: Guilt/Self-blame, Concern for health and well-being  Informal Support system:: Spouse  Stage of change: ACTION (Actively working towards change)  Support resources: In-person Offerings  Patient Activation Measure Survey Score:       No data to display                  Care Plan and Education Provided:  Healthy Eating: Balanced meals, Consistency in amount and timing of carbohydrate intake, Eating out, and Portion control, Being Active: Amount recommended (150 minutes moderate or 75 minutes vigorous activity and 2-3 days strength training per week), Finding a physical activity routine that works for you, and Relationship of activity to glucose, Monitoring: Frequency of monitoring and Individual glucose targets, Taking Medication: Action of prescribed medication(s), Side effects of prescribed medication(s), and When to take medication(s), Reducing Risks: Eye care, Foot care, Goal for A1c, how it relates to glucose and how often to check, Prevention, early diagnostic measures and treatment of complications, and A1C - goals, relating to blood glucose levels, how often to check, and Healthy Coping: Benefits of making appropriate lifestyle changes, Identifying helpful resources, Methods of coping with stress, Recognize feelings  about diagnosis, and Utilize support systems    Thank you,  Kandace Dc RN [facility]  Certified Diabetes Care and   Visit type : DSMT      Time Spent: 30 minutes  Encounter Type: Individual    Any diabetes medication dose changes were made via the CDE Protocol per the patient's referring provider and primary care provider. A copy of this encounter was shared with the provider.   Much or all of the text in this note was generated through the use of the Dragon Dictate voice-to-text software.Errors in spelling or words which seem out of context are unintentional. Sound alike errors, in particular, may have escaped editing.       Patient unable to complete

## 2024-05-30 VITALS — BODY MASS INDEX: 29.18 KG/M2 | WEIGHT: 170 LBS

## 2024-05-30 NOTE — PROGRESS NOTES
Diabetes Self-Management Education & Support    Presents for: Individual review    Type of Service: In Person Visit      ASSESSMENT:  Kathy is a very pleasant 68-year-old who returns to clinic today to review blood glucose, medications recheck A1c, and to assess/assist her with her current diabetes self-management skills with an A1c not meeting ADA goal.  She arrived today unaccompanied and had her glucose meter with her.  Medications were reviewed and Kathy confirms she is currently taking 1000 mg of metformin p.o. twice daily, and 7 mg of Rybelsus p.o. daily, both medications with no missed or skipped doses.  Prior to our visit today we did recheck her A1c and was very pleased to see it returned even lower at 7.5% from a previous 7.7%.  Kathy actually stated she was surprised that it was lower as the last 3 months have been pretty stressful for her regarding some health issues she has been experiencing and she has not been taking care of herself as she knew she should probably be.   I congratulated her on her A1c and let her know that despite the added stressors she still was obviously doing something to keep her on the right track.  Her  has opened up their pool and she is very excited to get back to swimming during the summer and going to her cabin on the weekends.  She will be following up with her primary care provider in June and I will meet with her again in clinic in September.  I instructed her to call with any questions or concerns that come up before that time.  Patient's most recent   Lab Results   Component Value Date    A1C 7.5 05/29/2024     is meeting goal of  7-7.5%    Diabetes knowledge and skills assessment:   Patient is knowledgeable in diabetes management concepts related to: Healthy Eating, Being Active, Monitoring, Taking Medication, and Reducing Risks    Continue education with the following diabetes management concepts: Healthy Eating, Being Active, Reducing Risks, and Healthy  "Coping    Based on learning assessment above, most appropriate setting for further diabetes education would be: Individual setting.      PLAN    See Patient Instructions for co-developed, patient-stated behavior change goals.  AVS printed and provided to patient today. See Follow-Up section for recommended follow-up.       Topics to cover at upcoming visits: Healthy Eating, Being Active, Reducing Risks, and Healthy Coping    Follow-up: 9/4/24    See Care Plan for co-developed, patient-state behavior change goals.  AVS provided for patient today.    Education Materials Provided:  No new materials provided today      SUBJECTIVE/OBJECTIVE:  Presents for: Individual review  Accompanied by: Self  Diabetes education in the past 24 mo: Yes ([facility] 9/27/23)  Focus of Visit: Monitoring, Assistance w/ making life changes, Self-care behavioral goal setting, Taking Medication, Reducing Risks  Diabetes type: Type 2  Date of diagnosis: 2017  Disease course: Improving (A1c today 7.7% from previous 8.2%)  How confident are you filling out medical forms by yourself:: Extremely  Difficulty affording diabetes medication?: Sometimes (currently in [employer])  Difficulty affording diabetes testing supplies?: No  Other concerns:: None  Cultural Influences/Ethnic Background:  Not  or       Diabetes Symptoms & Complications:  Diabetes Related Symptoms: None  Weight trend: Stable  Symptom course: Stable  Disease course: Improving (A1c today 7.7% from previous 8.2%)  Complications assessed today?: No    Patient Problem List and Family Medical History reviewed for relevant medical history, current medical status, and diabetes risk factors.    Vitals:  Wt 77.1 kg (170 lb)   LMP  (LMP Unknown)   BMI 29.18 kg/m    Estimated body mass index is 29.18 kg/m  as calculated from the following:    Height as of 3/12/24: 1.626 m (5' 4\").    Weight as of this encounter: 77.1 kg (170 lb).   Last 3 BP:   BP Readings from Last 3 Encounters: " "  03/12/24 (!) 150/93   02/26/24 133/74   11/07/23 (!) 140/82       History   Smoking Status    Never   Smokeless Tobacco    Never       Labs:  Lab Results   Component Value Date    A1C 7.5 05/29/2024     Lab Results   Component Value Date     02/26/2024     03/22/2022     Lab Results   Component Value Date    LDL 95 02/26/2024     Direct Measure HDL   Date Value Ref Range Status   02/26/2024 54 >=50 mg/dL Final   ]  GFR Estimate   Date Value Ref Range Status   02/26/2024 71 >60 mL/min/1.73m2 Final   08/27/2020 >60 >60 mL/min/1.73m2 Final     GFR Estimate If Black   Date Value Ref Range Status   08/27/2020 >60 >60 mL/min/1.73m2 Final     Lab Results   Component Value Date    CR 0.89 02/26/2024     No results found for: \"MICROALBUMIN\"    Healthy Eating:  Healthy Eating Assessed Today: Yes  Cultural/Mormon diet restrictions?: No  Do you have any food allergies or intolerances?: No  Meal planning/habits: Avoiding sweets, Low salt, Smaller portions  Who cooks/prepares meals for you?: Self  Who purchases food in  your home?: Self  How many times a week on average do you eat food made away from home (restaurant/take-out)?: 3  Meals include: Lunch, Dinner, Evening Snack  Breakfast: Kashi cereal or Oatmeal or PB toast - WE might have eggs/matthews  Lunch: cup of soup or 1/2 sand or leftovers - sometimes just cheese and crackers  Dinner: + protein + vegetable + starch  Snacks: evenings - popcorn, veggies and dip, cheese and crackers , once in awhile 1 small scoop of ice cream  day- apples  Other: getting better with portions and food choices - saying no -- decreased appetite from Rybelsus  Beverages: Water, Tea (milk with cereal, SF IT)  Has patient met with a dietitian in the past?: No    Being Active:  Being Active Assessed Today: Yes  Exercise:: Yes  Days per week of moderate to strenuous exercise (like a brisk walk): 2 (walking in mall once a week - has pool at home and will swim regularly in months weather " permits)  On average, minutes per day of exercise at this level: 30  How intense was your typical exercise? : Light (like stretching or slow walking) (walking in mall 2x/week - 30 min-   has opened up their pool last week and she is very excited about this)  Exercise Minutes per Week: 60  Barrier to exercise: None    Monitoring:  Monitoring Assessed Today: Yes  Did patient bring glucose meter to appointment? : Yes (and logbook)  Blood Glucose Meter: ContourNext  Times checking blood sugar at home (number): 3  Times checking blood sugar at home (per): Week (am fasting)  Blood glucose trend: Decreasing    The following are Kathy's most recent blood glucose readings that were taken from her meter today.    FB, 135, 144, 136  2-hour post dinner: 130, 171, 153, 172, 158, 137    These readings were reviewed and discussed with Kathy during her visit today.  Blood glucose and glycemic control continues to improve despite the stressors she has faced and this was indeed reflected on her A1c we did today.    Taking Medications:  Diabetes Medication(s)       Biguanides       metFORMIN (GLUCOPHAGE) 500 MG tablet Take 2 tablets (1,000 mg) by mouth 2 times daily (with meals)       Incretin Mimetic Agents       Semaglutide (RYBELSUS) 7 MG tablet Take 1 tablet (7 mg) by mouth daily            Taking Medication Assessed Today: Yes  Current Treatments: Oral Medication (taken by mouth)  Problems taking diabetes medications regularly?: No (cost is a barrier- Rybelsus is expensive)  Diabetes medication side effects?: No    Problem Solving:  Problem Solving Assessed Today: Yes  Is the patient at risk for hypoglycemia?: No  Is the patient at risk for DKA?: No  Does patient have severe weather/disaster plan for diabetes management?: Not Needed  Does patient have sick day plan for diabetes management?: Not Needed    Hypoglycemia Symptoms  Hypoglycemia: None    Hypoglycemia Complications  Hypoglycemia Complications:  None    Reducing Risks:  Reducing Risks Assessed Today: Yes  Diabetes Risks: Age over 45 years, Sedentary Lifestyle, Hyperlipidemia  CAD Risks: Diabetes Mellitus, Obesity, Stress, Post-menopausal, Sedentary lifestyle, Dyslipidemia  Has dilated eye exam at least once a year?: Yes  Sees dentist every 6 months?: Yes  Feet checked by healthcare provider in the last year?: Yes    Healthy Coping:  Healthy Coping Assessed Today: Yes  Emotional response to diabetes: Guilt/Self-blame, Concern for health and well-being  Informal Support system:: Spouse  Stage of change: ACTION (Actively working towards change)  Support resources: In-person Offerings  Patient Activation Measure Survey Score:       No data to display                  Care Plan and Education Provided:  Healthy Eating: Balanced meals, Consistency in amount and timing of carbohydrate intake, Eating out, and Portion control, Being Active: Amount recommended (150 minutes moderate or 75 minutes vigorous activity and 2-3 days strength training per week), Finding a physical activity routine that works for you, and Relationship of activity to glucose, Monitoring: Frequency of monitoring and Individual glucose targets, Taking Medication: Action of prescribed medication(s), Side effects of prescribed medication(s), and When to take medication(s), Reducing Risks: Eye care, Foot care, Goal for A1c, how it relates to glucose and how often to check, Prevention, early diagnostic measures and treatment of complications, and A1C - goals, relating to blood glucose levels, how often to check, and Healthy Coping: Benefits of making appropriate lifestyle changes, Identifying helpful resources, Methods of coping with stress, Recognize feelings about diagnosis, and Utilize support systems    Thank you,  Kandace Dc RN [facility]  Certified Diabetes Care and   Visit type : DSMT      Time Spent: 30 minutes  Encounter Type: Individual    Any diabetes medication dose  changes were made via the CDE Protocol per the patient's referring provider and primary care provider. A copy of this encounter was shared with the provider.   Much or all of the text in this note was generated through the use of the Dragon Dictate voice-to-text software.Errors in spelling or words which seem out of context are unintentional. Sound alike errors, in particular, may have escaped editing.

## 2024-06-03 ENCOUNTER — OFFICE VISIT (OUTPATIENT)
Dept: FAMILY MEDICINE | Facility: CLINIC | Age: 69
End: 2024-06-03
Payer: COMMERCIAL

## 2024-06-03 VITALS
BODY MASS INDEX: 29.3 KG/M2 | HEART RATE: 84 BPM | WEIGHT: 171.6 LBS | RESPIRATION RATE: 16 BRPM | SYSTOLIC BLOOD PRESSURE: 112 MMHG | OXYGEN SATURATION: 99 % | DIASTOLIC BLOOD PRESSURE: 69 MMHG | HEIGHT: 64 IN

## 2024-06-03 DIAGNOSIS — E11.9 TYPE 2 DIABETES MELLITUS WITHOUT COMPLICATION, WITHOUT LONG-TERM CURRENT USE OF INSULIN (H): Primary | ICD-10-CM

## 2024-06-03 DIAGNOSIS — Z12.31 VISIT FOR SCREENING MAMMOGRAM: ICD-10-CM

## 2024-06-03 DIAGNOSIS — R03.0 WHITE COAT SYNDROME WITHOUT HYPERTENSION: ICD-10-CM

## 2024-06-03 DIAGNOSIS — E78.5 HYPERLIPIDEMIA, UNSPECIFIED HYPERLIPIDEMIA TYPE: ICD-10-CM

## 2024-06-03 PROCEDURE — 99214 OFFICE O/P EST MOD 30 MIN: CPT | Performed by: FAMILY MEDICINE

## 2024-06-03 PROCEDURE — G2211 COMPLEX E/M VISIT ADD ON: HCPCS | Performed by: FAMILY MEDICINE

## 2024-06-03 NOTE — PROGRESS NOTES
"  Assessment & Plan     ICD-10-CM    1. Type 2 diabetes mellitus without complication, without long-term current use of insulin (H)  E11.9 Adult Eye  Referral      2. Visit for screening mammogram  Z12.31 MA SCREENING DIGITAL BILAT - Future  (s+30)      3. Hyperlipidemia, unspecified hyperlipidemia type  E78.5       4. White coat syndrome without hypertension  R03.0         Patient presents today for follow-up.  1: Type 2 diabetes: A1c recently checked his 7.5.  She believes during summer it should improve further because of activities like swimming and going up to the cabin.  Encouraged her to continue healthy lifestyle.  Referral to follow-up eye exam as last 1 was done June 14.  She is agreeable  2: Hyperlipidemia: LDL remains suboptimal at 95.  Discussed the goal for diabetes to be less than 70 mg/dL.  She is on atorvastatin 40 mg.  Will recheck at next annual wellness and increase to 80 mg if needed.  3: Whitecoat syndrome: Her blood pressure is very high on initial exams.  However after sitting down on repeat check it is usually normotensive.  She is not a good candidate for anyone in future starting on medication as likely will have hypotension outside of the clinic.  Discussed this with the patient.  She agrees that if she is just very tense.      BMI  Estimated body mass index is 29.46 kg/m  as calculated from the following:    Height as of this encounter: 1.626 m (5' 4\").    Weight as of this encounter: 77.8 kg (171 lb 9.6 oz).         MEDICATIONS:  Continue current medications without change  Regular exercise    The longitudinal plan of care for the diagnosis(es)/condition(s) as documented were addressed during this visit. Due to the added complexity in care, I will continue to support Kathy in the subsequent management and with ongoing continuity of care.    Subjective   Kathy is a 68 year old, presenting for the following health issues:  Diabetes (Diabetic check, A1C was already drawn 5 days " "ago. No concerns)        6/3/2024     9:09 AM   Additional Questions   Roomed by Yelena Arreola CMA     History of Present Illness       Diabetes:   She presents for follow up of diabetes.  She is checking home blood glucose one time daily.   She checks blood glucose before and after meals.  Blood glucose is never over 200 and never under 70.  When her blood glucose is low, the patient is asymptomatic for confusion, blurred vision, lethargy and reports not feeling dizzy, shaky, or weak.   She has no concerns regarding her diabetes at this time.   She is not experiencing numbness or burning in feet, excessive thirst, blurry vision, weight changes or redness, sores or blisters on feet. The patient has had a diabetic eye exam in the last 12 months. Eye exam performed on july 2023. Location of last eye exam [facility].        She eats 4 or more servings of fruits and vegetables daily.She consumes 0 sweetened beverage(s) daily.She exercises with enough effort to increase her heart rate 10 to 19 minutes per day.  She exercises with enough effort to increase her heart rate 4 days per week.   She is taking medications regularly.         Review of Systems  Constitutional, neuro, ENT, endocrine, pulmonary, cardiac, gastrointestinal, genitourinary, musculoskeletal, integument and psychiatric systems are negative, except as otherwise noted.      Objective    /69   Pulse 84   Resp 16   Ht 1.626 m (5' 4\")   Wt 77.8 kg (171 lb 9.6 oz)   LMP  (LMP Unknown)   SpO2 99%   BMI 29.46 kg/m    Body mass index is 29.46 kg/m .  Physical Exam   GENERAL: alert and no distress    [facility] Health/Nurse Visit on 05/29/2024   Component Date Value Ref Range Status    Hemoglobin A1C 05/29/2024 7.5 (H)  0.0 - 5.6 % Final    Normal <5.7%   Prediabetes 5.7-6.4%    Diabetes 6.5% or higher     Note: Adopted from ADA consensus guidelines.           Signed Electronically by: Mana Lynch MD    "

## 2024-06-06 ENCOUNTER — TELEPHONE (OUTPATIENT)
Dept: OTOLARYNGOLOGY | Facility: CLINIC | Age: 69
End: 2024-06-06
Payer: COMMERCIAL

## 2024-06-06 NOTE — TELEPHONE ENCOUNTER
Left Voicemail (1st Attempt) for the patient to call back and schedule the following:    Appointment type: RTN CRani  Provider: Nacho  Return date: next   Specialty phone number: direct   Additional appointment(s) needed: MRI and Audio  Additional Notes: Providers not available on 9/24

## 2024-06-11 ENCOUNTER — TRANSFERRED RECORDS (OUTPATIENT)
Dept: HEALTH INFORMATION MANAGEMENT | Facility: CLINIC | Age: 69
End: 2024-06-11
Payer: COMMERCIAL

## 2024-07-20 ENCOUNTER — HEALTH MAINTENANCE LETTER (OUTPATIENT)
Age: 69
End: 2024-07-20

## 2024-08-04 ENCOUNTER — PATIENT OUTREACH (OUTPATIENT)
Dept: CARE COORDINATION | Facility: CLINIC | Age: 69
End: 2024-08-04
Payer: COMMERCIAL

## 2024-08-06 DIAGNOSIS — E78.5 HYPERLIPIDEMIA, UNSPECIFIED HYPERLIPIDEMIA TYPE: ICD-10-CM

## 2024-08-07 RX ORDER — ATORVASTATIN CALCIUM 40 MG/1
40 TABLET, FILM COATED ORAL AT BEDTIME
Qty: 90 TABLET | Refills: 2 | Status: SHIPPED | OUTPATIENT
Start: 2024-08-07

## 2024-08-19 ENCOUNTER — MYC REFILL (OUTPATIENT)
Dept: FAMILY MEDICINE | Facility: CLINIC | Age: 69
End: 2024-08-19
Payer: COMMERCIAL

## 2024-08-19 DIAGNOSIS — E11.9 TYPE 2 DIABETES MELLITUS WITHOUT COMPLICATION, WITHOUT LONG-TERM CURRENT USE OF INSULIN (H): ICD-10-CM

## 2024-08-20 ENCOUNTER — TELEPHONE (OUTPATIENT)
Dept: OTOLARYNGOLOGY | Facility: CLINIC | Age: 69
End: 2024-08-20
Payer: COMMERCIAL

## 2024-08-21 ENCOUNTER — TELEPHONE (OUTPATIENT)
Dept: OTOLARYNGOLOGY | Facility: CLINIC | Age: 69
End: 2024-08-21

## 2024-08-21 NOTE — TELEPHONE ENCOUNTER
Patient confirmed scheduled appointment:  Date: 3/24/25  Time: 2  Provider: Nacho  Location: [facility]   Testing/imaging: WIN and MRI prior   Additional notes:

## 2024-09-08 ASSESSMENT — SOCIAL DETERMINANTS OF HEALTH (SDOH): HOW OFTEN DO YOU GET TOGETHER WITH FRIENDS OR RELATIVES?: ONCE A WEEK

## 2024-09-11 DIAGNOSIS — E11.9 TYPE 2 DIABETES MELLITUS WITHOUT COMPLICATION, WITHOUT LONG-TERM CURRENT USE OF INSULIN (H): Primary | ICD-10-CM

## 2024-09-12 ENCOUNTER — ALLIED HEALTH/NURSE VISIT (OUTPATIENT)
Dept: EDUCATION SERVICES | Facility: CLINIC | Age: 69
End: 2024-09-12
Payer: COMMERCIAL

## 2024-09-12 ENCOUNTER — OFFICE VISIT (OUTPATIENT)
Dept: FAMILY MEDICINE | Facility: CLINIC | Age: 69
End: 2024-09-12
Payer: COMMERCIAL

## 2024-09-12 VITALS
OXYGEN SATURATION: 100 % | SYSTOLIC BLOOD PRESSURE: 132 MMHG | BODY MASS INDEX: 28.17 KG/M2 | RESPIRATION RATE: 16 BRPM | WEIGHT: 165 LBS | HEART RATE: 85 BPM | HEIGHT: 64 IN | DIASTOLIC BLOOD PRESSURE: 77 MMHG

## 2024-09-12 VITALS — BODY MASS INDEX: 28.36 KG/M2 | WEIGHT: 165.2 LBS

## 2024-09-12 DIAGNOSIS — E11.9 TYPE 2 DIABETES MELLITUS WITHOUT COMPLICATION, WITHOUT LONG-TERM CURRENT USE OF INSULIN (H): ICD-10-CM

## 2024-09-12 DIAGNOSIS — N90.4 LICHEN SCLEROSUS OF FEMALE GENITALIA: ICD-10-CM

## 2024-09-12 DIAGNOSIS — R03.0 WHITE COAT SYNDROME WITHOUT HYPERTENSION: ICD-10-CM

## 2024-09-12 DIAGNOSIS — R20.2 PARESTHESIA: ICD-10-CM

## 2024-09-12 DIAGNOSIS — Z00.00 ENCOUNTER FOR MEDICARE ANNUAL WELLNESS EXAM: Primary | ICD-10-CM

## 2024-09-12 DIAGNOSIS — E11.65 CONTROLLED TYPE 2 DIABETES MELLITUS WITH HYPERGLYCEMIA, WITHOUT LONG-TERM CURRENT USE OF INSULIN (H): Primary | ICD-10-CM

## 2024-09-12 DIAGNOSIS — E78.5 HYPERLIPIDEMIA, UNSPECIFIED HYPERLIPIDEMIA TYPE: ICD-10-CM

## 2024-09-12 LAB
BASOPHILS # BLD AUTO: 0 10E3/UL (ref 0–0.2)
BASOPHILS NFR BLD AUTO: 1 %
EOSINOPHIL # BLD AUTO: 0.2 10E3/UL (ref 0–0.7)
EOSINOPHIL NFR BLD AUTO: 2 %
ERYTHROCYTE [DISTWIDTH] IN BLOOD BY AUTOMATED COUNT: 12.4 % (ref 10–15)
HBA1C MFR BLD: 7.3 % (ref 0–5.6)
HCT VFR BLD AUTO: 52.9 % (ref 35–47)
HGB BLD-MCNC: 17.3 G/DL (ref 11.7–15.7)
HOLD SPECIMEN: NORMAL
IMM GRANULOCYTES # BLD: 0 10E3/UL
IMM GRANULOCYTES NFR BLD: 0 %
LYMPHOCYTES # BLD AUTO: 2 10E3/UL (ref 0.8–5.3)
LYMPHOCYTES NFR BLD AUTO: 26 %
MCH RBC QN AUTO: 29.3 PG (ref 26.5–33)
MCHC RBC AUTO-ENTMCNC: 32.7 G/DL (ref 31.5–36.5)
MCV RBC AUTO: 90 FL (ref 78–100)
MONOCYTES # BLD AUTO: 0.5 10E3/UL (ref 0–1.3)
MONOCYTES NFR BLD AUTO: 7 %
NEUTROPHILS # BLD AUTO: 5 10E3/UL (ref 1.6–8.3)
NEUTROPHILS NFR BLD AUTO: 65 %
PLATELET # BLD AUTO: 252 10E3/UL (ref 150–450)
RBC # BLD AUTO: 5.9 10E6/UL (ref 3.8–5.2)
WBC # BLD AUTO: 7.7 10E3/UL (ref 4–11)

## 2024-09-12 PROCEDURE — 90662 IIV NO PRSV INCREASED AG IM: CPT | Performed by: FAMILY MEDICINE

## 2024-09-12 PROCEDURE — 82607 VITAMIN B-12: CPT | Performed by: FAMILY MEDICINE

## 2024-09-12 PROCEDURE — G0108 DIAB MANAGE TRN  PER INDIV: HCPCS

## 2024-09-12 PROCEDURE — G0008 ADMIN INFLUENZA VIRUS VAC: HCPCS | Performed by: FAMILY MEDICINE

## 2024-09-12 PROCEDURE — G0439 PPPS, SUBSEQ VISIT: HCPCS | Performed by: FAMILY MEDICINE

## 2024-09-12 PROCEDURE — 84443 ASSAY THYROID STIM HORMONE: CPT | Performed by: FAMILY MEDICINE

## 2024-09-12 PROCEDURE — 85025 COMPLETE CBC W/AUTO DIFF WBC: CPT | Performed by: FAMILY MEDICINE

## 2024-09-12 PROCEDURE — 99214 OFFICE O/P EST MOD 30 MIN: CPT | Mod: 25 | Performed by: FAMILY MEDICINE

## 2024-09-12 PROCEDURE — 83036 HEMOGLOBIN GLYCOSYLATED A1C: CPT | Performed by: FAMILY MEDICINE

## 2024-09-12 PROCEDURE — 36415 COLL VENOUS BLD VENIPUNCTURE: CPT | Performed by: FAMILY MEDICINE

## 2024-09-12 RX ORDER — CLOBETASOL PROPIONATE 0.5 MG/G
OINTMENT TOPICAL
Qty: 60 G | Refills: 1 | Status: SHIPPED | OUTPATIENT
Start: 2024-09-12

## 2024-09-12 NOTE — PROGRESS NOTES
Preventive Care Visit  [facility]  Mana Lynch MD, Family Medicine  Sep 12, 2024      Assessment & Plan     ICD-10-CM    1. Encounter for Medicare annual wellness exam  Z00.00 CBC with platelets and differential     CBC with platelets and differential      2. Type 2 diabetes mellitus without complication, without long-term current use of insulin (H)  E11.9 Hemoglobin A1c     Semaglutide (RYBELSUS) 7 MG tablet      3. Lichen sclerosus of female genitalia  N90.4 clobetasol (TEMOVATE) 0.05 % external ointment      4. Hyperlipidemia, unspecified hyperlipidemia type  E78.5       5. White coat syndrome without hypertension  R03.0       6. Paresthesia  R20.2 Vitamin B12     TSH with free T4 reflex     TSH with free T4 reflex     Vitamin B12        Patient presented today for Medicare annual wellness exam.  Following issues addressed  1: Type 2 diabetes: Under good control.  Saw diabetes educator today.  A1c is reviewed.  This is at goal.  She was offered increase in the medication but she feels comfortable at her current dose and this is reasonable  2: Lichen sclerosus: Informs me that she uses clobetasol 2 times a week and has not had a flareup for more than 1 year now.  She does not see any sores or spots or any symptoms that are bothersome.  Continue monitoring.  3: Hyperlipidemia: Continue statin.  4: Whitecoat syndrome without hypertension.  Initial blood pressure is always high but subsequent blood pressure after asking patient to relax is normal.  We should be careful about initiating any blood pressure medication in future with just 1 or 2 readings.  This was shared with the patient  5: Paresthesia: Feels that her fingertips are sometimes numb.  Does admit to chewing fingernails.  Added lab testing as above.  Discussed to grow her fingernails and if symptoms are still persistent, we can do further workup.      Counseling  Appropriate preventive services were addressed with this  patient via screening, questionnaire, or discussion as appropriate for fall prevention, nutrition, physical activity, Tobacco-use cessation, social engagement, weight loss and cognition.  Checklist reviewing preventive services available has been given to the patient.  Reviewed patient's diet, addressing concerns and/or questions.   She is at risk for lack of exercise and has been provided with information to increase physical activity for the benefit of her well-being.   She is at risk for psychosocial distress and has been provided with information to reduce risk.       MEDICATIONS:  Continue current medications without change  Work on weight loss  Regular exercise  See Patient Instructions    Bolivar Chavez is a 69 year old, presenting for the following:  Wellness Visit (Pt is fasting today, already had A1C drawn this AM. Pt has had tingling in her fingertips. )        9/12/2024    10:55 AM   Additional Questions   Roomed by Yelena Arreola CMA         Health Care Directive  Patient does not have a Health Care Directive or Living Will: Patient states has Advance Directive and will bring in a copy to clinic.    HPI              9/8/2024   General Health   How would you rate your overall physical health? Good   Feel stress (tense, anxious, or unable to sleep) Only a little      (!) STRESS CONCERN      9/8/2024   Nutrition   Diet: Low fat/cholesterol    Diabetic       Multiple values from one day are sorted in reverse-chronological order         9/8/2024   Exercise   Days per week of moderate/strenous exercise 2 days   Average minutes spent exercising at this level 40 min      (!) EXERCISE CONCERN      9/8/2024   Social Factors   Frequency of gathering with friends or relatives Once a week   Worry food won't last until get money to buy more No   Food not last or not have enough money for food? No   Do you have housing? (Housing is defined as stable permanent housing and does not include staying ouside in a car, in  a tent, in an abandoned building, in an overnight shelter, or couch-surfing.) Yes   Are you worried about losing your housing? No   Lack of transportation? No   Unable to get utilities (heat,electricity)? Yes   Want help with housing or utility concern? No      (!) FINANCIAL RESOURCE STRAIN CONCERN      9/8/2024   Fall Risk   Fallen 2 or more times in the past year? No   Trouble with walking or balance? No             9/8/2024   Activities of Daily Living- Home Safety   Needs help with the following daily activites None of the above   Safety concerns in the home None of the above            9/8/2024   Dental   Dentist two times every year? Yes            9/8/2024   Hearing Screening   Hearing concerns? None of the above            9/8/2024   Driving Risk Screening   Patient/family members have concerns about driving No            9/8/2024   General Alertness/Fatigue Screening   Have you been more tired than usual lately? No            9/8/2024   Urinary Incontinence Screening   Bothered by leaking urine in past 6 months No            9/8/2024   TB Screening   Were you born outside of the [place]? No            Today's PHQ-2 Score:       9/12/2024    10:40 AM   PHQ-2 ( 1999 [employer])   Q1: Little interest or pleasure in doing things 0   Q2: Feeling down, depressed or hopeless 0   PHQ-2 Score 0   Q1: Little interest or pleasure in doing things Not at all   Q2: Feeling down, depressed or hopeless Not at all   PHQ-2 Score 0           9/8/2024   Substance Use   Alcohol more than 3/day or more than 7/wk No   Do you have a current opioid prescription? No   How severe/bad is pain from 1 to 10? 0/10 (No Pain)   Do you use any other substances recreationally? No        Social History     Tobacco Use     Smoking status: Never     Smokeless tobacco: Never   Substance Use Topics     Alcohol use: Never     Drug use: Never           4/26/2022   LAST FHS-7 RESULTS   1st degree relative breast or ovarian cancer No   Any relative bilateral  breast cancer No   Any male have breast cancer No   Any ONE woman have BOTH breast AND ovarian cancer No   Any woman with breast cancer before 50yrs No   2 or more relatives with breast AND/OR ovarian cancer No   2 or more relatives with breast AND/OR bowel cancer No           Mammogram Screening - Mammogram every 1-2 years updated in Health Maintenance based on mutual decision making      History of abnormal Pap smear: No - age 65 or older with adequate negative prior screening test results (3 consecutive negative cytology results, 2 consecutive negative cotesting results, or 2 consecutive negative HrHPV test results within 10 years, with the most recent test occurring within the recommended screening interval for the test used)        6/13/2016    11:43 AM   PAP / HPV   PAP Negative for squamous intraepithelial lesion or malignancy  Electronically signed by Vera More CT (ASCP) on 6/20/2016 at  2:45 PM        ASCVD Risk   The 10-year ASCVD risk score (Ajith ARCE, et al., 2019) is: 14.5%    Values used to calculate the score:      Age: 69 years      Sex: Female      Is Non- : No      Diabetic: Yes      Tobacco smoker: No      Systolic Blood Pressure: 132 mmHg      Is BP treated: No      HDL Cholesterol: 54 mg/dL      Total Cholesterol: 175 mg/dL            Reviewed and updated as needed this visit by Provider   Tobacco  Allergies  Meds  Problems  Med Hx  Surg Hx  Fam Hx            Past Medical History:   Diagnosis Date     Lichen sclerosus      Past Surgical History:   Procedure Laterality Date     APPENDECTOMY       CHOLECYSTECTOMY       BP Readings from Last 3 Encounters:   09/12/24 132/77   06/03/24 112/69   03/12/24 (!) 150/93    Wt Readings from Last 3 Encounters:   09/12/24 74.8 kg (165 lb)   09/12/24 74.9 kg (165 lb 3.2 oz)   06/03/24 77.8 kg (171 lb 9.6 oz)                  Patient Active Problem List   Diagnosis     Hyperlipidemia     Diabetes type 2,  controlled (H)     Lichen sclerosus of female genitalia     Controlled type 2 diabetes mellitus with hyperglycemia, without long-term current use of insulin (H)     Type 2 diabetes mellitus without complication, without long-term current use of insulin (H)     White coat syndrome without hypertension     Past Surgical History:   Procedure Laterality Date     APPENDECTOMY       CHOLECYSTECTOMY         Social History     Tobacco Use     Smoking status: Never     Smokeless tobacco: Never   Substance Use Topics     Alcohol use: Never     Family History   Problem Relation Age of Onset     Coronary Artery Disease Mother 85.00         at age 86     Coronary Artery Disease Father 70.00        Heart attack in 70s,  at age 91     No Known Problems Sister      No Known Problems Daughter      No Known Problems Maternal Grandmother      No Known Problems Maternal Grandfather      No Known Problems Paternal Grandmother      No Known Problems Paternal Grandfather      No Known Problems Maternal Aunt      No Known Problems Paternal Aunt      Hereditary Breast and Ovarian Cancer Syndrome No family hx of      Breast Cancer No family hx of      Cancer No family hx of      Colon Cancer No family hx of      Endometrial Cancer No family hx of      Ovarian Cancer No family hx of          Current Outpatient Medications   Medication Sig Dispense Refill     atorvastatin (LIPITOR) 40 MG tablet Take 1 tablet (40 mg) by mouth at bedtime 90 tablet 2     blood glucose (NO BRAND SPECIFIED) test strip 1 strip by In Vitro route daily Use to test blood sugar 1-2 times daily or as directed. Contour test strip 100 strip 3     clobetasol (TEMOVATE) 0.05 % external ointment [CLOBETASOL (TEMOVATE) 0.05 % OINTMENT] APPLY TO AFFECTED SKIN AS INSTRUCTED Strength: 0.05 % 60 g 1     metFORMIN (GLUCOPHAGE) 500 MG tablet Take 2 tablets (1,000 mg) by mouth 2 times daily (with meals) 360 tablet 1     Semaglutide (RYBELSUS) 7 MG tablet Take 1 tablet (7 mg)  "by mouth daily. 90 tablet 3     Current providers sharing in care for this patient include:  Patient Care Team:  Mana Lynch MD as PCP - General (Family Medicine)  Mana Lynch MD as Assigned PCP  Sandor Mcnally MD as MD (Otolaryngology)  Guillermina Narvaez AuD as Audiologist (Audiology)  Sandor Mcnally MD as Assigned Surgical Provider  Poonam Plaza AuD as Audiologist (Audiology)  Kandace Leon RN as Registered Nurse (Diabetes Education)  Gallo Rothman MD as Assigned Neuroscience Provider    The following health maintenance items are reviewed in Epic and correct as of today:  Health Maintenance   Topic Date Due     MAMMO SCREENING  04/26/2024     INFLUENZA VACCINE (1) 09/01/2024     COVID-19 Vaccine (7 - 2023-24 season) 09/01/2024     DIABETIC FOOT EXAM  11/07/2024     A1C  12/12/2024     BMP  02/26/2025     LIPID  02/26/2025     MICROALBUMIN  02/26/2025     ANNUAL REVIEW OF HM ORDERS  06/03/2025     EYE EXAM  06/11/2025     MEDICARE ANNUAL WELLNESS VISIT  09/12/2025     FALL RISK ASSESSMENT  09/12/2025     COLORECTAL CANCER SCREENING  02/11/2026     ADVANCE CARE PLANNING  02/06/2028     DTAP/TDAP/TD IMMUNIZATION (3 - Td or Tdap) 11/03/2032     DEXA  12/12/2032     HEPATITIS C SCREENING  Completed     PHQ-2 (once per calendar year)  Completed     Pneumococcal Vaccine: 65+ Years  Completed     ZOSTER IMMUNIZATION  Completed     HPV IMMUNIZATION  Aged Out     MENINGITIS IMMUNIZATION  Aged Out     RSV MONOCLONAL ANTIBODY  Aged Out     PAP  Discontinued         Review of Systems  Constitutional, neuro, ENT, endocrine, pulmonary, cardiac, gastrointestinal, genitourinary, musculoskeletal, integument and psychiatric systems are negative, except as otherwise noted.     Objective    Exam  /77   Pulse 85   Resp 16   Ht 1.626 m (5' 4\")   Wt 74.8 kg (165 lb)   LMP  (LMP Unknown)   SpO2 100%   BMI 28.32 kg/m     Estimated body mass index is 28.32 kg/m  as calculated from the " "following:    Height as of this encounter: 1.626 m (5' 4\").    Weight as of this encounter: 74.8 kg (165 lb).    Physical Exam  GENERAL: alert and no distress        9/12/2024   Mini Cog   Clock Draw Score 2 Normal   3 Item Recall 3 objects recalled   Mini Cog Total Score 5                 Signed Electronically by: Mana Lynch MD    "

## 2024-09-12 NOTE — PATIENT INSTRUCTIONS
1. Eat 3 balanced meals each day - Monitor carb intake and limit to 45-60 grams per meal  This would be equal to 3-4 choices ~  1 choice = 15 grams    Do not wait longer than 4-5 hours to eat something  Snacks limit to no more than 30 grams of carbohydrates or 2 choices  Make sure you include protein source with each meal and at bedtime - this has been shown to help with blood glucose elevations    2.  Check blood sugars once each day - please try and alternate the times you check between am fasting( right after you wake before eating) and 2 hours AFTER dinner - this allows us to get a better idea of what is happening throughout your day , not at just one time     Fasting and before meal target is 80 - 130   2 hours after a meal target is < 180  remember to bring meter and log book to all appointments    3. Incorporate 30 minutes activity into each day - does not need to be all at one time & walking counts    4. Take diabetes medications as prescribed Continue Metformin 1000 mg twice daily      A1C was lower at 7.3%  WAY TO GO SISTER !!!!!  KEEP IT UP - YOU ARE DOING GREAT !!!    Thank you for coming in to see me today !      I value your experience and would be very thankful for your time in providing feedback in our clinic survey.    You may receive an e-mail, text message or even something in the mail from [employer].  This is a survey to let us know how we are doing - the survey will be related to your diabetes education and me.

## 2024-09-12 NOTE — PATIENT INSTRUCTIONS
Patient Education   Preventive Care Advice   This is general advice given by our system to help you stay healthy. However, your care team may have specific advice just for you. Please talk to your care team about your preventive care needs.  Nutrition  Eat 5 or more servings of fruits and vegetables each day.  Try wheat bread, brown rice and whole grain pasta (instead of white bread, rice, and pasta).  Get enough calcium and vitamin D. Check the label on foods and aim for 100% of the RDA (recommended daily allowance).  Lifestyle  Exercise at least 150 minutes each week  (30 minutes a day, 5 days a week).  Do muscle strengthening activities 2 days a week. These help control your weight and prevent disease.  No smoking.  Wear sunscreen to prevent skin cancer.  Have a dental exam and cleaning every 6 months.  Yearly exams  See your health care team every year to talk about:  Any changes in your health.  Any medicines your care team has prescribed.  Preventive care, family planning, and ways to prevent chronic diseases.  Shots (vaccines)   HPV shots (up to age 26), if you've never had them before.  Hepatitis B shots (up to age 59), if you've never had them before.  COVID-19 shot: Get this shot when it's due.  Flu shot: Get a flu shot every year.  Tetanus shot: Get a tetanus shot every 10 years.  Pneumococcal, hepatitis A, and RSV shots: Ask your care team if you need these based on your risk.  Shingles shot (for age 50 and up)  General health tests  Diabetes screening:  Starting at age 35, Get screened for diabetes at least every 3 years.  If you are younger than age 35, ask your care team if you should be screened for diabetes.  Cholesterol test: At age 39, start having a cholesterol test every 5 years, or more often if advised.  Bone density scan (DEXA): At age 50, ask your care team if you should have this scan for osteoporosis (brittle bones).  Hepatitis C: Get tested at least once in your life.  STIs (sexually  transmitted infections)  Before age 24: Ask your care team if you should be screened for STIs.  After age 24: Get screened for STIs if you're at risk. You are at risk for STIs (including HIV) if:  You are sexually active with more than one person.  You don't use condoms every time.  You or a partner was diagnosed with a sexually transmitted infection.  If you are at risk for HIV, ask about PrEP medicine to prevent HIV.  Get tested for HIV at least once in your life, whether you are at risk for HIV or not.  Cancer screening tests  Cervical cancer screening: If you have a cervix, begin getting regular cervical cancer screening tests starting at age 21.  Breast cancer scan (mammogram): If you've ever had breasts, begin having regular mammograms starting at age 40. This is a scan to check for breast cancer.  Colon cancer screening: It is important to start screening for colon cancer at age 45.  Have a colonoscopy test every 10 years (or more often if you're at risk) Or, ask your provider about stool tests like a FIT test every year or Cologuard test every 3 years.  To learn more about your testing options, visit:   .  For help making a decision, visit:   https://bit.ly/sm92887.  Prostate cancer screening test: If you have a prostate, ask your care team if a prostate cancer screening test (PSA) at age 55 is right for you.  Lung cancer screening: If you are a current or former smoker ages 50 to 80, ask your care team if ongoing lung cancer screenings are right for you.  For informational purposes only. Not to replace the advice of your health care provider. Copyright   2023 [employer]. All rights reserved. Clinically reviewed by the [facility] Transitions Program. [employer] [id number] - REV 01/24.

## 2024-09-12 NOTE — LETTER
9/12/2024         RE: Kathy Posada  [address] [place] [address]        Dear Colleague,    Thank you for referring your patient, Kathy Posada, to the [facility]. Please see a copy of my visit note below.    Diabetes Self-Management Education & Support    Presents for: Individual review    Type of Service: In Person Visit      ASSESSMENT:  Kathy is a very pleasant 69-year-old who returns to clinic today to review blood glucose, medications, recheck A1c, and to assess/assist her with her current diabetes self-management skills.  She arrived today unaccompanied and unfortunately forgot her logbook at home.  She did state however that she has now started alternating checking her blood glucose between a.m. fasting and 2 hours after dinner.  Medications reviewed and she confirms she is currently taking 1000 mg of metformin p.o. twice daily, and 7 mg of Rybelsus p.o. daily.  There have been no missed or skipped doses of either medication.  Prior to her visit today we did recheck her A1c and was pleased to see it returned lower from her previous 7.5% to 7.3%.  I informed her although it was not a huge change, there was a change in the right direction nonetheless and I reassured her that she is doing well and things are moving in the right direction.  She continues to work on her diet and activity level.  She did [place] today [place] and this sometimes may make eating and food choices challenging.  I reminded her that all we expect this for her to work on things and need to work on her management skills.  She had no questions or concerns today, so we agreed to meet again in clinic in 3 months when her A1c is next due to be rechecked.  I did instruct her to call with any questions or concerns that come up before that time.    Patient's most recent   Lab Results   Component Value  Date    A1C 7.3 09/12/2024     is meeting goal of  7-7.5%    Diabetes knowledge and skills assessment:   Patient is knowledgeable in diabetes management concepts related to: Healthy Eating, Being Active, Monitoring, Taking Medication, and Reducing Risks    Continue education with the following diabetes management concepts: Healthy Eating, Being Active, Reducing Risks, and Healthy Coping    Based on learning assessment above, most appropriate setting for further diabetes education would be: Individual setting.      PLAN    See Patient Instructions for co-developed, patient-stated behavior change goals.  AVS printed and provided to patient today. See Follow-Up section for recommended follow-up.       Topics to cover at upcoming visits: Healthy Eating, Being Active, Reducing Risks, and Healthy Coping    Follow-up: 1/15/25    See Care Plan for co-developed, patient-state behavior change goals.  AVS provided for patient today.    Education Materials Provided:  No new materials provided today      SUBJECTIVE/OBJECTIVE:  Presents for: Individual review  Accompanied by: Self  Diabetes education in the past 24 mo: Yes (LV 5/29/24)  Focus of Visit: Monitoring, Assistance w/ making life changes, Self-care behavioral goal setting, Healthy Eating, Being Active, Healthy Coping  Diabetes type: Type 2  Date of diagnosis: 2017  Disease course: Stable   How confident are you filling out medical forms by yourself:: Extremely  Transportation concerns: No  Difficulty affording diabetes medication?: Sometimes (currently in donut hole)  Difficulty affording diabetes testing supplies?: No  Other concerns:: None  Cultural Influences/Ethnic Background:  Not  or       Diabetes Symptoms & Complications:  Diabetes Related Symptoms: None  Weight trend: Decreasing (down 5# since LV)  Symptom course: Stable  Disease course: Stable    Complications assessed today?: No    Patient Problem List and Family Medical History reviewed for  "relevant medical history, current medical status, and diabetes risk factors.    Vitals:  Wt 74.9 kg (165 lb 3.2 oz)   LMP  (LMP Unknown)   BMI 28.36 kg/m    Estimated body mass index is 28.36 kg/m  as calculated from the following:    Height as of 6/3/24: 1.626 m (5' 4\").    Weight as of this encounter: 74.9 kg (165 lb 3.2 oz).   Last 3 BP:   BP Readings from Last 3 Encounters:   06/03/24 112/69   03/12/24 (!) 150/93   02/26/24 133/74       History   Smoking Status     Never   Smokeless Tobacco     Never       Labs:  Lab Results   Component Value Date    A1C 7.3 09/12/2024     Lab Results   Component Value Date     02/26/2024     03/22/2022     Lab Results   Component Value Date    LDL 95 02/26/2024     Direct Measure HDL   Date Value Ref Range Status   02/26/2024 54 >=50 mg/dL Final   ]  GFR Estimate   Date Value Ref Range Status   02/26/2024 71 >60 mL/min/1.73m2 Final   08/27/2020 >60 >60 mL/min/1.73m2 Final     GFR Estimate If Black   Date Value Ref Range Status   08/27/2020 >60 >60 mL/min/1.73m2 Final     Lab Results   Component Value Date    CR 0.89 02/26/2024     No results found for: \"MICROALBUMIN\"    Healthy Eating:  Healthy Eating Assessed Today: Yes  Cultural/Spiritism diet restrictions?: No  Do you have any food allergies or intolerances?: No  Meal planning/habits: Avoiding sweets, Low salt, Smaller portions  Who cooks/prepares meals for you?: Self, Spouse (spouse grills a lot in summer months)  Who purchases food in  your home?: Self  How many times a week on average do you eat food made away from home (restaurant/take-out)?: 3  Meals include: Lunch, Dinner, Evening Snack  Breakfast: Kashi cereal or Oatmeal or PB toast - WE might have eggs/matthews  Lunch: cup of soup or 1/2 sand or leftovers - sometimes just cheese and crackers  Dinner: + protein + vegetable + starch - grills in summer  Snacks: evenings - popcorn, veggies and dip, cheese and crackers , once in awhile 1 small scoop of ice " cream  day- apples  Other: getting better with portions and food choices - saying no -- decreased appetite from Rybelsus  Beverages: Water, Tea (milk with cereal, SF IT, cup of leslie tea 1 x/wk , occasionally 4 oz OJ w/breakfast)  Has patient met with a dietitian in the past?: No    Being Active:  Being Active Assessed Today: Yes  Exercise:: Yes  Days per week of moderate to strenuous exercise (like a brisk walk): 4 (walking in mall once a week - has pool at home and will swim regularly in months weather permits)  On average, minutes per day of exercise at this level: 30  How intense was your typical exercise? : Light (like stretching or slow walking) (walking in mall 2 x/week - 30 min-   has opened up their pool last week and she is very excited about this)  Exercise Minutes per Week: 120  Barrier to exercise: None    Monitoring:  Monitoring Assessed Today: Yes  Did patient bring glucose meter to appointment? :  (forgot logbook)  Blood Glucose Meter: Contour Next  Times checking blood sugar at home (number): 1  Times checking blood sugar at home (per): Day (am fasting)  Blood glucose trend: Decreasing    Unfortunately Kathy forgot her logbook at home today so all blood glucose information came solely from her recall alone.  Since our last visit she has gotten much better with alternating times checking her blood glucose between a.m. fasting and 2 hours after dinner.  Per her report today her morning fasting numbers are pretty consistently in the 130's and post dinner can range anywhere from 120 - 150.  She did have one reading of  200 but that was food related.    Taking Medications:  Diabetes Medication(s)       Biguanides       metFORMIN (GLUCOPHAGE) 500 MG tablet Take 2 tablets (1,000 mg) by mouth 2 times daily (with meals)       Incretin Mimetic Agents       Semaglutide (RYBELSUS) 7 MG tablet Take 1 tablet (7 mg) by mouth daily            Taking Medication Assessed Today: Yes  Current Treatments: Oral  Medication (taken by mouth)  Problems taking diabetes medications regularly?: No (cost is a barrier- Rybelsus is expensive)  Diabetes medication side effects?: No    Problem Solving:  Problem Solving Assessed Today: Yes  Is the patient at risk for hypoglycemia?: No  Is the patient at risk for DKA?: No  Does patient have severe weather/disaster plan for diabetes management?: Not Needed  Does patient have sick day plan for diabetes management?: Not Needed    Hypoglycemia Symptoms  Hypoglycemia: None    Hypoglycemia Complications  Hypoglycemia Complications: None    Reducing Risks:  Reducing Risks Assessed Today: Yes  Diabetes Risks: Age over 45 years, Sedentary Lifestyle, Hyperlipidemia  CAD Risks: Diabetes Mellitus, Obesity, Stress, Post-menopausal, Sedentary lifestyle, Dyslipidemia  Has dilated eye exam at least once a year?: Yes  Sees dentist every 6 months?: Yes  Feet checked by healthcare provider in the last year?: Yes    Healthy Coping:  Healthy Coping Assessed Today: Yes  Emotional response to diabetes: Concern for health and well-being  Informal Support system:: Spouse  Stage of change: ACTION (Actively working towards change)  Support resources: In-person Offerings  Patient Activation Measure Survey Score:       No data to display                  Care Plan and Education Provided:  Healthy Eating: Balanced meals, Carbohydrate Counting, Consistency in amount and timing of carbohydrate intake, Eating out, and Portion control, Being Active: Amount recommended (150 minutes moderate or 75 minutes vigorous activity and 2-3 days strength training per week), Finding a physical activity routine that works for you, and Relationship of activity to glucose, Monitoring: Frequency of monitoring and Individual glucose targets, Problem Solving: High glucose - causes, signs/symptoms, treatment and prevention, Reducing Risks: Eye care, Goal for A1c, how it relates to glucose and how often to check, Preventing cardiovascular  disease, including blood pressure goals, lipid goals, recommendations for cardioprotective medications, statins, and aspirin, Prevention, early diagnostic measures and treatment of complications, and A1C - goals, relating to blood glucose levels, how often to check, and Healthy Coping: Benefits of making appropriate lifestyle changes, Identifying helpful resources, Recognize feelings about diagnosis, and Utilize support systems    Thank you,  Kandace Dc RN [facility]  Certified Diabetes Care and   Visit type : DSMT      Time Spent: 30 minutes  Encounter Type: Individual    Any diabetes medication dose changes were made via the CDE Protocol per the patient's referring provider and primary care provider. A copy of this encounter was shared with the provider.   Much or all of the text in this note was generated through the use of the Dragon Dictate voice-to-text software.Errors in spelling or words which seem out of context are unintentional. Sound alike errors, in particular, may have escaped editing.

## 2024-09-12 NOTE — PROGRESS NOTES
Diabetes Self-Management Education & Support    Presents for: Individual review    Type of Service: In Person Visit      ASSESSMENT:  Kathy is a very pleasant 69-year-old who returns to clinic today to review blood glucose, medications, recheck A1c, and to assess/assist her with her current diabetes self-management skills.  She arrived today unaccompanied and unfortunately forgot her logbook at home.  She did state however that she has now started alternating checking her blood glucose between a.m. fasting and 2 hours after dinner.  Medications reviewed and she confirms she is currently taking 1000 mg of metformin p.o. twice daily, and 7 mg of Rybelsus p.o. daily.  There have been no missed or skipped doses of either medication.  Prior to her visit today we did recheck her A1c and was pleased to see it returned lower from her previous 7.5% to 7.3%.  I informed her although it was not a huge change, there was a change in the right direction nonetheless and I reassured her that she is doing well and things are moving in the right direction.  She continues to work on her diet and activity level.  She did disclose today that her and her  have a cabin up [place] at which they entertain quite frequently during the summer and this sometimes may make eating and food choices challenging.  I reminded her that all we expect this for her to work on things and need to work on her management skills.  She had no questions or concerns today, so we agreed to meet again in clinic in 3 months when her A1c is next due to be rechecked.  I did instruct her to call with any questions or concerns that come up before that time.    Patient's most recent   Lab Results   Component Value Date    A1C 7.3 09/12/2024     is meeting goal of  7-7.5%    Diabetes knowledge and skills assessment:   Patient is knowledgeable in diabetes management concepts related to: Healthy Eating, Being Active, Monitoring, Taking Medication, and Reducing  Risks    Continue education with the following diabetes management concepts: Healthy Eating, Being Active, Reducing Risks, and Healthy Coping    Based on learning assessment above, most appropriate setting for further diabetes education would be: Individual setting.      PLAN    See Patient Instructions for co-developed, patient-stated behavior change goals.  AVS printed and provided to patient today. See Follow-Up section for recommended follow-up.       Topics to cover at upcoming visits: Healthy Eating, Being Active, Reducing Risks, and Healthy Coping    Follow-up: 1/15/25    See Care Plan for co-developed, patient-state behavior change goals.  AVS provided for patient today.    Education Materials Provided:  No new materials provided today      SUBJECTIVE/OBJECTIVE:  Presents for: Individual review  Accompanied by: Self  Diabetes education in the past 24 mo: Yes (LV 5/29/24)  Focus of Visit: Monitoring, Assistance w/ making life changes, Self-care behavioral goal setting, Healthy Eating, Being Active, Healthy Coping  Diabetes type: Type 2  Date of diagnosis: 2017  Disease course: Stable   How confident are you filling out medical forms by yourself:: Extremely  Transportation concerns: No  Difficulty affording diabetes medication?: Sometimes (currently in donut hole)  Difficulty affording diabetes testing supplies?: No  Other concerns:: None  Cultural Influences/Ethnic Background:  Not  or       Diabetes Symptoms & Complications:  Diabetes Related Symptoms: None  Weight trend: Decreasing (down 5# since LV)  Symptom course: Stable  Disease course: Stable    Complications assessed today?: No    Patient Problem List and Family Medical History reviewed for relevant medical history, current medical status, and diabetes risk factors.    Vitals:  Wt 74.9 kg (165 lb 3.2 oz)   LMP  (LMP Unknown)   BMI 28.36 kg/m    Estimated body mass index is 28.36 kg/m  as calculated from the following:    Height as of  "6/3/24: 1.626 m (5' 4\").    Weight as of this encounter: 74.9 kg (165 lb 3.2 oz).   Last 3 BP:   BP Readings from Last 3 Encounters:   06/03/24 112/69   03/12/24 (!) 150/93   02/26/24 133/74       History   Smoking Status    Never   Smokeless Tobacco    Never       Labs:  Lab Results   Component Value Date    A1C 7.3 09/12/2024     Lab Results   Component Value Date     02/26/2024     03/22/2022     Lab Results   Component Value Date    LDL 95 02/26/2024     Direct Measure HDL   Date Value Ref Range Status   02/26/2024 54 >=50 mg/dL Final   ]  GFR Estimate   Date Value Ref Range Status   02/26/2024 71 >60 mL/min/1.73m2 Final   08/27/2020 >60 >60 mL/min/1.73m2 Final     GFR Estimate If Black   Date Value Ref Range Status   08/27/2020 >60 >60 mL/min/1.73m2 Final     Lab Results   Component Value Date    CR 0.89 02/26/2024     No results found for: \"MICROALBUMIN\"    Healthy Eating:  Healthy Eating Assessed Today: Yes  Cultural/Sikh diet restrictions?: No  Do you have any food allergies or intolerances?: No  Meal planning/habits: Avoiding sweets, Low salt, Smaller portions  Who cooks/prepares meals for you?: Self, Spouse (spouse grills a lot in summer months)  Who purchases food in  your home?: Self  How many times a week on average do you eat food made away from home (restaurant/take-out)?: 3  Meals include: Lunch, Dinner, Evening Snack  Breakfast: Kashi cereal or Oatmeal or PB toast - WE might have eggs/matthews  Lunch: cup of soup or 1/2 sand or leftovers - sometimes just cheese and crackers  Dinner: + protein + vegetable + starch - grills in summer  Snacks: evenings - popcorn, veggies and dip, cheese and crackers , once in awhile 1 small scoop of ice cream  day- apples  Other: getting better with portions and food choices - saying no -- decreased appetite from Rybelsus  Beverages: Water, Tea (milk with cereal, SF IT, cup of leslie tea 1 x/wk , occasionally 4 oz OJ w/breakfast)  Has patient met with a " dietitian in the past?: No    Being Active:  Being Active Assessed Today: Yes  Exercise:: Yes  Days per week of moderate to strenuous exercise (like a brisk walk): 4 (walking in mall once a week - has pool at home and will swim regularly in months weather permits)  On average, minutes per day of exercise at this level: 30  How intense was your typical exercise? : Light (like stretching or slow walking) (walking in mall 2 x/week - 30 min-   has opened up their pool last week and she is very excited about this)  Exercise Minutes per Week: 120  Barrier to exercise: None    Monitoring:  Monitoring Assessed Today: Yes  Did patient bring glucose meter to appointment? :  (forgot logbook)  Blood Glucose Meter: Contour Next  Times checking blood sugar at home (number): 1  Times checking blood sugar at home (per): Day (am fasting)  Blood glucose trend: Decreasing    Unfortunately Kathy forgot her logbook at home today so all blood glucose information came solely from her recall alone.  Since our last visit she has gotten much better with alternating times checking her blood glucose between a.m. fasting and 2 hours after dinner.  Per her report today her morning fasting numbers are pretty consistently in the 130's and post dinner can range anywhere from 120 - 150.  She did have one reading of  200 but that was food related.    Taking Medications:  Diabetes Medication(s)       Biguanides       metFORMIN (GLUCOPHAGE) 500 MG tablet Take 2 tablets (1,000 mg) by mouth 2 times daily (with meals)       Incretin Mimetic Agents       Semaglutide (RYBELSUS) 7 MG tablet Take 1 tablet (7 mg) by mouth daily            Taking Medication Assessed Today: Yes  Current Treatments: Oral Medication (taken by mouth)  Problems taking diabetes medications regularly?: No (cost is a barrier- Rybelsus is expensive)  Diabetes medication side effects?: No    Problem Solving:  Problem Solving Assessed Today: Yes  Is the patient at risk for  hypoglycemia?: No  Is the patient at risk for DKA?: No  Does patient have severe weather/disaster plan for diabetes management?: Not Needed  Does patient have sick day plan for diabetes management?: Not Needed    Hypoglycemia Symptoms  Hypoglycemia: None    Hypoglycemia Complications  Hypoglycemia Complications: None    Reducing Risks:  Reducing Risks Assessed Today: Yes  Diabetes Risks: Age over 45 years, Sedentary Lifestyle, Hyperlipidemia  CAD Risks: Diabetes Mellitus, Obesity, Stress, Post-menopausal, Sedentary lifestyle, Dyslipidemia  Has dilated eye exam at least once a year?: Yes  Sees dentist every 6 months?: Yes  Feet checked by healthcare provider in the last year?: Yes    Healthy Coping:  Healthy Coping Assessed Today: Yes  Emotional response to diabetes: Concern for health and well-being  Informal Support system:: Spouse  Stage of change: ACTION (Actively working towards change)  Support resources: In-person Offerings  Patient Activation Measure Survey Score:       No data to display                  Care Plan and Education Provided:  Healthy Eating: Balanced meals, Carbohydrate Counting, Consistency in amount and timing of carbohydrate intake, Eating out, and Portion control, Being Active: Amount recommended (150 minutes moderate or 75 minutes vigorous activity and 2-3 days strength training per week), Finding a physical activity routine that works for you, and Relationship of activity to glucose, Monitoring: Frequency of monitoring and Individual glucose targets, Problem Solving: High glucose - causes, signs/symptoms, treatment and prevention, Reducing Risks: Eye care, Goal for A1c, how it relates to glucose and how often to check, Preventing cardiovascular disease, including blood pressure goals, lipid goals, recommendations for cardioprotective medications, statins, and aspirin, Prevention, early diagnostic measures and treatment of complications, and A1C - goals, relating to blood glucose levels,  how often to check, and Healthy Coping: Benefits of making appropriate lifestyle changes, Identifying helpful resources, Recognize feelings about diagnosis, and Utilize support systems    Thank you,  Kandace Dc RN [facility]  Certified Diabetes Care and   Visit type : DSMT      Time Spent: 30 minutes  Encounter Type: Individual    Any diabetes medication dose changes were made via the CDE Protocol per the patient's referring provider and primary care provider. A copy of this encounter was shared with the provider.   Much or all of the text in this note was generated through the use of the Dragon Dictate voice-to-text software.Errors in spelling or words which seem out of context are unintentional. Sound alike errors, in particular, may have escaped editing.

## 2024-09-13 DIAGNOSIS — E53.8 VITAMIN B12 DEFICIENCY (NON ANEMIC): Primary | ICD-10-CM

## 2024-09-13 LAB
TSH SERPL DL<=0.005 MIU/L-ACNC: 2.47 UIU/ML (ref 0.3–4.2)
VIT B12 SERPL-MCNC: 181 PG/ML (ref 232–1245)

## 2024-09-18 ENCOUNTER — PATIENT OUTREACH (OUTPATIENT)
Dept: FAMILY MEDICINE | Facility: CLINIC | Age: 69
End: 2024-09-18
Payer: COMMERCIAL

## 2024-09-18 NOTE — TELEPHONE ENCOUNTER
Patient Quality Outreach    Patient is due for the following:   Breast Cancer Screening - Mammogram    Next Steps:   Scheduled Mammo    Type of outreach:    Sent [employer] message.      Questions for provider review:    None           Jennifer Lara MA

## 2024-09-19 DIAGNOSIS — E11.9 TYPE 2 DIABETES MELLITUS WITHOUT COMPLICATION, WITHOUT LONG-TERM CURRENT USE OF INSULIN (H): ICD-10-CM

## 2024-10-23 ENCOUNTER — PATIENT OUTREACH (OUTPATIENT)
Dept: CARE COORDINATION | Facility: CLINIC | Age: 69
End: 2024-10-23
Payer: COMMERCIAL

## 2024-12-10 ENCOUNTER — PATIENT OUTREACH (OUTPATIENT)
Dept: FAMILY MEDICINE | Facility: CLINIC | Age: 69
End: 2024-12-10
Payer: COMMERCIAL

## 2024-12-10 NOTE — TELEPHONE ENCOUNTER
Patient Quality Outreach    Patient is due for the following:   Diabetes -  A1C and Foot Exam  Breast Cancer Screening - Mammogram      Topic Date Due    COVID-19 Vaccine (7 - 2024-25 season) 09/01/2024       Action(s) Taken:   Patient has upcoming appointment, these items will be addressed at that time.    Type of outreach:    Chart review performed, no outreach needed.    Questions for provider review:    None           Jennifer Lara MA

## 2024-12-26 ENCOUNTER — PATIENT OUTREACH (OUTPATIENT)
Dept: CARE COORDINATION | Facility: CLINIC | Age: 69
End: 2024-12-26
Payer: COMMERCIAL

## 2025-01-13 DIAGNOSIS — E11.65 CONTROLLED TYPE 2 DIABETES MELLITUS WITH HYPERGLYCEMIA, WITHOUT LONG-TERM CURRENT USE OF INSULIN (H): Primary | ICD-10-CM

## 2025-01-13 DIAGNOSIS — E11.9 TYPE 2 DIABETES MELLITUS WITHOUT COMPLICATION, WITHOUT LONG-TERM CURRENT USE OF INSULIN (H): Primary | ICD-10-CM

## 2025-01-18 ENCOUNTER — HEALTH MAINTENANCE LETTER (OUTPATIENT)
Age: 70
End: 2025-01-18

## 2025-03-17 ENCOUNTER — OFFICE VISIT (OUTPATIENT)
Dept: FAMILY MEDICINE | Facility: CLINIC | Age: 70
End: 2025-03-17
Payer: COMMERCIAL

## 2025-03-17 VITALS
TEMPERATURE: 97.6 F | HEIGHT: 65 IN | HEART RATE: 84 BPM | BODY MASS INDEX: 28.02 KG/M2 | DIASTOLIC BLOOD PRESSURE: 79 MMHG | RESPIRATION RATE: 12 BRPM | WEIGHT: 168.2 LBS | SYSTOLIC BLOOD PRESSURE: 136 MMHG | OXYGEN SATURATION: 96 %

## 2025-03-17 DIAGNOSIS — E53.8 VITAMIN B12 DEFICIENCY (NON ANEMIC): ICD-10-CM

## 2025-03-17 DIAGNOSIS — E11.9 TYPE 2 DIABETES MELLITUS WITHOUT COMPLICATION, WITHOUT LONG-TERM CURRENT USE OF INSULIN (H): Primary | ICD-10-CM

## 2025-03-17 DIAGNOSIS — E11.9 TYPE 2 DIABETES MELLITUS WITHOUT COMPLICATION, WITHOUT LONG-TERM CURRENT USE OF INSULIN (H): ICD-10-CM

## 2025-03-17 DIAGNOSIS — D32.9 MENINGIOMA (H): ICD-10-CM

## 2025-03-17 DIAGNOSIS — R03.0 WHITE COAT SYNDROME WITHOUT HYPERTENSION: ICD-10-CM

## 2025-03-17 LAB
BASOPHILS # BLD AUTO: 0 10E3/UL (ref 0–0.2)
BASOPHILS NFR BLD AUTO: 0 %
CREAT UR-MCNC: 92.2 MG/DL
EOSINOPHIL # BLD AUTO: 0.2 10E3/UL (ref 0–0.7)
EOSINOPHIL NFR BLD AUTO: 2 %
ERYTHROCYTE [DISTWIDTH] IN BLOOD BY AUTOMATED COUNT: 12.2 % (ref 10–15)
EST. AVERAGE GLUCOSE BLD GHB EST-MCNC: 171 MG/DL
HBA1C MFR BLD: 7.6 % (ref 0–5.6)
HCT VFR BLD AUTO: 44.9 % (ref 35–47)
HGB BLD-MCNC: 14.7 G/DL (ref 11.7–15.7)
IMM GRANULOCYTES # BLD: 0 10E3/UL
IMM GRANULOCYTES NFR BLD: 0 %
LYMPHOCYTES # BLD AUTO: 2.5 10E3/UL (ref 0.8–5.3)
LYMPHOCYTES NFR BLD AUTO: 32 %
MCH RBC QN AUTO: 28.8 PG (ref 26.5–33)
MCHC RBC AUTO-ENTMCNC: 32.7 G/DL (ref 31.5–36.5)
MCV RBC AUTO: 88 FL (ref 78–100)
MICROALBUMIN UR-MCNC: 14.2 MG/L
MICROALBUMIN/CREAT UR: 15.4 MG/G CR (ref 0–25)
MONOCYTES # BLD AUTO: 0.7 10E3/UL (ref 0–1.3)
MONOCYTES NFR BLD AUTO: 9 %
NEUTROPHILS # BLD AUTO: 4.5 10E3/UL (ref 1.6–8.3)
NEUTROPHILS NFR BLD AUTO: 57 %
PLATELET # BLD AUTO: 305 10E3/UL (ref 150–450)
RBC # BLD AUTO: 5.1 10E6/UL (ref 3.8–5.2)
WBC # BLD AUTO: 7.9 10E3/UL (ref 4–11)

## 2025-03-17 RX ORDER — COVID-19 VACCINE, MRNA 0.04 MG/.418ML
INJECTION, SUSPENSION INTRAMUSCULAR
COMMUNITY
Start: 2024-09-16

## 2025-03-17 NOTE — PROGRESS NOTES
Assessment & Plan     ICD-10-CM    1. Type 2 diabetes mellitus without complication, without long-term current use of insulin (H)  E11.9 Lipid panel reflex to direct LDL Non-fasting     Albumin Random Urine Quantitative with Creat Ratio     Lipid panel reflex to direct LDL Non-fasting     Albumin Random Urine Quantitative with Creat Ratio      2. White coat syndrome without hypertension  R03.0       3. Vitamin B12 deficiency (non anemic)  E53.8 CBC with platelets and differential     Vitamin B12      4. Meningioma (H)  D32.9         Patient presents today for follow-up of diabetes.  Following issues addressed  1: Diabetes: In fair control.  Continue current medication.  Follow-up in 3 months.  She is on metformin and Rybelsus which she is tolerating well.  Lab Results   Component Value Date    A1C 7.6 03/17/2025    A1C 7.3 09/12/2024    A1C 7.5 05/29/2024    A1C 7.7 02/21/2024    A1C 8.2 11/07/2023       2: Whitecoat hypertension: Remains in fair control.  She does have extreme nervousness at home blood pressures are normal.  Continue to monitor at this time  3: Vitamin B12 deficiency: Noted at last visit.  Since then she has been taking oral supplement.  Check vitamin B-12 today  4: Meningioma: Best practice advisory mentions this.  Reviewed that patient had meningioma noticed during an MRI done for hearing loss.  This is being monitored by [facility] physician.  Reviewed last follow-up and specialist notes.  It was considered reasonable to obtain follow-up scan in 1 year and continue with weaitful watching.  Patient is scheduled for an MRI and a follow-up with the specialist.  Seen by Dr. Suzanne Griffith with ENT clinic/[facility] and neurosurgeon Dr. Gallo Rothman    The longitudinal plan of care for the diagnosis(es)/condition(s) as documented were addressed during this visit. Due to the added complexity in care, I will continue to support Kathy in the subsequent management and  "with ongoing continuity of care.      BMI  Estimated body mass index is 28.43 kg/m  as calculated from the following:    Height as of this encounter: 1.638 m (5' 4.5\").    Weight as of this encounter: 76.3 kg (168 lb 3.2 oz).         MEDICATIONS:  Continue current medications without change  Regular exercise    Bolivar Chavez is a 69 year old, presenting for the following health issues:  Diabetes        3/17/2025     9:09 AM   Additional Questions   Roomed by BREE Nichole CMA([facility])     History of Present Illness       Diabetes:   She presents for follow up of diabetes.  She is checking home blood glucose one time daily.   She checks blood glucose before meals.  Blood glucose is never over 200 and never under 70.  When her blood glucose is low, the patient is asymptomatic for confusion, blurred vision, lethargy and reports not feeling dizzy, shaky, or weak.   She has no concerns regarding her diabetes at this time.   She is not experiencing numbness or burning in feet, excessive thirst, blurry vision, weight changes or redness, sores or blisters on feet.           She eats 4 or more servings of fruits and vegetables daily.She consumes 0 sweetened beverage(s) daily.She exercises with enough effort to increase her heart rate 9 or less minutes per day.  She exercises with enough effort to increase her heart rate 3 or less days per week. She is missing 1 dose(s) of medications per week.  She is not taking prescribed medications regularly due to side effects and remembering to take.        Patient Active Problem List   Diagnosis    Hyperlipidemia    Diabetes type 2, controlled (H)    Lichen sclerosus of female genitalia    Controlled type 2 diabetes mellitus with hyperglycemia, without long-term current use of insulin (H)    Type 2 diabetes mellitus without complication, without long-term current use of insulin (H)    White coat syndrome without hypertension    Meningioma (H)     Current Outpatient Medications " "  Medication Sig Dispense Refill    atorvastatin (LIPITOR) 40 MG tablet Take 1 tablet (40 mg) by mouth at bedtime 90 tablet 2    blood glucose (NO BRAND SPECIFIED) test strip 1 strip by In Vitro route daily Use to test blood sugar 1-2 times daily or as directed. Contour test strip 100 strip 3    clobetasol (TEMOVATE) 0.05 % external ointment [CLOBETASOL (TEMOVATE) 0.05 % OINTMENT] APPLY TO AFFECTED SKIN AS INSTRUCTED Strength: 0.05 % 60 g 1    COMIRNATY 30 MCG/0.3ML MIRLANDE injection       metFORMIN (GLUCOPHAGE) 500 MG tablet Take 2 tablets (1,000 mg) by mouth 2 times daily (with meals). 360 tablet 1    Semaglutide (RYBELSUS) 7 MG tablet Take 1 tablet (7 mg) by mouth daily. 90 tablet 3     No current facility-administered medications for this visit.         Review of Systems  Constitutional, neuro, ENT, endocrine, pulmonary, cardiac, gastrointestinal, genitourinary, musculoskeletal, integument and psychiatric systems are negative, except as otherwise noted.      Objective    /79   Pulse 84   Temp 97.6  F (36.4  C) (Oral)   Resp 12   Ht 1.638 m (5' 4.5\")   Wt 76.3 kg (168 lb 3.2 oz)   LMP  (LMP Unknown)   SpO2 96%   BMI 28.43 kg/m    Body mass index is 28.43 kg/m .  Physical Exam   GENERAL: alert and no distress  Diabetic foot exam: normal DP and PT pulses, no trophic changes or ulcerative lesions, normal sensory exam, and normal monofilament exam    Results for orders placed or performed in visit on 03/17/25 (from the past 24 hours)   Hemoglobin A1c   Result Value Ref Range    Estimated Average Glucose 171 (H) <117 mg/dL    Hemoglobin A1C 7.6 (H) 0.0 - 5.6 %   CBC with platelets and differential    Narrative    The following orders were created for panel order CBC with platelets and differential.  Procedure                               Abnormality         Status                     ---------                               -----------         ------                     CBC with platelets and " ...[[id number]]                      Final result                 Please view results for these tests on the individual orders.   CBC with platelets and differential   Result Value Ref Range    WBC Count 7.9 4.0 - 11.0 10e3/uL    RBC Count 5.10 3.80 - 5.20 10e6/uL    Hemoglobin 14.7 11.7 - 15.7 g/dL    Hematocrit 44.9 35.0 - 47.0 %    MCV 88 78 - 100 fL    MCH 28.8 26.5 - 33.0 pg    MCHC 32.7 31.5 - 36.5 g/dL    RDW 12.2 10.0 - 15.0 %    Platelet Count 305 150 - 450 10e3/uL    % Neutrophils 57 %    % Lymphocytes 32 %    % Monocytes 9 %    % Eosinophils 2 %    % Basophils 0 %    % Immature Granulocytes 0 %    Absolute Neutrophils 4.5 1.6 - 8.3 10e3/uL    Absolute Lymphocytes 2.5 0.8 - 5.3 10e3/uL    Absolute Monocytes 0.7 0.0 - 1.3 10e3/uL    Absolute Eosinophils 0.2 0.0 - 0.7 10e3/uL    Absolute Basophils 0.0 0.0 - 0.2 10e3/uL    Absolute Immature Granulocytes 0.0 <=0.4 10e3/uL           Signed Electronically by: Mana Lynch MD

## 2025-03-18 LAB
ALBUMIN SERPL BCG-MCNC: 4.1 G/DL (ref 3.5–5.2)
ALP SERPL-CCNC: 82 U/L (ref 40–150)
ALT SERPL W P-5'-P-CCNC: 29 U/L (ref 0–50)
ANION GAP SERPL CALCULATED.3IONS-SCNC: 15 MMOL/L (ref 7–15)
AST SERPL W P-5'-P-CCNC: 25 U/L (ref 0–45)
BILIRUB SERPL-MCNC: 0.7 MG/DL
BUN SERPL-MCNC: 10 MG/DL (ref 8–23)
CALCIUM SERPL-MCNC: 9.5 MG/DL (ref 8.8–10.4)
CHLORIDE SERPL-SCNC: 103 MMOL/L (ref 98–107)
CHOLEST SERPL-MCNC: 168 MG/DL
CREAT SERPL-MCNC: 0.97 MG/DL (ref 0.51–0.95)
EGFRCR SERPLBLD CKD-EPI 2021: 63 ML/MIN/1.73M2
FASTING STATUS PATIENT QL REPORTED: YES
FASTING STATUS PATIENT QL REPORTED: YES
GLUCOSE SERPL-MCNC: 133 MG/DL (ref 70–99)
HCO3 SERPL-SCNC: 23 MMOL/L (ref 22–29)
HDLC SERPL-MCNC: 55 MG/DL
LDLC SERPL CALC-MCNC: 82 MG/DL
NONHDLC SERPL-MCNC: 113 MG/DL
POTASSIUM SERPL-SCNC: 4.4 MMOL/L (ref 3.4–5.3)
PROT SERPL-MCNC: 7.1 G/DL (ref 6.4–8.3)
SODIUM SERPL-SCNC: 141 MMOL/L (ref 135–145)
TRIGL SERPL-MCNC: 154 MG/DL
VIT B12 SERPL-MCNC: 710 PG/ML (ref 232–1245)

## 2025-03-18 NOTE — PROGRESS NOTES
[facility]  Department of Neurosurgery    Name: Kathy Posada  MRN: [id number]  : 1955     Reason for visit:  left petrous meningioma, followup visit    Dear Dr. Mcnally and Dr. Lynch,    It was a pleasure to see Ms. Posada in the [facility] today.  As you recall, Ms. Posada is a pleasant 69 year-old female who presented with abrupt unilateral hearing loss and aural fullness in the left ear, starting summer of 2022. An audiogram showed asymmetric hearing loss so an MRI was performed showing a left petrous meningioma for which she is referred. She was initially treated with steroids for her hearing loss but it did not recover fully.  She returns for scheduled surveillance of her meningioma.      Review of Systems:   Pertinent items are noted in HPI or as in patient entered ROS below, remainder of complete ROS is negative.     Medications:  atorvastatin, clobetasol, metformin, semaglutide     Allergies: Amoxicillin      Past Medical History: lichen sclerosus, hyperlipidemia, diabetes, appendectomy, cholecystectomy    Family History: CAD in parents     Social History: lifetime nonsmoker     Physical Exam:   General: No acute distress.   Head: No signs of trauma.    Eyes: Conjunctivae are normal.  Mouth/Throat: Oropharynx moist.  Neck: Normal range of motion.    Resp: No respiratory distress.   MSK: Moves all extremities.  No obvious deformity.  Neuro: The patient is fully oriented and quite pleasant. Speech normal. Extraocular movements are intact without nystagmus. Facial sensation is intact in V1, V2, V3 distributions. Facial nerve function is normal, rated as a House Brackmann 1, without synkinesis.  Palate is symmetric. Shoulders are full strength. Tongue is midline. There is no pronator drift. Full strength in all extremities. Sensation intact throughout.  Psych: Normal mood and affect. Behavior is normal.       Audiogram:  We reviewed the audiogram 3/25/2025 which shows:  Right PTA 20 dB,  % at 60 dB  Left PTA 66 dB, WRS 56 % at 100 dB    Imaging:  We reviewed the MRI from 3/25/2025 which shows a stable left cerebellopontine angle tumor with origin at the posterior petrous dura, measuring 10  x 8 mm most consistent with a meningioma.     Assessment:  Left petrous meningioma  2.  AAO HNS hearing class C on the left    Plan:  We reviewed the patient's history, imaging, natural history and expected outcomes of conservative management and intervention. Options include observation, radiosurgery, and surgical resection and we reviewed the risks and benefits of each approach. Given the small size we recommended observation.  We will plan a repeat MRI and audiogram in 1 year  She is using a hearing aid and knows that she may benefit from a CROS system should the hearing loss progress  I encouraged Ms. Posada to contact with any questions or concerns or if we may be of assistance in any way.      It was a pleasure to participate in the care of your patient. Please feel free to contact me at any point if I can be of any assistance for Ms. Posada.    Sincerely,  Gallo Rothman MD     20 minutes spent on the date of the encounter doing chart review, review of outside records, review of test results, interpretation of tests, patient visit, documentation and discussion with other provider(s)

## 2025-03-18 NOTE — PROGRESS NOTES
"  [facility]      Name: Kathy Posada  MRN: [id number]  Age: 69 year old  : 2025      Chief Complaint:   Follow up left CPA meningioma    History of Present Illness:   Kathy Posada is a 69 year old female with a history of type II diabetes, hyperlipidemia, and asymmetrical sensorineural hearing loss, who was seen in the [facility] for follow up of  left CPA meningioma. She is seen in conjunction with my colleague in neurosurgery, Dr. Gallo Rothman. She initially noticed left sided hearing loss after COVID in 2022. The hearing decreased bilaterally when he was sick but then the left side never improved. Symptoms were initially suspected to be secondary to Meniere's disease as she presented with a low frequency hearing hearing loss and tinnitus. Over time, she progressed to having more hearing loss. She did not have any improvement with oral or intratympanic steroids. An MRI was obtained demonstrating a left CPA lesion most consistent with meningioma and she was referred for further evaluation. She was previously seen on 2024, reporting that she was happy with her left hearing aid. She had elected for continued observation.     She has been doing well since her last visit. She is using a hearing aid which is helping a lot with the tinnitus and a little with the hearing. She has not had any issues with dizziness or falls.       Review of Systems:   Pertinent items are noted in HPI or as in patient entered ROS below, remainder of complete ROS is negative.       3/9/2024    10:35 AM    ENT ROS   Ears, Nose, Throat Hearing loss         Physical Exam:   /78   Pulse 95   Temp 98  F (36.7  C)   Ht 1.626 m (5' 4\")   Wt 76.7 kg (169 lb)   LMP  (LMP Unknown)   SpO2 95%   BMI 29.01 kg/m       Constitutional:  The patient was accompanied with her , well-groomed, and in no acute distress.     Skin: Normal:  " warm and pink without rash   Neurologic: Alert and oriented x 3.  CN's III-XII within normal limits.  Voice normal.    Psychiatric: The patient's affect was calm, cooperative, and appropriate.     Communication:  Normal; communicates verbally, normal voice quality.   Respiratory: Breathing comfortably without stridor or exertion of accessory muscles.    Head/Face:  No lesions or scars. No sinus tenderness.     Eyes: Pupils were equal and reactive.  Extraocular movement intact.     Ears: Pinnae and tragus non-tender.       Audiogram:  AUDIOGRAM 03/25/2025:  Results: Normal hearing right ear. Moderate to moderately-severe SNHL left ear. Thresholds stable re: audio 1/29/24. Non-significant decrease in word recognition (76% to 56% today). Normal tymp left, restricted right. Present right reflexes, absent left.       Right: Speech reception threshold is 20 dB with 100% word recognition. Tympanogram As type   Left: Speech reception threshold is 55 dB with 56% word recognition. Tympanogram A type     Audiogram was independently reviewed    Imaging:    MR BRAIN W/WO CONTRAST 03/25/2025  Provided History: Meningioma.  Comparison: 2/20/2024.     Findings:   No significant change in size of the extra-axial enhancing lesion overlying the left cerebellopontine angle, 1 x 1 x 0.8 cm.     No abnormal signal or enhancement along the course of the seventh and eighth cranial nerves on either side. Vestibular and auditory structures exhibit normal signal on T2-weighted images and no abnormal enhancement.     Images of the whole brain demonstrate no mass lesion, no mass effect, or midline shift. No intracranial hemorrhage on  susceptibility-weighted images. There is no restricted diffusion. Ventricles are proportionate to the cerebral sulci. No abnormal enhancement.                                                                    Impression: No significant change in size of the extra-axial enhancing lesion overlying the left  cerebellopontine angle, 1 x 1 x 0.8 cm.     MRI images were independently reviewed. We noted that the tumor appears to extend inferiorly into the cochlear aqueduct which likely explains the associated hearing loss.     Assessment and Plan:  Kathy Posada is a 69 year old female who was seen in the [facility] for follow up of a left CPA meningioma. We reviewed the pros and cons of each treatment approach, including the range of outcomes, benefits, side effects, and complications.     We reviewed the diagnosis and typical natural history of meningioma, specifically in context of having a lesion that has already caused hearing loss. We reviewed the involvement of the meninges as the nerve origin for the tumor. We discussed that the tumor is benign in nature, that it is the most common tumor found in the intracranial space, and is typically slow growing, at a rate of about 1 mm per year.      Treatment options and all risks and benefits of  each were discussed, including observation, radiosurgery, and surgical resection. The patient has all options available.      It is a reasonable option to obtain another scan in one year and continue watchful waiting.  We would get a scan before then if she developed worsening neurologic symptoms. Her tumor has been stable and we will continue with this approach for now.      We briefly reviewed radiosurgery and microsurgical excision as potential options if the tumor begins to grow.      She may continue using a hearing aid in the left ear if she finds benefit or may consider a CROS hearing aid. She has heard of this option and will keep it in mind.      The patient asked insightful questions and indicated that she would like to pursue observation at this time and will return to clinic in 1 year with new audio and MRI. The patient understands that they should return to clinic promptly if experiencing worsening or new symptoms including dizziness,  facial numbness, headache, or changes in vision.  The patient expressed understanding and is in agreement with this plan.  All questions were answered.     Follow Up: 1 year with audiogram and imaging.      Erika Deal MD  Fellow Physician  Otology & Neurotology  [facility]            Scribe Disclosure:   I, Vanessa Richardson, am serving as a scribe; to document services personally performed by Suzanne Griffith MD -based on data collection and the provider's statements to me.     Provider Disclosure:  I agree with above History, Review of Systems, Physical exam and Plan.  I have reviewed the content of the documentation and have edited it as needed. I have personally performed the services documented here and the documentation accurately represents those services and the decisions I have made.      Electronically signed by:  ***  Suzanne Griffith MD  Otology & Neurotology  [facility]

## 2025-03-25 ENCOUNTER — OFFICE VISIT (OUTPATIENT)
Dept: OTOLARYNGOLOGY | Facility: CLINIC | Age: 70
End: 2025-03-25
Payer: COMMERCIAL

## 2025-03-25 ENCOUNTER — OFFICE VISIT (OUTPATIENT)
Dept: AUDIOLOGY | Facility: CLINIC | Age: 70
End: 2025-03-25
Payer: COMMERCIAL

## 2025-03-25 ENCOUNTER — ANCILLARY PROCEDURE (OUTPATIENT)
Dept: MRI IMAGING | Facility: CLINIC | Age: 70
End: 2025-03-25
Attending: OTOLARYNGOLOGY
Payer: COMMERCIAL

## 2025-03-25 VITALS
HEART RATE: 95 BPM | WEIGHT: 169 LBS | DIASTOLIC BLOOD PRESSURE: 78 MMHG | TEMPERATURE: 98 F | SYSTOLIC BLOOD PRESSURE: 131 MMHG | BODY MASS INDEX: 28.85 KG/M2 | HEIGHT: 64 IN | OXYGEN SATURATION: 95 %

## 2025-03-25 DIAGNOSIS — H90.3 ASYMMETRICAL SENSORINEURAL HEARING LOSS: ICD-10-CM

## 2025-03-25 DIAGNOSIS — D32.9 MENINGIOMA (H): ICD-10-CM

## 2025-03-25 DIAGNOSIS — D32.9 MENINGIOMA (H): Primary | ICD-10-CM

## 2025-03-25 DIAGNOSIS — H90.5 SENSORINEURAL HEARING LOSS OF LEFT EAR: Primary | ICD-10-CM

## 2025-03-25 PROCEDURE — 3078F DIAST BP <80 MM HG: CPT | Performed by: OTOLARYNGOLOGY

## 2025-03-25 PROCEDURE — 3075F SYST BP GE 130 - 139MM HG: CPT | Performed by: OTOLARYNGOLOGY

## 2025-03-25 PROCEDURE — 1126F AMNT PAIN NOTED NONE PRSNT: CPT | Performed by: OTOLARYNGOLOGY

## 2025-03-25 PROCEDURE — A9585 GADOBUTROL INJECTION: HCPCS | Mod: JZ | Performed by: RADIOLOGY

## 2025-03-25 PROCEDURE — 70553 MRI BRAIN STEM W/O & W/DYE: CPT | Performed by: RADIOLOGY

## 2025-03-25 PROCEDURE — 99214 OFFICE O/P EST MOD 30 MIN: CPT | Performed by: OTOLARYNGOLOGY

## 2025-03-25 RX ORDER — GADOBUTROL 604.72 MG/ML
7.5 INJECTION INTRAVENOUS ONCE
Status: COMPLETED | OUTPATIENT
Start: 2025-03-25 | End: 2025-03-25

## 2025-03-25 RX ADMIN — GADOBUTROL 7.5 ML: 604.72 INJECTION INTRAVENOUS at 11:32

## 2025-03-25 ASSESSMENT — PAIN SCALES - GENERAL: PAINLEVEL_OUTOF10: NO PAIN (0)

## 2025-03-25 NOTE — PROGRESS NOTES
She has been doing well since her last visit. She is using a hearing aid which is helping a lot with the tinnitus and a little with the hearing. She has not had any issues with dizziness or falls.       Kathy Posada is a 69 year old female who was seen in the [facility] for follow up of a left CPA meningioma. We reviewed the pros and cons of each treatment approach, including the range of outcomes, benefits, side effects, and complications.     We reviewed the diagnosis and typical natural history of meningioma, specifically in context of having a lesion that has already caused hearing loss. We reviewed the involvement of the meninges as the nerve origin for the tumor. We discussed that the tumor is benign in nature, that it is the most common tumor found in the intracranial space, and is typically slow growing, at a rate of about 1 mm per year.      Treatment options and all risks and benefits of  each were discussed, including observation, radiosurgery, and surgical resection. The patient has all options available.      It is a reasonable option to obtain another scan in one year and continue watchful waiting.  We would get a scan before then if she developed worsening neurologic symptoms. Her tumor has been stable and we will continue with this approach for now.     We briefly reviewed radiosurgery and microsurgical excision as potential options if the tumor begins to grow.     She may continue using a hearing aid in the left ear if she finds benefit or may consider a CROS hearing aid. She has heard of this option and will keep it in mind.     The patient asked insightful questions and indicated that she would like to pursue observation at this time and will return to clinic in 6 months with new audio and MRI. The patient understands that they should return to clinic promptly if experiencing worsening or new symptoms including dizziness, facial numbness, headache, or changes in  vision.  The patient expressed understanding and is in agreement with this plan.  All questions were answered.     Follow Up: 6 months with audiogram and imaging.      Erika Deal MD  Fellow Physician  Otology & Neurotology  [facility]

## 2025-03-25 NOTE — LETTER
3/25/2025       RE: Kathy Posada  [address] [place] [address]     Dear Colleague,    Thank you for referring your patient, Kathy Posada, to the [facility]. Please see a copy of my visit note below.    [facility]  Department of Neurosurgery    Name: Kathy Posada  MRN: [id number]  : 1955     Reason for visit:  left petrous meningioma, followup visit    Dear Dr. Mcnally and Dr. Lynch,    It was a pleasure to see Ms. Posada in the [facility] today.  As you recall, Ms. Posada is a pleasant 69 year-old female who presented with abrupt unilateral hearing loss and aural fullness in the left ear, starting summer of 2022. An audiogram showed asymmetric hearing loss so an MRI was performed showing a left petrous meningioma for which she is referred. She was initially treated with steroids for her hearing loss but it did not recover fully.  She returns for scheduled surveillance of her meningioma.      Review of Systems:   Pertinent items are noted in HPI or as in patient entered ROS below, remainder of complete ROS is negative.     Medications:  atorvastatin, clobetasol, metformin, semaglutide     Allergies: Amoxicillin      Past Medical History: lichen sclerosus, hyperlipidemia, diabetes, appendectomy, cholecystectomy    Family History: CAD in parents     Social History: lifetime nonsmoker     Physical Exam:   General: No acute distress.   Head: No signs of trauma.    Eyes: Conjunctivae are normal.  Mouth/Throat: Oropharynx moist.  Neck: Normal range of motion.    Resp: No respiratory distress.   MSK: Moves all extremities.  No obvious deformity.  Neuro: The patient is fully oriented and quite pleasant. Speech normal. Extraocular movements are intact without nystagmus. Facial sensation is intact in V1, V2, V3  distributions. Facial nerve function is normal, rated as a House Brackmann 1, without synkinesis.  Palate is symmetric. Shoulders are full strength. Tongue is midline. There is no pronator drift. Full strength in all extremities. Sensation intact throughout.  Psych: Normal mood and affect. Behavior is normal.      Audiogram:  We reviewed the audiogram 3/25/2025 which shows:  Right PTA 20 dB,  % at 60 dB  Left PTA 66 dB, WRS 56 % at 100 dB    Imaging:  We reviewed the MRI from 3/25/2025 which shows a stable left cerebellopontine angle tumor with origin at the posterior petrous dura, measuring 10  x 8 mm most consistent with a meningioma.     Assessment:  Left petrous meningioma  2.  AAO HNS hearing class C on the left    Plan:  We reviewed the patient's history, imaging, natural history and expected outcomes of conservative management and intervention. Options include observation, radiosurgery, and surgical resection and we reviewed the risks and benefits of each approach. Given the small size we recommended observation.  We will plan a repeat MRI and audiogram in 1 year  She is using a hearing aid and knows that she may benefit from a CROS system should the hearing loss progress  I encouraged Ms. Posada to contact with any questions or concerns or if we may be of assistance in any way.      It was a pleasure to participate in the care of your patient. Please feel free to contact me at any point if I can be of any assistance for Ms. Posada.    Sincerely,  Gallo Rothman MD     20 minutes spent on the date of the encounter doing chart review, review of outside records, review of test results, interpretation of tests, patient visit, documentation and discussion with other provider(s)        Again, thank you for allowing me to participate in the care of your patient.      Sincerely,    Gallo Rothman MD

## 2025-03-25 NOTE — LETTER
[facility]  [facility] NOSE AND THROAT CLINIC [address]  4TH FLOOR  [facility] [address]  Phone: [phone]  Fax: [phone]          3/25/2025    RE:   Kathy Posada  [address] [place] [address]      Dear Colleague,    Thank you for referring your patient, Kathy Posada, to the [facility]. Please see a copy of my visit note below.      [facility]  Department of Neurosurgery    Name: Kathy Posada  MRN: [id number]  : 1955     Reason for visit:  left petrous meningioma, followup visit    Dear Dr. Mcnally and Dr. Lynch,    It was a pleasure to see Ms. Posada in the [facility] today.  As you recall, Ms. Posada is a pleasant 69 year-old female who presented with abrupt unilateral hearing loss and aural fullness in the left ear, starting summer of 2022. An audiogram showed asymmetric hearing loss so an MRI was performed showing a left petrous meningioma for which she is referred. She was initially treated with steroids for her hearing loss but it did not recover fully.  She returns for scheduled surveillance of her meningioma.      Review of Systems:   Pertinent items are noted in HPI or as in patient entered ROS below, remainder of complete ROS is negative.     Medications:  atorvastatin, clobetasol, metformin, semaglutide     Allergies: Amoxicillin      Past Medical History: lichen sclerosus, hyperlipidemia, diabetes, appendectomy, cholecystectomy    Family History: CAD in parents     Social History: lifetime nonsmoker     Physical Exam:   General: No acute distress.   Head: No signs of trauma.    Eyes: Conjunctivae are normal.  Mouth/Throat: Oropharynx moist.  Neck: Normal range of motion.    Resp: No respiratory distress.   MSK: Moves all extremities.  No obvious deformity.  Neuro: The patient  is fully oriented and quite pleasant. Speech normal. Extraocular movements are intact without nystagmus. Facial sensation is intact in V1, V2, V3 distributions. Facial nerve function is normal, rated as a House Brackmann 1, without synkinesis.  Palate is symmetric. Shoulders are full strength. Tongue is midline. There is no pronator drift. Full strength in all extremities. Sensation intact throughout.  Psych: Normal mood and affect. Behavior is normal.      Audiogram:  We reviewed the audiogram 3/25/2025 which shows:  Right PTA 20 dB,  % at 60 dB  Left PTA 66 dB, WRS 56 % at 100 dB    Imaging:  We reviewed the MRI from 3/25/2025 which shows a stable left cerebellopontine angle tumor with origin at the posterior petrous dura, measuring 10  x 8 mm most consistent with a meningioma.     Assessment:  Left petrous meningioma  2.  AAO HNS hearing class C on the left    Plan:  We reviewed the patient's history, imaging, natural history and expected outcomes of conservative management and intervention. Options include observation, radiosurgery, and surgical resection and we reviewed the risks and benefits of each approach. Given the small size we recommended observation.  We will plan a repeat MRI and audiogram in 1 year  She is using a hearing aid and knows that she may benefit from a CROS system should the hearing loss progress  I encouraged Ms. Posada to contact with any questions or concerns or if we may be of assistance in any way.      It was a pleasure to participate in the care of your patient. Please feel free to contact me at any point if I can be of any assistance for Ms. Posada.    Sincerely,  Gallo Rothman MD     20 minutes spent on the date of the encounter doing chart review, review of outside records, review of test results, interpretation of tests, patient visit, documentation and discussion with other provider(s)          Again, thank you for allowing me to participate in the care of your patient.       Sincerely,    Gallo Rothman MD

## 2025-03-25 NOTE — PROGRESS NOTES
AUDIOLOGY REPORT    SUMMARY: Audiology visit completed. See audiogram for results.      RECOMMENDATIONS: Follow-up with ENT.    Jose De Jesus Noland, [facility]  Licensed Audiologist  [place] License #[id number]

## 2025-03-25 NOTE — NURSING NOTE
"Chief Complaint   Patient presents with    RECHECK     Follow up with MRI and audio     Blood pressure 131/78, pulse 95, temperature 98  F (36.7  C), height 1.626 m (5' 4\"), weight 76.7 kg (169 lb), SpO2 95%.  Jose Angel Mesa LPN    "

## 2025-03-25 NOTE — PATIENT INSTRUCTIONS
You were seen today with Dr. Suzanne Griffith and Dr. Gallo Rothman. Your primary contact after today's visit is: TOR James    Next steps:  Please return to clinic in one year with updated MRI and audiogram.         How to Contact Us  Send a [employer] message to your provider.   Call the clinic - your call will be routed appropriately.   ENT Clinic: [phone]   Neurosurgery Clinic: [phone]  To speak directly to an RN Care Coordinator:  Erika RN: [phone]  Zaida RN: [phone]    Note: We do our best to check voicemail frequently throughout the day and make every effort to return calls within 1-2 business days. For urgent matters, please use the general clinic phone numbers listed above.

## 2025-03-25 NOTE — LETTER
3/25/2025       RE: Kathy Posada  [address] [place] [address]     Dear Colleague,    Thank you for referring your patient, Kathy Posada, to the [facility] EAR NOSE AND THROAT CLINIC [place] [facility]. Please see a copy of my visit note below.      [facility]      Name: Kathy Posada  MRN: [id number]  Age: 69 year old  : 2025      Chief Complaint:   Follow up left CPA meningioma    History of Present Illness:   Kathy Posada is a 69 year old female with a history of type II diabetes, hyperlipidemia, and asymmetrical sensorineural hearing loss, who was seen in the [facility] for follow up of  left CPA meningioma. She is seen in conjunction with my colleague in neurosurgery, Dr. Gallo Rothman. She initially noticed left sided hearing loss after COVID in 2022. The hearing decreased bilaterally when he was sick but then the left side never improved. Symptoms were initially suspected to be secondary to Meniere's disease as she presented with a low frequency hearing hearing loss and tinnitus. Over time, she progressed to having more hearing loss. She did not have any improvement with oral or intratympanic steroids. An MRI was obtained demonstrating a left CPA lesion most consistent with meningioma and she was referred for further evaluation. She was previously seen on 2024, reporting that she was happy with her left hearing aid. She had elected for continued observation.     She has been doing well since her last visit. She is using a hearing aid which is helping a lot with the tinnitus and a little with the hearing. She has not had any issues with dizziness or falls.       Review of Systems:   Pertinent items are noted in HPI or as in patient entered ROS below, remainder of complete ROS is negative.       3/9/2024    10:35 AM    ENT ROS   Ears,  "Nose, Throat Hearing loss         Physical Exam:   /78   Pulse 95   Temp 98  F (36.7  C)   Ht 1.626 m (5' 4\")   Wt 76.7 kg (169 lb)   LMP  (LMP Unknown)   SpO2 95%   BMI 29.01 kg/m       Constitutional:  The patient was accompanied with her , well-groomed, and in no acute distress.     Skin: Normal:  warm and pink without rash   Neurologic: Alert and oriented x 3.  CN's III-XII within normal limits.  Voice normal.    Psychiatric: The patient's affect was calm, cooperative, and appropriate.     Communication:  Normal; communicates verbally, normal voice quality.   Respiratory: Breathing comfortably without stridor or exertion of accessory muscles.    Head/Face:  No lesions or scars. No sinus tenderness.     Eyes: Pupils were equal and reactive.  Extraocular movement intact.     Ears: Pinnae and tragus non-tender.       Audiogram:  AUDIOGRAM 03/25/2025:  Results: Normal hearing right ear. Moderate to moderately-severe SNHL left ear. Thresholds stable re: audio 1/29/24. Non-significant decrease in word recognition (76% to 56% today). Normal tymp left, restricted right. Present right reflexes, absent left.       Right: Speech reception threshold is 20 dB with 100% word recognition. Tympanogram As type   Left: Speech reception threshold is 55 dB with 56% word recognition. Tympanogram A type     Audiogram was independently reviewed    Imaging:    MR BRAIN W/WO CONTRAST 03/25/2025  Provided History: Meningioma.  Comparison: 2/20/2024.     Findings:   No significant change in size of the extra-axial enhancing lesion overlying the left cerebellopontine angle, 1 x 1 x 0.8 cm.     No abnormal signal or enhancement along the course of the seventh and eighth cranial nerves on either side. Vestibular and auditory structures exhibit normal signal on T2-weighted images and no abnormal enhancement.     Images of the whole brain demonstrate no mass lesion, no mass effect, or midline shift. No intracranial " hemorrhage on  susceptibility-weighted images. There is no restricted diffusion. Ventricles are proportionate to the cerebral sulci. No abnormal enhancement.                                                                    Impression: No significant change in size of the extra-axial enhancing lesion overlying the left cerebellopontine angle, 1 x 1 x 0.8 cm.     MRI images were independently reviewed. We noted that the tumor appears to extend inferiorly into the cochlear aqueduct which likely explains the associated hearing loss.     Assessment and Plan:  Kathy Posada is a 69 year old female who was seen in the [facility] for follow up of a left CPA meningioma. We reviewed the pros and cons of each treatment approach, including the range of outcomes, benefits, side effects, and complications.     We reviewed the diagnosis and typical natural history of meningioma, specifically in context of having a lesion that has already caused hearing loss. We reviewed the involvement of the meninges as the nerve origin for the tumor. We discussed that the tumor is benign in nature, that it is the most common tumor found in the intracranial space, and is typically slow growing, at a rate of about 1 mm per year.      Treatment options and all risks and benefits of  each were discussed, including observation, radiosurgery, and surgical resection. The patient has all options available.      It is a reasonable option to obtain another scan in one year and continue watchful waiting.  We would get a scan before then if she developed worsening neurologic symptoms. Her tumor has been stable and we will continue with this approach for now.      We briefly reviewed radiosurgery and microsurgical excision as potential options if the tumor begins to grow.      She may continue using a hearing aid in the left ear if she finds benefit or may consider a CROS hearing aid. She has heard of this option and will  keep it in mind.      The patient asked insightful questions and indicated that she would like to pursue observation at this time and will return to clinic in 1 year with new audio and MRI. The patient understands that they should return to clinic promptly if experiencing worsening or new symptoms including dizziness, facial numbness, headache, or changes in vision.  The patient expressed understanding and is in agreement with this plan.  All questions were answered.     Follow Up: 1 year with audiogram and imaging.      Erika Deal MD  Fellow Physician  Otology & Neurotology  [facility]     I, Suzanne Griffith MD, saw this patient with the resident/fellow and agree with the resident's findings and plan of care as documented in the resident's/fellow's note.        Provid      Again, thank you for allowing me to participate in the care of your patient.      Sincerely,    Suzanne Griffith MD

## 2025-06-16 ENCOUNTER — OFFICE VISIT (OUTPATIENT)
Dept: FAMILY MEDICINE | Facility: CLINIC | Age: 70
End: 2025-06-16
Payer: COMMERCIAL

## 2025-06-16 VITALS
SYSTOLIC BLOOD PRESSURE: 128 MMHG | OXYGEN SATURATION: 100 % | WEIGHT: 169.2 LBS | BODY MASS INDEX: 28.89 KG/M2 | DIASTOLIC BLOOD PRESSURE: 68 MMHG | HEART RATE: 82 BPM | HEIGHT: 64 IN | RESPIRATION RATE: 16 BRPM

## 2025-06-16 DIAGNOSIS — D32.9 MENINGIOMA (H): ICD-10-CM

## 2025-06-16 DIAGNOSIS — Z12.31 VISIT FOR SCREENING MAMMOGRAM: ICD-10-CM

## 2025-06-16 DIAGNOSIS — E11.9 TYPE 2 DIABETES MELLITUS WITHOUT COMPLICATION, WITHOUT LONG-TERM CURRENT USE OF INSULIN (H): Primary | ICD-10-CM

## 2025-06-16 DIAGNOSIS — R03.0 WHITE COAT SYNDROME WITHOUT HYPERTENSION: ICD-10-CM

## 2025-06-16 DIAGNOSIS — E78.5 HYPERLIPIDEMIA, UNSPECIFIED HYPERLIPIDEMIA TYPE: ICD-10-CM

## 2025-06-16 LAB
EST. AVERAGE GLUCOSE BLD GHB EST-MCNC: 171 MG/DL
HBA1C MFR BLD: 7.6 % (ref 0–5.6)
HOLD SPECIMEN: NORMAL

## 2025-06-16 PROCEDURE — 36415 COLL VENOUS BLD VENIPUNCTURE: CPT | Performed by: FAMILY MEDICINE

## 2025-06-16 PROCEDURE — 83036 HEMOGLOBIN GLYCOSYLATED A1C: CPT | Performed by: FAMILY MEDICINE

## 2025-06-16 NOTE — PROGRESS NOTES
"  Assessment & Plan     ICD-10-CM    1. Type 2 diabetes mellitus without complication, without long-term current use of insulin (H)  E11.9 HEMOGLOBIN A1C     HEMOGLOBIN A1C      2. Visit for screening mammogram  Z12.31 MA Screening Bilateral w/ Dominic      3. Hyperlipidemia, unspecified hyperlipidemia type  E78.5       4. White coat syndrome without hypertension  R03.0       5. Meningioma (H)  D32.9         Patient presents today for follow-up of type 2 diabetes.  She has started exercise with swimming again.  Winter months are usually difficult for her.  She does not have any acute concerns.  Following issues addressed  1: Type 2 diabetes: A1c remains under fair control.  Continue current regimen with metformin and Rybelsus  2: Hyperlipidemia.  Continue statin  3: Hypertension whitecoat syndrome: Repeat check shows normotensive blood pressures.  Currently not on any medication  4: Meningioma: Has seen the specialist.  MRI shows stability.  Follow-up in 1 year is recommended.  Reviewed specialist visits    The longitudinal plan of care for the diagnosis(es)/condition(s) as documented were addressed during this visit. Due to the added complexity in care, I will continue to support Kathy in the subsequent management and with ongoing continuity of care.      BMI  Estimated body mass index is 29.04 kg/m  as calculated from the following:    Height as of this encounter: 1.626 m (5' 4\").    Weight as of this encounter: 76.7 kg (169 lb 3.2 oz).         Follow-up    Follow-up Visit   Expected date:  Sep 16, 2025 (Approximate)      Follow Up Appointment Details:     Follow-up with whom?: Me    Follow-Up for what?: Medicare Wellness    Welcome or Annual?: Annual Wellness    How?: In Person                 Subjective   Kathy is a 69 year old, presenting for the following health issues:  Diabetes (Pt is fasting today, no concerns.)        6/16/2025    10:10 AM   Additional Questions   Roomed by Yelena Arreola CMA     History of " Present Illness       Diabetes:   She presents for follow up of diabetes.  She is checking home blood glucose one time daily.   She checks blood glucose before meals.  Blood glucose is never over 200 and never under 70.  When her blood glucose is low, the patient is asymptomatic for confusion, blurred vision, lethargy and reports not feeling dizzy, shaky, or weak.   She has no concerns regarding her diabetes at this time.   She is not experiencing numbness or burning in feet, excessive thirst, blurry vision, weight changes or redness, sores or blisters on feet. The patient has had a diabetic eye exam in the last 12 months. Eye exam performed on June 1 2024. Location of last eye exam [place] [facility].        She eats 4 or more servings of fruits and vegetables daily.She consumes 0 sweetened beverage(s) daily.She exercises with enough effort to increase her heart rate 10 to 19 minutes per day.  She exercises with enough effort to increase her heart rate 3 or less days per week. She is missing 1 dose(s) of medications per week.  She is not taking prescribed medications regularly due to remembering to take.        Patient Active Problem List   Diagnosis    Hyperlipidemia    Diabetes type 2, controlled (H)    Lichen sclerosus of female genitalia    Controlled type 2 diabetes mellitus with hyperglycemia, without long-term current use of insulin (H)    Type 2 diabetes mellitus without complication, without long-term current use of insulin (H)    White coat syndrome without hypertension    Meningioma (H)     Current Outpatient Medications   Medication Sig Dispense Refill    atorvastatin (LIPITOR) 40 MG tablet Take 1 tablet (40 mg) by mouth at bedtime 90 tablet 2    blood glucose (NO BRAND SPECIFIED) test strip 1 strip by In Vitro route daily Use to test blood sugar 1-2 times daily or as directed. Contour test strip 100 strip 3    clobetasol (TEMOVATE) 0.05 % external ointment [CLOBETASOL (TEMOVATE) 0.05 % OINTMENT] APPLY  "TO AFFECTED SKIN AS INSTRUCTED Strength: 0.05 % 60 g 1    metFORMIN (GLUCOPHAGE) 500 MG tablet Take 2 tablets (1,000 mg) by mouth 2 times daily (with meals). 360 tablet 1    Semaglutide (RYBELSUS) 7 MG tablet Take 1 tablet (7 mg) by mouth daily. 90 tablet 3     No current facility-administered medications for this visit.           Review of Systems  Constitutional, neuro, ENT, endocrine, pulmonary, cardiac, gastrointestinal, genitourinary, musculoskeletal, integument and psychiatric systems are negative, except as otherwise noted.      Objective    BP (!) 155/83 (BP Location: Left arm, Patient Position: Sitting, Cuff Size: Adult Large)   Pulse 89   Resp 16   Ht 1.626 m (5' 4\")   Wt 76.7 kg (169 lb 3.2 oz)   LMP  (LMP Unknown)   SpO2 100%   BMI 29.04 kg/m    Body mass index is 29.04 kg/m .  Physical Exam   GENERAL: alert and no distress    Results for orders placed or performed in visit on 06/16/25 (from the past 24 hours)   HEMOGLOBIN A1C   Result Value Ref Range    Estimated Average Glucose 171 (H) <117 mg/dL    Hemoglobin A1C 7.6 (H) 0.0 - 5.6 %   Extra Tube    Narrative    The following orders were created for panel order Extra Tube.  Procedure                               Abnormality         Status                     ---------                               -----------         ------                     Extra Green Top (Lithiu...[[id number]]                      In process                   Please view results for these tests on the individual orders.           Signed Electronically by: Mana Lynch MD    "

## 2025-06-17 ENCOUNTER — PATIENT OUTREACH (OUTPATIENT)
Dept: CARE COORDINATION | Facility: CLINIC | Age: 70
End: 2025-06-17
Payer: COMMERCIAL

## 2025-06-17 ENCOUNTER — TRANSFERRED RECORDS (OUTPATIENT)
Dept: HEALTH INFORMATION MANAGEMENT | Facility: CLINIC | Age: 70
End: 2025-06-17
Payer: COMMERCIAL

## 2025-06-17 LAB — RETINOPATHY: NEGATIVE

## 2025-06-23 DIAGNOSIS — E78.5 HYPERLIPIDEMIA, UNSPECIFIED HYPERLIPIDEMIA TYPE: ICD-10-CM

## 2025-06-24 RX ORDER — ATORVASTATIN CALCIUM 40 MG/1
40 TABLET, FILM COATED ORAL AT BEDTIME
Qty: 90 TABLET | Refills: 2 | Status: SHIPPED | OUTPATIENT
Start: 2025-06-24